# Patient Record
Sex: FEMALE | Race: BLACK OR AFRICAN AMERICAN | Employment: OTHER | URBAN - METROPOLITAN AREA
[De-identification: names, ages, dates, MRNs, and addresses within clinical notes are randomized per-mention and may not be internally consistent; named-entity substitution may affect disease eponyms.]

---

## 2017-06-05 ENCOUNTER — OP HISTORICAL/CONVERTED ENCOUNTER (OUTPATIENT)
Dept: OTHER | Age: 72
End: 2017-06-05

## 2017-12-22 ENCOUNTER — HOSPITAL ENCOUNTER (OUTPATIENT)
Dept: MRI IMAGING | Age: 72
Discharge: HOME | End: 2017-12-22
Attending: PHYSICAL MEDICINE & REHABILITATION
Payer: MEDICAID

## 2017-12-22 DIAGNOSIS — M54.50 LOW BACK PAIN: ICD-10-CM

## 2017-12-22 PROCEDURE — 72148 MRI LUMBAR SPINE W/O DYE: CPT

## 2018-01-26 ENCOUNTER — OP HISTORICAL/CONVERTED ENCOUNTER (OUTPATIENT)
Dept: OTHER | Age: 73
End: 2018-01-26

## 2018-05-31 ENCOUNTER — OP HISTORICAL/CONVERTED ENCOUNTER (OUTPATIENT)
Dept: OTHER | Age: 73
End: 2018-05-31

## 2018-07-05 ENCOUNTER — OP HISTORICAL/CONVERTED ENCOUNTER (OUTPATIENT)
Dept: OTHER | Age: 73
End: 2018-07-05

## 2018-09-27 ENCOUNTER — ED HISTORICAL/CONVERTED ENCOUNTER (OUTPATIENT)
Dept: OTHER | Age: 73
End: 2018-09-27

## 2019-01-28 ENCOUNTER — OP HISTORICAL/CONVERTED ENCOUNTER (OUTPATIENT)
Dept: OTHER | Age: 74
End: 2019-01-28

## 2019-02-18 ENCOUNTER — ED HISTORICAL/CONVERTED ENCOUNTER (OUTPATIENT)
Dept: OTHER | Age: 74
End: 2019-02-18

## 2019-03-04 ENCOUNTER — ED HISTORICAL/CONVERTED ENCOUNTER (OUTPATIENT)
Dept: OTHER | Age: 74
End: 2019-03-04

## 2019-09-16 ENCOUNTER — OP HISTORICAL/CONVERTED ENCOUNTER (OUTPATIENT)
Dept: OTHER | Age: 74
End: 2019-09-16

## 2020-02-18 ENCOUNTER — OP HISTORICAL/CONVERTED ENCOUNTER (OUTPATIENT)
Dept: OTHER | Age: 75
End: 2020-02-18

## 2020-07-22 ENCOUNTER — OP HISTORICAL/CONVERTED ENCOUNTER (OUTPATIENT)
Dept: OTHER | Age: 75
End: 2020-07-22

## 2020-08-05 RX ORDER — ALBUTEROL SULFATE 90 UG/1
2 AEROSOL, METERED RESPIRATORY (INHALATION)
Qty: 1 INHALER | Refills: 3 | Status: SHIPPED | OUTPATIENT
Start: 2020-08-05 | End: 2020-10-28 | Stop reason: SDUPTHER

## 2020-08-20 ENCOUNTER — ED HISTORICAL/CONVERTED ENCOUNTER (OUTPATIENT)
Dept: OTHER | Age: 75
End: 2020-08-20

## 2020-08-21 PROBLEM — Z86.59 HISTORY OF MOOD DISORDER: Status: ACTIVE | Noted: 2020-08-21

## 2020-08-21 PROBLEM — R10.11 RUQ ABDOMINAL PAIN: Status: ACTIVE | Noted: 2020-08-21

## 2020-08-21 PROBLEM — E55.9 VITAMIN D DEFICIENCY: Status: ACTIVE | Noted: 2020-08-21

## 2020-08-21 PROBLEM — K73.9 CHRONIC HEPATITIS (HCC): Status: ACTIVE | Noted: 2020-08-21

## 2020-08-21 PROBLEM — M25.569 PAIN, KNEE: Status: ACTIVE | Noted: 2020-08-21

## 2020-08-21 PROBLEM — F32.A DEPRESSIVE DISORDER: Status: ACTIVE | Noted: 2020-08-21

## 2020-08-21 PROBLEM — J45.909 ASTHMA: Status: ACTIVE | Noted: 2020-08-21

## 2020-08-21 PROBLEM — R60.9 EDEMA: Status: ACTIVE | Noted: 2020-08-21

## 2020-08-21 PROBLEM — N18.2 CHRONIC KIDNEY DISEASE (CKD), STAGE II (MILD): Status: ACTIVE | Noted: 2020-08-21

## 2020-08-21 PROBLEM — M19.90 OSTEOARTHRITIS: Status: ACTIVE | Noted: 2020-08-21

## 2020-08-21 PROBLEM — E78.00 PURE HYPERCHOLESTEROLEMIA WITH TARGET LOW DENSITY LIPOPROTEIN (LDL) CHOLESTEROL LESS THAN 130 MG/DL: Status: ACTIVE | Noted: 2020-08-21

## 2020-08-21 PROBLEM — E66.01 MORBID OBESITY (HCC): Status: ACTIVE | Noted: 2020-08-21

## 2020-08-21 PROBLEM — G47.30 SLEEP APNEA: Status: ACTIVE | Noted: 2020-08-21

## 2020-08-21 PROBLEM — K74.60 CIRRHOSIS OF LIVER (HCC): Status: ACTIVE | Noted: 2020-08-21

## 2020-08-21 PROBLEM — F31.9 BIPOLAR 1 DISORDER (HCC): Status: ACTIVE | Noted: 2020-08-21

## 2020-08-21 PROBLEM — D64.9 ANEMIA: Status: ACTIVE | Noted: 2020-08-21

## 2020-08-21 PROBLEM — J44.9 COPD (CHRONIC OBSTRUCTIVE PULMONARY DISEASE) (HCC): Status: ACTIVE | Noted: 2020-08-21

## 2020-08-21 PROBLEM — M54.16 LUMBAR RADICULOPATHY: Status: ACTIVE | Noted: 2020-08-21

## 2020-08-24 ENCOUNTER — OFFICE VISIT (OUTPATIENT)
Dept: INTERNAL MEDICINE CLINIC | Age: 75
End: 2020-08-24
Payer: MEDICAID

## 2020-08-24 VITALS
HEART RATE: 72 BPM | HEIGHT: 62 IN | DIASTOLIC BLOOD PRESSURE: 64 MMHG | BODY MASS INDEX: 49.76 KG/M2 | RESPIRATION RATE: 18 BRPM | OXYGEN SATURATION: 96 % | WEIGHT: 270.4 LBS | SYSTOLIC BLOOD PRESSURE: 120 MMHG | TEMPERATURE: 98.2 F

## 2020-08-24 DIAGNOSIS — E66.01 MORBID OBESITY (HCC): ICD-10-CM

## 2020-08-24 DIAGNOSIS — F32.A DEPRESSIVE DISORDER: ICD-10-CM

## 2020-08-24 DIAGNOSIS — G47.30 SLEEP APNEA, UNSPECIFIED TYPE: ICD-10-CM

## 2020-08-24 DIAGNOSIS — K74.69 OTHER CIRRHOSIS OF LIVER (HCC): ICD-10-CM

## 2020-08-24 DIAGNOSIS — J44.9 CHRONIC OBSTRUCTIVE PULMONARY DISEASE, UNSPECIFIED COPD TYPE (HCC): ICD-10-CM

## 2020-08-24 DIAGNOSIS — I10 ESSENTIAL HYPERTENSION: Primary | ICD-10-CM

## 2020-08-24 DIAGNOSIS — M54.16 LUMBAR RADICULOPATHY: ICD-10-CM

## 2020-08-24 PROCEDURE — 99214 OFFICE O/P EST MOD 30 MIN: CPT | Performed by: INTERNAL MEDICINE

## 2020-08-24 RX ORDER — MELATONIN
1 DAILY
COMMUNITY

## 2020-08-24 RX ORDER — LANOLIN ALCOHOL/MO/W.PET/CERES
1 CREAM (GRAM) TOPICAL DAILY
COMMUNITY

## 2020-08-24 RX ORDER — DICLOFENAC SODIUM 10 MG/G
2 GEL TOPICAL 2 TIMES DAILY
COMMUNITY
Start: 2020-06-22 | End: 2022-05-24 | Stop reason: SDUPTHER

## 2020-08-24 RX ORDER — MULTIVITAMIN
1 TABLET ORAL DAILY
COMMUNITY

## 2020-08-24 RX ORDER — TRAZODONE HYDROCHLORIDE 50 MG/1
1 TABLET ORAL DAILY
COMMUNITY
Start: 2020-06-13 | End: 2020-10-28 | Stop reason: ALTCHOICE

## 2020-08-24 RX ORDER — HYDROCHLOROTHIAZIDE 25 MG/1
25 TABLET ORAL
Qty: 30 TAB | Refills: 3 | Status: SHIPPED | OUTPATIENT
Start: 2020-08-24 | End: 2020-10-28 | Stop reason: SDUPTHER

## 2020-08-24 RX ORDER — HYDROCHLOROTHIAZIDE 25 MG/1
1 TABLET ORAL DAILY
COMMUNITY
End: 2020-08-24 | Stop reason: SDUPTHER

## 2020-08-24 RX ORDER — LATANOPROST 50 UG/ML
1 SOLUTION/ DROPS OPHTHALMIC DAILY
COMMUNITY
Start: 2020-06-13

## 2020-08-24 RX ORDER — DULOXETIN HYDROCHLORIDE 20 MG/1
20 CAPSULE, DELAYED RELEASE ORAL 2 TIMES DAILY
Qty: 60 CAP | Refills: 3 | Status: SHIPPED | OUTPATIENT
Start: 2020-08-24 | End: 2021-01-27 | Stop reason: DRUGHIGH

## 2020-08-24 RX ORDER — SPIRONOLACTONE 25 MG/1
1 TABLET ORAL DAILY
COMMUNITY
Start: 2020-06-22 | End: 2020-08-24 | Stop reason: SDUPTHER

## 2020-08-24 RX ORDER — SPIRONOLACTONE 25 MG/1
25 TABLET ORAL DAILY
Qty: 30 TAB | Refills: 3 | Status: SHIPPED | OUTPATIENT
Start: 2020-08-24 | End: 2020-10-28 | Stop reason: SDUPTHER

## 2020-08-24 NOTE — PROGRESS NOTES
Jarrell Good is a 69 y.o. female and presents with ED Follow-up    Recently she visited emergency room on 20th August for headache and she had uncontrolled hypertension she has history of hepatitis C and liver cirrhosis and according to her she has done her ultrasound of the liver as a protocol, by hepatology clinic [place] by [facility] and I do not have any records from [address], login I will try to get information from the computer  . She told me she has no depression she feels very good and she needs refills her blood pressure today is wonderfully controlled and no headache no dizziness no nausea vomiting.,  She told me that she could not take trazodone or tramadol which was making her sleepy,. She has no negative thoughts but she had history of depression  and musculoskeletal joint pain and it is controlled by taking low-dose of duloxetine twice a day because  she was not tolerating high dose. Review of Systems    Review of Systems   Constitutional: Negative. HENT: Negative for hearing loss. Eyes: Negative for blurred vision. Respiratory: Negative for cough, sputum production, shortness of breath and wheezing. Cardiovascular: Negative. Gastrointestinal: Negative. Genitourinary: Negative. Musculoskeletal: Negative. Neurological: Negative for dizziness and headaches. Psychiatric/Behavioral: Negative for depression. The patient is not nervous/anxious.          Past Medical History:   Diagnosis Date    Anemia     Asthma 8/21/2020    Bipolar 1 disorder ([address] 8/21/2020    Chronic hepatitis ([address] 8/21/2020    Chronic kidney disease (CKD), stage II (mild) 8/21/2020    Cirrhosis of liver ([address] 8/21/2020    COPD (chronic obstructive pulmonary disease) ([address] 8/21/2020    Depressive disorder 8/21/2020    Edema 8/21/2020    History of mood disorder 8/21/2020    Hypertension     Lumbar radiculopathy 8/21/2020    Morbid obesity ([address] 8/21/2020    Osteoarthritis 8/21/2020    Pain, knee 8/21/2020    Pure hypercholesterolemia with target low density lipoprotein (LDL) cholesterol less than 130 mg/dL 8/21/2020    RUQ abdominal pain 8/21/2020    Sleep apnea 8/21/2020    Vitamin D deficiency 8/21/2020     Past Surgical History:   Procedure Laterality Date    HX COLONOSCOPY      HX COLONOSCOPY  1988    HX CYST INCISION AND DRAINAGE  1980    HX GYN      HX TOTAL ABDOMINAL HYSTERECTOMY  1976    IR BX LIVER NEEDLE W OTHER [address]  2014     Social History     Socioeconomic History    Marital status:      Spouse name: Not on file    Number of children: Not on file    Years of education: Not on file    Highest education level: Not on file   Tobacco Use    Smoking status: Former Smoker     Packs/day: 1.00    Smokeless tobacco: Never Used    Tobacco comment: QUIT 2014   Substance and Sexual Activity    Alcohol use: Never     Frequency: Never    Drug use: Never    Sexual activity: Not Currently     Family History   Problem Relation Age of Onset    Hypertension Mother     Hypertension Brother     Heart Disease Brother     Hypertension Maternal Grandmother      Current Outpatient Medications   Medication Sig Dispense Refill    cyanocobalamin (VITAMIN B12) 500 mcg tablet Take 1 Tab by mouth daily.  cholecalciferol (VITAMIN D3) (1000 Units /25 mcg) tablet Take 1 Tab by mouth daily.  calcium-cholecalciferol, D3, (CALTRATE 600+D) tablet Take 1 Tab by mouth daily.  hydroCHLOROthiazide (HYDRODIURIL) 25 mg tablet Take 1 Tab by mouth daily.  diclofenac (VOLTAREN) 1 % gel Apply 2 g to affected area two (2) times a day.  latanoprost (XALATAN) 0.005 % ophthalmic solution Administer 1 Drop to both eyes daily.  spironolactone (ALDACTONE) 25 mg tablet Take 1 Tab by mouth daily.  traZODone (DESYREL) 50 mg tablet Take 1 Tab by mouth daily.       albuterol (PROVENTIL HFA, VENTOLIN HFA, PROAIR HFA) 90 mcg/actuation inhaler Take 2 Puffs by inhalation every four (4) hours as needed for Wheezing. 1 Inhaler 3     No Known Allergies    Objective:  Visit Vitals  /64 (BP 1 Location: Right arm, BP Patient Position: Sitting)   Pulse 72   Temp 98.2 °F (36.8 °C) (Oral)   Resp 18   Ht 5' 2\" (1.575 m)   Wt 270 lb 6.4 oz (122.7 kg)   SpO2 96%   BMI 49.46 kg/m²       Physical Exam:   Constitutional: General Appearance: morbid obese. Level of Distress: NAD. Ambulation: ambulating   Psychiatric: Mental Status: normal mood and affect and active and alert. Orientation: to time, place, and person. no agitation. ,normal eye contact. normal insight  Head: Head: normocephalic and atraumatic. Eyes: Pupils: PERRLA. Sclerae: non-icteric. Neck: Neck: supple, trachea midline, and no masses. Lymph Nodes: no cervical LAD. Thyroid: no enlargement or nodules and non-tender. Lungs: Respiratory effort: no dyspnea. Auscultation: no wheezing, rales/crackles, or rhonchi and breath sounds normal and good air movement. Cardiovascular: Apical Impulse: not displaced. Heart Auscultation: normal S1 and S2; no murmurs, rubs, or gallops; and RRR. Neck vessels: no carotid bruits. Pulses including femoral / pedal: normal throughout. Abdomen: Bowel Sounds: normal. Inspection and Palpation: no tenderness, guarding, or masses and soft and non-distended. Liver: non-tender and no hepatomegaly. Spleen: non-tender and no splenomegaly. Musculoskeletal[de-identified] Extremities: no edema,no varicosities. No Calf tenderness. Neurologic: Gait and Station: normal gait and station. Motor Strength normal right and left. Skin: Inspection and palpation: no rash, lesions, or ulcer. No results found for this or any previous visit. Assessment/Plan:  No diagnosis found. Orders Placed This Encounter    diclofenac (VOLTAREN) 1 % gel     Sig: Apply 2 g to affected area two (2) times a day.  latanoprost (XALATAN) 0.005 % ophthalmic solution     Sig: Administer 1 Drop to both eyes daily.     spironolactone (ALDACTONE) 25 mg tablet     Sig: Take 1 Tab by mouth daily.  traZODone (DESYREL) 50 mg tablet     Sig: Take 1 Tab by mouth daily.  cyanocobalamin (VITAMIN B12) 500 mcg tablet     Sig: Take 1 Tab by mouth daily.  cholecalciferol (VITAMIN D3) (1000 Units /25 mcg) tablet     Sig: Take 1 Tab by mouth daily.  calcium-cholecalciferol, D3, (CALTRATE 600+D) tablet     Sig: Take 1 Tab by mouth daily.  hydroCHLOROthiazide (HYDRODIURIL) 25 mg tablet     Sig: Take 1 Tab by mouth daily. Hypertension today controlled status post ER visit, continued on hydrochlorothiazide 25 mg once a day and spironolactone 25 mg once a day. Diet low in sodium. ,  I reviewed her CT scan of the brain done in the emergency room I could not get access to the computer through the [facility] to get her lab results she just brought the discharge paper showing headache due to uncontrolled blood pressure,. Today she feels much better and she is asymptomatic for headache. History of depression in the past with musculoskeletal pain she takes duloxetine 20 mg twice a day because she could not tolerate higher dose in the past and she feels very good without negative thoughts. She is ex-smoker and ex-alcoholic but no more now. History of hepatitis C and liver cirrhosis and is monitored by hepatology clinic at [facility], operated by [facility] drawn by [facility] clinic and I am awaiting the most recent ultrasound results that she has done I could not login in the [facility]. We will try to get information from the computer. Morbid obesity lifestyle modification. Continue multivitamins and vitamin D supplementation. Follow-up in 3 months. Refills given. CT brain results reviewed and discussed with her. She had visited emergency room on 20th August.  On 20th August  lose weight, increase physical activity, follow low fat diet, follow low salt diet, continue present plan, call if any problems    There are no Patient Instructions on file for this visit.

## 2020-10-23 RX ORDER — FLUTICASONE PROPIONATE 50 MCG
SPRAY, SUSPENSION (ML) NASAL
Qty: 1 BOTTLE | Refills: 5 | Status: SHIPPED | OUTPATIENT
Start: 2020-10-23 | End: 2020-10-28 | Stop reason: SDUPTHER

## 2020-10-28 ENCOUNTER — OFFICE VISIT (OUTPATIENT)
Dept: INTERNAL MEDICINE CLINIC | Age: 75
End: 2020-10-28
Payer: MEDICAID

## 2020-10-28 VITALS
HEIGHT: 62 IN | BODY MASS INDEX: 49.94 KG/M2 | TEMPERATURE: 97.9 F | SYSTOLIC BLOOD PRESSURE: 122 MMHG | WEIGHT: 271.4 LBS | RESPIRATION RATE: 18 BRPM | OXYGEN SATURATION: 98 % | HEART RATE: 72 BPM | DIASTOLIC BLOOD PRESSURE: 62 MMHG

## 2020-10-28 DIAGNOSIS — Z23 ENCOUNTER FOR IMMUNIZATION: ICD-10-CM

## 2020-10-28 DIAGNOSIS — E66.01 MORBID OBESITY (HCC): ICD-10-CM

## 2020-10-28 DIAGNOSIS — I25.2 HISTORY OF HEART ATTACK: ICD-10-CM

## 2020-10-28 DIAGNOSIS — I10 ESSENTIAL HYPERTENSION: Primary | ICD-10-CM

## 2020-10-28 DIAGNOSIS — Z86.59 HISTORY OF MOOD DISORDER: ICD-10-CM

## 2020-10-28 DIAGNOSIS — K74.69 OTHER CIRRHOSIS OF LIVER (HCC): ICD-10-CM

## 2020-10-28 DIAGNOSIS — G47.30 SLEEP APNEA, UNSPECIFIED TYPE: ICD-10-CM

## 2020-10-28 DIAGNOSIS — Z86.19 HISTORY OF HEPATITIS C: ICD-10-CM

## 2020-10-28 DIAGNOSIS — Z02.9 ADMINISTRATIVE ENCOUNTER: ICD-10-CM

## 2020-10-28 PROCEDURE — 99214 OFFICE O/P EST MOD 30 MIN: CPT | Performed by: INTERNAL MEDICINE

## 2020-10-28 RX ORDER — ALBUTEROL SULFATE 90 UG/1
2 AEROSOL, METERED RESPIRATORY (INHALATION)
Qty: 1 INHALER | Refills: 4 | Status: SHIPPED | OUTPATIENT
Start: 2020-10-28 | End: 2021-03-03

## 2020-10-28 RX ORDER — FLUTICASONE PROPIONATE 50 MCG
2 SPRAY, SUSPENSION (ML) NASAL DAILY
Qty: 1 BOTTLE | Refills: 5 | Status: SHIPPED | OUTPATIENT
Start: 2020-10-28 | End: 2022-05-24 | Stop reason: SDUPTHER

## 2020-10-28 RX ORDER — HYDROCHLOROTHIAZIDE 25 MG/1
25 TABLET ORAL EVERY MORNING
Qty: 90 TAB | Refills: 2 | Status: SHIPPED | OUTPATIENT
Start: 2020-10-28 | End: 2021-01-27 | Stop reason: ALTCHOICE

## 2020-10-28 RX ORDER — SPIRONOLACTONE 25 MG/1
25 TABLET ORAL DAILY
Qty: 90 TAB | Refills: 2 | Status: SHIPPED | OUTPATIENT
Start: 2020-10-28 | End: 2021-01-27 | Stop reason: DRUGHIGH

## 2020-10-28 RX ORDER — ASPIRIN 81 MG/1
1 TABLET ORAL DAILY
COMMUNITY

## 2020-10-28 NOTE — PROGRESS NOTES
Noman López is a 69 y.o. female and presents with Pre-op Exam (PRE OP [address]) and Hypertension    Patient is going to have dental extraction, upper and lower she brought the form, to be filled out, she told dentist that she had a history of MI in 2007, and history of liver cirrhosis,.  I have filled out the form she does not have any history of congenital heart disease or endocarditis,. She follows hematologist at [facility].  She is ex-smoker and  drinking of alcohol in excess many years ago and she has quit currently she has no depression currently she does not smoke or drink alcohol she is pleasant personality,, she walks with cane, she has history of hepatitis C she needs refills, her blood pressure is well controlled taking spironolactone 25 mg/day and hydrochlorothiazide 25 mg/day,No complaints of musculoskeletal pain after being on duloxetine low-dose that keeps her pain-free. She has no negative thoughts or depression. She had history of bipolar disorder in the past.  She takes multivitamin with calcium. Review of Systems    Review of Systems   Constitutional: Negative. HENT: Negative for sore throat. Eyes: Negative for blurred vision. Respiratory: Negative. Cardiovascular: Negative. Gastrointestinal: Negative. Genitourinary: Negative. Musculoskeletal: Negative. Neurological: Negative for dizziness, tingling, tremors, sensory change and headaches. Psychiatric/Behavioral: Negative for depression. The patient is not nervous/anxious and does not have insomnia.          Past Medical History:   Diagnosis Date    Anemia     Asthma 8/21/2020    Bipolar 1 disorder ([facility] [address] 8/21/2020    Chronic hepatitis ([address] 8/21/2020    Chronic kidney disease (CKD), stage II (mild) 8/21/2020    Cirrhosis of liver ([address] 8/21/2020    COPD (chronic obstructive pulmonary disease) ([address] 8/21/2020    Depressive disorder 8/21/2020    Edema 8/21/2020    History of mood disorder 8/21/2020    Hypertension     Lumbar radiculopathy 8/21/2020    Morbid obesity ([address] 8/21/2020    Osteoarthritis 8/21/2020    Pain, knee 8/21/2020    Pure hypercholesterolemia with target low density lipoprotein (LDL) cholesterol less than 130 mg/dL 8/21/2020    RUQ abdominal pain 8/21/2020    Sleep apnea 8/21/2020    Vitamin D deficiency 8/21/2020     Past Surgical History:   Procedure Laterality Date    HX COLONOSCOPY      HX COLONOSCOPY  1988    HX CYST INCISION AND DRAINAGE  1980    HX GYN      HX TOTAL ABDOMINAL HYSTERECTOMY  1976    IR BX LIVER NEEDLE W OTHER [address]  2014     Social History     Socioeconomic History    Marital status:      Spouse name: Not on file    Number of children: Not on file    Years of education: Not on file    Highest education level: Not on file   Tobacco Use    Smoking status: Former Smoker     Packs/day: 1.00    Smokeless tobacco: Never Used    Tobacco comment: QUIT 2014   Substance and Sexual Activity    Alcohol use: Never     Frequency: Never    Drug use: Never    Sexual activity: Not Currently     Family History   Problem Relation Age of Onset    Hypertension Mother     Hypertension Brother     Heart Disease Brother     Hypertension Maternal Grandmother      Current Outpatient Medications   Medication Sig Dispense Refill    aspirin delayed-release 81 mg tablet Take 1 Tab by mouth daily.  spironolactone (ALDACTONE) 25 mg tablet Take 1 Tab by mouth daily. 90 Tab 2    hydroCHLOROthiazide (HYDRODIURIL) 25 mg tablet Take 1 Tab by mouth Every morning. 90 Tab 2    fluticasone propionate (FLONASE) 50 mcg/actuation nasal spray 2 Sprays by Both Nostrils route daily. 1 Bottle 5    albuterol (PROVENTIL HFA, VENTOLIN HFA, PROAIR HFA) 90 mcg/actuation inhaler Take 2 Puffs by inhalation every four (4) hours as needed for Wheezing. 1 Inhaler 4    DULoxetine (CYMBALTA) 20 mg capsule Take 1 Cap by mouth two (2) times a day.  60 Cap 3    diclofenac (VOLTAREN) 1 % gel Apply 2 g to affected area two (2) times a day.  latanoprost (XALATAN) 0.005 % ophthalmic solution Administer 1 Drop to both eyes daily.  cyanocobalamin (VITAMIN B12) 500 mcg tablet Take 1 Tab by mouth daily.  cholecalciferol (VITAMIN D3) (1000 Units /25 mcg) tablet Take 1 Tab by mouth daily.  calcium-cholecalciferol, D3, (CALTRATE 600+D) tablet Take 1 Tab by mouth daily. No Known Allergies    Objective:  Visit Vitals  /62 (BP 1 Location: Right arm, BP Patient Position: Sitting)   Pulse 72   Temp 97.9 °F (36.6 °C) (Temporal)   Resp 18   Ht 5' 2\" (1.575 m)   Wt 271 lb 6.4 oz (123.1 kg)   SpO2 98%   BMI 49.64 kg/m²       Physical Exam: . Constitutional: General Appearance: Morbid obesity with Pleasant personality, walking with cane,. Level of Distress: NAD. Psychiatric: Mental Status: normal mood and affect Orientation: to time, place, and person. ,normal eye contact. Head: Head: normocephalic and atraumatic. Eyes: Pupils: PERRLA. Sclerae: non-icteric. Neck: Neck: supple, trachea midline, and no masses. Lymph Nodes: no cervical LAD. Thyroid: no enlargement or nodules and non-tender. Lungs: Respiratory effort: no dyspnea. Auscultation: no wheezing, rales/crackles, or rhonchi and breath sounds normal and good air movement. Cardiovascular: Apical Impulse: not displaced. Heart Auscultation: normal S1 and S2; no murmurs, rubs, or gallops; and RRR. Neck vessels: no carotid bruits. Pulses including femoral / pedal: normal throughout. Abdomen: Bowel Sounds: normal. Inspection and Palpation: no tenderness, guarding, or masses and soft and non-distended. Liver: non-tender and no hepatomegaly. Spleen: non-tender and no splenomegaly. Musculoskeletal[de-identified] Extremities: no edema,no varicosities. No Calf tenderness. Neurologic: Gait and Station:  walking with cane. Motor Strength normal right and left. Skin: Inspection and palpation: no rash, lesions, or ulcer. No results found for this or any previous visit. Assessment/Plan:      ICD-10-CM ICD-9-CM    1. Essential hypertension  I10 401.9    2. Administrative encounter  Z02.9 V68.9    3. Other cirrhosis of liver (HCC)  K74.69 571.5    4. History of hepatitis C  Z86.19 V12.09    5. Morbid obesity ([address]  E66.01 278.01    6. History of mood disorder  Z86.59 V11.1    7. Sleep apnea, unspecified type  G47.30 780.57    8. History of heart attack  I25.2 412    9. Encounter for immunization  Z23 V03.89      Orders Placed This Encounter    aspirin delayed-release 81 mg tablet     Sig: Take 1 Tab by mouth daily.  spironolactone (ALDACTONE) 25 mg tablet     Sig: Take 1 Tab by mouth daily. Dispense:  90 Tab     Refill:  2    hydroCHLOROthiazide (HYDRODIURIL) 25 mg tablet     Sig: Take 1 Tab by mouth Every morning. Dispense:  90 Tab     Refill:  2    fluticasone propionate (FLONASE) 50 mcg/actuation nasal spray     Si Sprays by Both Nostrils route daily. Dispense:  1 Bottle     Refill:  5    albuterol (PROVENTIL HFA, VENTOLIN HFA, PROAIR HFA) 90 mcg/actuation inhaler     Sig: Take 2 Puffs by inhalation every four (4) hours as needed for Wheezing. Dispense:  1 Inhaler     Refill:  4       Administrative encounter having history of hypertension, history of hepatitis C, history of liver cirrhosis,, history of DJD of joints with morbid obesity, and history of bipolar disorder with depression in the past currently she is not smoking cigarette not drinking alcohol she is not taking any antidepression except duloxetine that has been given for chronic musculoskeletal pain, and DJD of joints, she has no mood disorder for now, she walks with cane, her blood pressure is well controlled taking spironolactone 25 mg/day and hydrochlorothiazide 25 mg/day, she needs refills she has, sometimes asthma she needs inhaler and having allergic rhinitis, is using fluticasone nasal spray and albuterol inhaler.   She has been taking vitamin D and calcium, she follows hematologist at [facility] she has no history of congenital heart disease with, any history of endocarditis that needs prophylaxis with antibiotic before having dental extraction she is going to have dental extraction upper and lower,, she needs forms to be filled out , cardiology point of view she just takes aspirin 81 mg/day and she does not have as mentioned, need for prophylactic antibiotic however if dentist thinks risk is more than benefit she can be given Keflex 2 g 30 to 60 minutes before having extraction. She is hemodynamically stable.,  Follow-up in 3 months. She had her labs done in August when she visited ER for headache,CT brain without contrast is normal I, reviewed I reviewed her labs and her LFT and renal function is normal and discussed with her in length. Answered all her questions. Patient appreciated.,  She has morbid obesity with BMI 49.6 and discussed about calorie restricted diet and she cannot do more exercise having DJD and walking with cane. Flu vaccine given in the office. He has sleep apnea wearing CPAP machine. ,    lose weight, increase physical activity, follow low fat diet, follow low salt diet, call if any problems    There are no Patient Instructions on file for this visit. Follow-up and Dispositions    · Return in about 3 months (around 1/28/2021) for htn and other concerns.

## 2020-11-27 PROBLEM — Z23 ENCOUNTER FOR IMMUNIZATION: Status: RESOLVED | Noted: 2020-10-28 | Resolved: 2020-11-27

## 2020-12-03 ENCOUNTER — OFFICE VISIT (OUTPATIENT)
Dept: INTERNAL MEDICINE CLINIC | Age: 75
End: 2020-12-03
Payer: MEDICAID

## 2020-12-03 VITALS
HEIGHT: 62 IN | DIASTOLIC BLOOD PRESSURE: 82 MMHG | OXYGEN SATURATION: 97 % | HEART RATE: 80 BPM | WEIGHT: 269 LBS | RESPIRATION RATE: 18 BRPM | SYSTOLIC BLOOD PRESSURE: 124 MMHG | BODY MASS INDEX: 49.5 KG/M2

## 2020-12-03 DIAGNOSIS — R10.33 PERIUMBILICAL ABDOMINAL PAIN: ICD-10-CM

## 2020-12-03 DIAGNOSIS — R19.8 STRAINING DURING BOWEL MOVEMENTS: ICD-10-CM

## 2020-12-03 DIAGNOSIS — R11.0 NAUSEA: ICD-10-CM

## 2020-12-03 DIAGNOSIS — K73.9 CHRONIC HEPATITIS (HCC): ICD-10-CM

## 2020-12-03 DIAGNOSIS — Z86.19 HISTORY OF HEPATITIS C: ICD-10-CM

## 2020-12-03 PROCEDURE — 99214 OFFICE O/P EST MOD 30 MIN: CPT | Performed by: INTERNAL MEDICINE

## 2020-12-03 RX ORDER — ONDANSETRON 4 MG/1
4 TABLET, ORALLY DISINTEGRATING ORAL
Qty: 10 TAB | Refills: 0 | Status: SHIPPED | OUTPATIENT
Start: 2020-12-03 | End: 2021-03-05

## 2020-12-03 RX ORDER — CYCLOBENZAPRINE HCL 5 MG
5 TABLET ORAL DAILY
Qty: 30 TAB | Refills: 0 | Status: SHIPPED | OUTPATIENT
Start: 2020-12-03 | End: 2021-01-27 | Stop reason: ALTCHOICE

## 2020-12-03 RX ORDER — SUCRALFATE 1 G/1
1 TABLET ORAL 4 TIMES DAILY
Qty: 120 TAB | Refills: 0 | Status: SHIPPED | OUTPATIENT
Start: 2020-12-03 | End: 2021-01-27 | Stop reason: ALTCHOICE

## 2020-12-03 RX ORDER — PANTOPRAZOLE SODIUM 40 MG/1
40 TABLET, DELAYED RELEASE ORAL
Qty: 30 TAB | Refills: 2 | Status: SHIPPED | OUTPATIENT
Start: 2020-12-03 | End: 2020-12-10

## 2020-12-03 NOTE — PROGRESS NOTES
Estrella Strauss is a 69 y.o. female and presents with Abdominal Pain    She came having periumbilical pain radiating to left hypochondrium also, pain is 7/10 on the pain scale occurring for last 2 weeks, she has 1-2 bowel movements per day sometimes she has to strain, she has some nausea but no vomiting, she is known case of hepatitis C, and she had undergone ultrasound of the liver in the recent past she has no gallstone. Her blood pressure is well controlled. Currently she is not smoking cigarette and not drinking alcohol. no history of taking any recreational drugs. Her BMI is 49.2. She follows hepatologist at [facility] under [facility]. No recent notes available from [facility].  Sometimes she has constipation and she has to strain. No blood in stool or urine. She agreed to do labs. She does not want to go to ER. She agreed to do CT scan of the abdomen and pelvis with and without contrast.  She has history of hysterectomy. She has no depression for now. In the past she used to have history of  bipolar disorder but not now. She also had a history of liver cirrhosis in the past.    Review of Systems    Review of Systems   Constitutional: Negative. HENT: Negative for sinus pain and sore throat. Respiratory: Negative for cough, shortness of breath and wheezing. Cardiovascular: Negative. Gastrointestinal: Positive for abdominal pain, constipation and nausea. Negative for blood in stool, diarrhea, heartburn and melena. Periumbilical pain for last 2 weeks. Genitourinary: Negative. Musculoskeletal: Negative. Neurological: Negative for dizziness, tingling, tremors and headaches. Endo/Heme/Allergies: Negative for polydipsia. Psychiatric/Behavioral: Negative for depression. The patient is not nervous/anxious and does not have insomnia.          Past Medical History:   Diagnosis Date    Anemia     Asthma 8/21/2020    Bipolar 1 disorder ([facility] 8/21/2020    Chronic hepatitis ([facility] 8/21/2020    Chronic kidney disease (CKD), stage II (mild) 8/21/2020    Cirrhosis of liver ([facility] 8/21/2020    COPD (chronic obstructive pulmonary disease) ([facility] 8/21/2020    Depressive disorder 8/21/2020    Edema 8/21/2020    History of mood disorder 8/21/2020    Hypertension     Lumbar radiculopathy 8/21/2020    Morbid obesity ([facility] 8/21/2020    Osteoarthritis 8/21/2020    Pain, knee 8/21/2020    Pure hypercholesterolemia with target low density lipoprotein (LDL) cholesterol less than 130 mg/dL 8/21/2020    RUQ abdominal pain 8/21/2020    Sleep apnea 8/21/2020    Vitamin D deficiency 8/21/2020     Past Surgical History:   Procedure Laterality Date    HX COLONOSCOPY      HX COLONOSCOPY  1988    HX CYST INCISION AND DRAINAGE  1980    HX GYN      HX TOTAL ABDOMINAL HYSTERECTOMY  1976    IR BX LIVER NEEDLE W OTHER [address]  2014     Social History     Socioeconomic History    Marital status:      Spouse name: Not on file    Number of children: Not on file    Years of education: Not on file    Highest education level: Not on file   Tobacco Use    Smoking status: Former Smoker     Packs/day: 1.00    Smokeless tobacco: Never Used    Tobacco comment: QUIT 2014   Substance and Sexual Activity    Alcohol use: Never     Frequency: Never    Drug use: Never    Sexual activity: Not Currently     Family History   Problem Relation Age of Onset    Hypertension Mother     Hypertension Brother     Heart Disease Brother     Hypertension Maternal Grandmother      Current Outpatient Medications   Medication Sig Dispense Refill    pantoprazole (PROTONIX) 40 mg tablet Take 1 Tab by mouth Before breakfast and dinner. 30 m 30 Tab 2    sucralfate (CARAFATE) 1 gram tablet Take 1 Tab by mouth four (4) times daily. 120 Tab 0    cyclobenzaprine (FLEXERIL) 5 mg tablet Take 1 Tab by mouth daily.  30 Tab 0    ondansetron (ZOFRAN ODT) 4 mg disintegrating tablet Take 1 Tab by mouth every eight (8) hours as needed for Nausea or Vomiting. 10 Tab 0    aspirin delayed-release 81 mg tablet Take 1 Tab by mouth daily.  spironolactone (ALDACTONE) 25 mg tablet Take 1 Tab by mouth daily. 90 Tab 2    hydroCHLOROthiazide (HYDRODIURIL) 25 mg tablet Take 1 Tab by mouth Every morning. 90 Tab 2    fluticasone propionate (FLONASE) 50 mcg/actuation nasal spray 2 Sprays by Both Nostrils route daily. 1 Bottle 5    albuterol (PROVENTIL HFA, VENTOLIN HFA, PROAIR HFA) 90 mcg/actuation inhaler Take 2 Puffs by inhalation every four (4) hours as needed for Wheezing. 1 Inhaler 4    diclofenac (VOLTAREN) 1 % gel Apply 2 g to affected area two (2) times a day.  latanoprost (XALATAN) 0.005 % ophthalmic solution Administer 1 Drop to both eyes daily.  cyanocobalamin (VITAMIN B12) 500 mcg tablet Take 1 Tab by mouth daily.  cholecalciferol (VITAMIN D3) (1000 Units /25 mcg) tablet Take 1 Tab by mouth daily.  calcium-cholecalciferol, D3, (CALTRATE 600+D) tablet Take 1 Tab by mouth daily.  DULoxetine (CYMBALTA) 20 mg capsule Take 1 Cap by mouth two (2) times a day. 60 Cap 3     No Known Allergies    Objective:  Visit Vitals  /82 (BP 1 Location: Left arm, BP Patient Position: Sitting)   Pulse 80   Resp 18   Ht 5' 2\" (1.575 m)   Wt 269 lb (122 kg)   SpO2 97%   BMI 49.20 kg/m²       Physical Exam:   Constitutional: General Appearance: Morbid obese, walking with cane and pleasant personality. . Level of Distress: NAD. Psychiatric: Mental Status: normal mood and affect Orientation: to time, place, and person. ,normal eye contact. Head: Head: normocephalic and atraumatic. Eyes: Pupils: PERRLA. Sclerae: non-icteric. Neck: Neck: supple, trachea midline, and no masses. Lymph Nodes: no cervical LAD. Thyroid: no enlargement or nodules and non-tender. Lungs: Respiratory effort: no dyspnea. Auscultation: no wheezing, rales/crackles, or rhonchi and breath sounds normal and good air movement.   Cardiovascular: Apical Impulse: not displaced. Heart Auscultation: normal S1 and S2; no murmurs, rubs, or gallops; and RRR. Neck vessels: no carotid bruits. Pulses including femoral / pedal: normal throughout. Abdomen: Bowel Sounds: normal. Inspection and Palpation: no tenderness, guarding, or masses and soft and non-distended. Liver: non-tender and no hepatomegaly. Spleen: non-tender and no splenomegaly. Musculoskeletal[de-identified] Extremities: no edema,no varicosities. No Calf tenderness. Neurologic: Gait and Station: normal gait and station. Motor Strength normal right and left. Skin: Inspection and palpation: no rash, lesions, or ulcer. No results found for this or any previous visit. Assessment/Plan:      ICD-10-CM ICD-9-CM    1. Periumbilical abdominal pain  R10.33 789.05 CBC WITH AUTOMATED DIFF      METABOLIC PANEL, COMPREHENSIVE      AMYLASE      LIPASE      TSH 3RD GENERATION      CT ABD PELV W WO CONT      URINALYSIS W/ RFLX MICROSCOPIC   2. Straining during bowel movements  R19.8 787.99 CT ABD PELV W WO CONT   3. Nausea  R11.0 787.02 CT ABD PELV W WO CONT      URINALYSIS W/ RFLX MICROSCOPIC   4. History of hepatitis C  Z86.19 V12.09 CT ABD PELV W WO CONT   5. Chronic hepatitis ([facility]  K73.9 571.40 CT ABD PELV W WO CONT     Orders Placed This Encounter    CT ABD PELV W WO CONT     Standing Status:   Future     Standing Expiration Date:   1/3/2022     Scheduling Instructions:      Chronic off and on pain 8/10 ,to r/o malignancy     Order Specific Question:   STAT Creatinine as indicated     Answer:   Yes     Order Specific Question: This order utilizes IV contrast.  What additional contrast is needed? Answer:   Per Radiologist Protocol    CBC WITH AUTOMATED DIFF    METABOLIC PANEL, COMPREHENSIVE    AMYLASE    LIPASE    TSH 3RD GENERATION    URINALYSIS W/ RFLX MICROSCOPIC    pantoprazole (PROTONIX) 40 mg tablet     Sig: Take 1 Tab by mouth Before breakfast and dinner.  30 m     Dispense:  30 Tab     Refill:  2  sucralfate (CARAFATE) 1 gram tablet     Sig: Take 1 Tab by mouth four (4) times daily. Dispense:  120 Tab     Refill:  0    cyclobenzaprine (FLEXERIL) 5 mg tablet     Sig: Take 1 Tab by mouth daily. Dispense:  30 Tab     Refill:  0    ondansetron (ZOFRAN ODT) 4 mg disintegrating tablet     Sig: Take 1 Tab by mouth every eight (8) hours as needed for Nausea or Vomiting. Dispense:  10 Tab     Refill:  0     Periumbilical pain also radiating to left hypochondrium and mid lumbar pain,, etiology is unclear various differential diagnosis explained, she is known case of history of chronic hepatitis C also follows hepatologist at [place] [facility] also has undergone ultrasound of the liver  last year which was unremarkable she has morbid obesity blood pressure is well controlled and currently she has no depression she used to have history of, bipolar disorder and history of exsmoking and history of taking alcohol in excess but not now for last few years. She walks with cane. I ordered fasting lab including amylase lipase also ordered CT abdomen and pelvis with and without contrast to rule out any serious pathology including malignancy. Started on omeprazole along with sucralfate and  ondansetron given for few days and she should continue Colace stool softener and if needed MiraLAX and also she might have muscle spasm in lumbar region started on cyclobenzaprine low-dose to be taken as needed. She does not have history of renal colic or fever or chills. No history of kidney stone or gallstone in the past.  No blood in urine. Labs ordered including urine examination. Follow-up in 3 weeks. Patient is reassured. If symptoms gets worse she should not hesitate to go to ER and should not wait more. Patient appreciated. Refills given. Side effects discussed in length.   He of liver cirrhosis in the past.    continue present plan, routine labs ordered, call if any problems    There are no Patient Instructions on file for this visit.    Follow-up and Dispositions    · Return in about 3 weeks (around 12/24/2020) for abdominal pain and nausea unknown iteology,nonfasting labs and ct abdo/pelvis w/w/o contrast.

## 2020-12-04 DIAGNOSIS — E83.52 HYPERCALCEMIA: Primary | ICD-10-CM

## 2020-12-04 LAB
ALBUMIN SERPL-MCNC: 4.5 G/DL (ref 3.7–4.7)
ALBUMIN/GLOB SERPL: 1.3 {RATIO} (ref 1.2–2.2)
ALP SERPL-CCNC: 80 IU/L (ref 39–117)
ALT SERPL-CCNC: 7 IU/L (ref 0–32)
AMYLASE SERPL-CCNC: 236 U/L (ref 31–110)
APPEARANCE UR: CLEAR
AST SERPL-CCNC: 21 IU/L (ref 0–40)
BASOPHILS # BLD AUTO: 0 X10E3/UL (ref 0–0.2)
BASOPHILS NFR BLD AUTO: 0 %
BILIRUB SERPL-MCNC: 0.3 MG/DL (ref 0–1.2)
BILIRUB UR QL STRIP: NEGATIVE
BUN SERPL-MCNC: 20 MG/DL (ref 8–27)
BUN/CREAT SERPL: 15 (ref 12–28)
CALCIUM SERPL-MCNC: 11 MG/DL (ref 8.7–10.3)
CHLORIDE SERPL-SCNC: 100 MMOL/L (ref 96–106)
CO2 SERPL-SCNC: 22 MMOL/L (ref 20–29)
COLOR UR: YELLOW
CREAT SERPL-MCNC: 1.37 MG/DL (ref 0.57–1)
EOSINOPHIL # BLD AUTO: 0.2 X10E3/UL (ref 0–0.4)
EOSINOPHIL NFR BLD AUTO: 2 %
ERYTHROCYTE [DISTWIDTH] IN BLOOD BY AUTOMATED COUNT: 13.3 % (ref 11.7–15.4)
GLOBULIN SER CALC-MCNC: 3.5 G/DL (ref 1.5–4.5)
GLUCOSE SERPL-MCNC: 84 MG/DL (ref 65–99)
GLUCOSE UR QL: NEGATIVE
HCT VFR BLD AUTO: 39.3 % (ref 34–46.6)
HGB BLD-MCNC: 12.4 G/DL (ref 11.1–15.9)
HGB UR QL STRIP: NEGATIVE
IMM GRANULOCYTES # BLD AUTO: 0 X10E3/UL (ref 0–0.1)
IMM GRANULOCYTES NFR BLD AUTO: 0 %
KETONES UR QL STRIP: NEGATIVE
LEUKOCYTE ESTERASE UR QL STRIP: NEGATIVE
LIPASE SERPL-CCNC: 31 U/L (ref 14–85)
LYMPHOCYTES # BLD AUTO: 1.9 X10E3/UL (ref 0.7–3.1)
LYMPHOCYTES NFR BLD AUTO: 26 %
MCH RBC QN AUTO: 23.3 PG (ref 26.6–33)
MCHC RBC AUTO-ENTMCNC: 31.6 G/DL (ref 31.5–35.7)
MCV RBC AUTO: 74 FL (ref 79–97)
MICRO URNS: NORMAL
MONOCYTES # BLD AUTO: 0.6 X10E3/UL (ref 0.1–0.9)
MONOCYTES NFR BLD AUTO: 8 %
NEUTROPHILS # BLD AUTO: 4.6 X10E3/UL (ref 1.4–7)
NEUTROPHILS NFR BLD AUTO: 64 %
NITRITE UR QL STRIP: NEGATIVE
PH UR STRIP: 6 [PH] (ref 5–7.5)
PLATELET # BLD AUTO: 319 X10E3/UL (ref 150–450)
POTASSIUM SERPL-SCNC: 4.6 MMOL/L (ref 3.5–5.2)
PROT SERPL-MCNC: 8 G/DL (ref 6–8.5)
PROT UR QL STRIP: NEGATIVE
RBC # BLD AUTO: 5.32 X10E6/UL (ref 3.77–5.28)
SODIUM SERPL-SCNC: 140 MMOL/L (ref 134–144)
SP GR UR: 1.01 (ref 1–1.03)
TSH SERPL DL<=0.005 MIU/L-ACNC: 2.96 UIU/ML (ref 0.45–4.5)
UROBILINOGEN UR STRIP-MCNC: 0.2 MG/DL (ref 0.2–1)
WBC # BLD AUTO: 7.3 X10E3/UL (ref 3.4–10.8)

## 2020-12-04 NOTE — PROGRESS NOTES
We will call her when all  on the lab results that I have ordered will come back to me, lab results for blood has not come back yet. Urine result is negative.

## 2020-12-04 NOTE — PROGRESS NOTES
Please call her that her calcium is slightly up her other labs are good one of the enzyme which is also secreted in, saliva but I ordered for pancreas it is slightly up but another enzyme for pancreas is normal, other labs are good, I am placing the orders for PTH and iron calcium, in the computer you can just call  lab to add on. ,

## 2020-12-05 LAB — SPECIMEN STATUS REPORT, ROLRST: NORMAL

## 2020-12-11 ENCOUNTER — HOSPITAL ENCOUNTER (OUTPATIENT)
Dept: LAB | Age: 75
Discharge: HOME | End: 2020-12-11
Attending: INTERNAL MEDICINE
Payer: MEDICAID

## 2020-12-11 ENCOUNTER — TRANSCRIBE ORDER (OUTPATIENT)
Dept: REGISTRATION | Age: 75
End: 2020-12-11

## 2020-12-11 ENCOUNTER — TELEPHONE (OUTPATIENT)
Dept: INTERNAL MEDICINE CLINIC | Age: 75
End: 2020-12-11

## 2020-12-11 ENCOUNTER — HOSPITAL ENCOUNTER (OUTPATIENT)
Dept: CT IMAGING | Age: 75
Discharge: HOME | End: 2020-12-11
Attending: INTERNAL MEDICINE
Payer: MEDICAID

## 2020-12-11 DIAGNOSIS — R11.0 NAUSEA: ICD-10-CM

## 2020-12-11 DIAGNOSIS — R10.33 UMBILICAL PAIN: Primary | ICD-10-CM

## 2020-12-11 DIAGNOSIS — K73.9 CHRONIC HEPATITIS (HCC): ICD-10-CM

## 2020-12-11 DIAGNOSIS — Z86.19 HISTORY OF HEPATITIS C: ICD-10-CM

## 2020-12-11 DIAGNOSIS — R10.33 PERIUMBILICAL ABDOMINAL PAIN: ICD-10-CM

## 2020-12-11 DIAGNOSIS — R19.8 STRAINING DURING BOWEL MOVEMENTS: ICD-10-CM

## 2020-12-11 LAB — CREAT SERPL-MCNC: 1.53 MG/DL (ref 0.55–1.02)

## 2020-12-11 PROCEDURE — 82565 ASSAY OF CREATININE: CPT

## 2020-12-11 PROCEDURE — 74011000636 HC RX REV CODE- 636: Performed by: INTERNAL MEDICINE

## 2020-12-11 PROCEDURE — 36415 COLL VENOUS BLD VENIPUNCTURE: CPT

## 2020-12-11 PROCEDURE — 74178 CT ABD&PLV WO CNTR FLWD CNTR: CPT

## 2020-12-11 RX ADMIN — IOPAMIDOL 100 ML: 755 INJECTION, SOLUTION INTRAVENOUS at 11:54

## 2020-12-11 NOTE — PROGRESS NOTES
Ask her to drink more water she has mild kidney insufficiency meaning mild renal, problem so she has to take care by drinking enough water drinking mindful water and not to take OTC ibuprofen or Aleve. ,

## 2020-12-11 NOTE — PROGRESS NOTES
Please call her that she has liver cirrhosis small gallstones, arthritis in the back and the hip joint also, diverticulosis in the colon eat high-fiber diet,, continue seeing liver specialist under [address] [place] [address], and continue current medications. Take Tylenol, for back pain in moderation like 500 mg twice a day not more than that having liver cirrhosis. she should not take ibuprofen or Aleve having mild kidney problems should not take meloxicam should not take, diclofenac if she is taking stop that,. ,

## 2020-12-17 ENCOUNTER — TELEPHONE (OUTPATIENT)
Dept: INTERNAL MEDICINE CLINIC | Age: 75
End: 2020-12-17

## 2021-01-25 ENCOUNTER — TELEPHONE (OUTPATIENT)
Dept: INTERNAL MEDICINE CLINIC | Age: 76
End: 2021-01-25

## 2021-01-25 NOTE — TELEPHONE ENCOUNTER
Patient called and stated she will not be able to come in on Thurday due to weather and wanted to know if she could possible come in on Wednesday. Please call the patient and let her know.

## 2021-01-27 ENCOUNTER — OFFICE VISIT (OUTPATIENT)
Dept: INTERNAL MEDICINE CLINIC | Age: 76
End: 2021-01-27
Payer: MEDICAID

## 2021-01-27 VITALS
DIASTOLIC BLOOD PRESSURE: 90 MMHG | OXYGEN SATURATION: 99 % | BODY MASS INDEX: 50.42 KG/M2 | SYSTOLIC BLOOD PRESSURE: 160 MMHG | RESPIRATION RATE: 18 BRPM | HEIGHT: 62 IN | HEART RATE: 72 BPM | WEIGHT: 274 LBS

## 2021-01-27 DIAGNOSIS — K74.69 OTHER CIRRHOSIS OF LIVER (HCC): ICD-10-CM

## 2021-01-27 DIAGNOSIS — E66.01 MORBID OBESITY (HCC): ICD-10-CM

## 2021-01-27 DIAGNOSIS — R10.33 PERIUMBILICAL ABDOMINAL PAIN: ICD-10-CM

## 2021-01-27 DIAGNOSIS — E83.52 HYPERCALCEMIA: ICD-10-CM

## 2021-01-27 DIAGNOSIS — Z86.19 HISTORY OF HEPATITIS C: ICD-10-CM

## 2021-01-27 DIAGNOSIS — F43.21 SITUATIONAL DEPRESSION: ICD-10-CM

## 2021-01-27 DIAGNOSIS — G47.30 SLEEP APNEA, UNSPECIFIED TYPE: Primary | ICD-10-CM

## 2021-01-27 DIAGNOSIS — M54.50 CHRONIC BILATERAL LOW BACK PAIN, UNSPECIFIED WHETHER SCIATICA PRESENT: ICD-10-CM

## 2021-01-27 DIAGNOSIS — G89.29 CHRONIC BILATERAL LOW BACK PAIN, UNSPECIFIED WHETHER SCIATICA PRESENT: ICD-10-CM

## 2021-01-27 DIAGNOSIS — M43.06 LUMBAR SPONDYLOLYSIS: ICD-10-CM

## 2021-01-27 PROCEDURE — 99214 OFFICE O/P EST MOD 30 MIN: CPT | Performed by: INTERNAL MEDICINE

## 2021-01-27 RX ORDER — SPIRONOLACTONE AND HYDROCHLOROTHIAZIDE 25; 25 MG/1; MG/1
1 TABLET ORAL EVERY MORNING
Qty: 30 TAB | Refills: 2 | Status: SHIPPED | OUTPATIENT
Start: 2021-01-27 | End: 2021-04-21

## 2021-01-27 RX ORDER — DULOXETIN HYDROCHLORIDE 30 MG/1
30 CAPSULE, DELAYED RELEASE ORAL 2 TIMES DAILY
Qty: 60 CAP | Refills: 2 | Status: SHIPPED | OUTPATIENT
Start: 2021-01-27 | End: 2021-04-22 | Stop reason: SDUPTHER

## 2021-01-27 RX ORDER — FAMOTIDINE 40 MG/1
40 TABLET, FILM COATED ORAL DAILY
Qty: 30 TAB | Refills: 2 | Status: SHIPPED | OUTPATIENT
Start: 2021-01-27 | End: 2021-05-09

## 2021-01-27 RX ORDER — AMLODIPINE BESYLATE 2.5 MG/1
2.5 TABLET ORAL DAILY
Qty: 30 TAB | Refills: 2 | Status: SHIPPED | OUTPATIENT
Start: 2021-01-27 | End: 2021-04-21

## 2021-01-27 RX ORDER — ESCITALOPRAM OXALATE 10 MG/1
10 TABLET ORAL DAILY
Qty: 30 TAB | Refills: 2 | Status: SHIPPED | OUTPATIENT
Start: 2021-01-27 | End: 2021-04-22 | Stop reason: SDUPTHER

## 2021-01-27 NOTE — PROGRESS NOTES
Manjit Tavarez is a 70 y.o. female and presents with Headache and Abdominal Pain (stomach and back pain )    Ms. Rand Gilliland had appointment tomorrow but she  requested to come today due to the  forecast for the snow shower tomorrow. She has vague complaints of diffuse abdominal pain. She is very vague in the history. I have done CT abdomen and pelvis she has some lumbar spondylosis. She is complaining of low back pain also complaining of headache today her blood pressure is elevated and that may be the reason for headache. She told me she told me that a lot of things going on with her and stress with the grandchildren and her son. She lives with her son who drinks alcohol a lot. Patient has quit drinking alcohol since long. Patient had history of bipolar disorder and depression. To be on low-dose of antidepression. Currently she only takes spironolactone 25 mg/day and duloxetine 20 mg twice a day. I made changes. No nausea. No vomiting. She agreed to do physical therapy and she has never done colonoscopy. She has history of hepatitis C and liver cirrhosis and used to see hepatologist. CT abdomen and pelvis shows very small gallstone and that cannot do that much of pain. She has done her labs. She has some hypercalcemia. I ordered PTH and ionized calcium. Seems like she has stopped taking PPI    Review of Systems    Review of Systems   Constitutional: Negative. HENT: Negative for sore throat. Respiratory: Negative for cough and wheezing. Cardiovascular: Negative. Gastrointestinal:        Vague diffuse abdominal pain./chronic. Genitourinary: Negative. Musculoskeletal: Positive for back pain. Negative for falls, myalgias and neck pain. Neurological: Negative for dizziness, tingling, sensory change and headaches. Psychiatric/Behavioral: Positive for depression. Negative for memory loss and substance abuse. The patient is not nervous/anxious and does not have insomnia.          Past Medical History: Diagnosis Date    Anemia     Asthma 8/21/2020    Bipolar 1 disorder ([facility] 8/21/2020    Chronic hepatitis ([facility] 8/21/2020    Chronic kidney disease (CKD), stage II (mild) 8/21/2020    Cirrhosis of liver ([facility] 8/21/2020    COPD (chronic obstructive pulmonary disease) ([facility] 8/21/2020    Depressive disorder 8/21/2020    Edema 8/21/2020    History of mood disorder 8/21/2020    Hypertension     Lumbar radiculopathy 8/21/2020    Morbid obesity ([facility] 8/21/2020    Osteoarthritis 8/21/2020    Pain, knee 8/21/2020    Pure hypercholesterolemia with target low density lipoprotein (LDL) cholesterol less than 130 mg/dL 8/21/2020    RUQ abdominal pain 8/21/2020    Sleep apnea 8/21/2020    Vitamin D deficiency 8/21/2020     Past Surgical History:   Procedure Laterality Date    HX COLONOSCOPY      HX COLONOSCOPY  1988    HX CYST INCISION AND DRAINAGE  1980    HX GYN      HX TOTAL ABDOMINAL HYSTERECTOMY  1976    IR BX LIVER NEEDLE W OTHER [address]  2014     Social History     Socioeconomic History    Marital status:      Spouse name: Not on file    Number of children: Not on file    Years of education: Not on file    Highest education level: Not on file   Tobacco Use    Smoking status: Former Smoker     Packs/day: 1.00    Smokeless tobacco: Never Used    Tobacco comment: QUIT 2014   Substance and Sexual Activity    Alcohol use: Never     Frequency: Never    Drug use: Never    Sexual activity: Not Currently     Family History   Problem Relation Age of Onset    Hypertension Mother     Hypertension Brother     Heart Disease Brother     Hypertension Maternal Grandmother      Current Outpatient Medications   Medication Sig Dispense Refill    escitalopram oxalate (LEXAPRO) 10 mg tablet Take 1 Tab by mouth daily. 30 Tab 2    spironolactone-hydrochlorothiazide (ALDACTAZIDE) 25-25 mg per tablet Take 1 Tab by mouth Every morning.  30 Tab 2    amLODIPine (NORVASC) 2.5 mg tablet Take 1 Tab by mouth daily. 30 Tab 2    DULoxetine (CYMBALTA) 30 mg capsule Take 1 Cap by mouth two (2) times a day. 60 Cap 2    famotidine (PEPCID) 40 mg tablet Take 1 Tab by mouth daily. 30 Tab 2    ondansetron (ZOFRAN ODT) 4 mg disintegrating tablet Take 1 Tab by mouth every eight (8) hours as needed for Nausea or Vomiting. 10 Tab 0    aspirin delayed-release 81 mg tablet Take 1 Tab by mouth daily.  fluticasone propionate (FLONASE) 50 mcg/actuation nasal spray 2 Sprays by Both Nostrils route daily. 1 Bottle 5    albuterol (PROVENTIL HFA, VENTOLIN HFA, PROAIR HFA) 90 mcg/actuation inhaler Take 2 Puffs by inhalation every four (4) hours as needed for Wheezing. 1 Inhaler 4    diclofenac (VOLTAREN) 1 % gel Apply 2 g to affected area two (2) times a day.  latanoprost (XALATAN) 0.005 % ophthalmic solution Administer 1 Drop to both eyes daily.  cyanocobalamin (VITAMIN B12) 500 mcg tablet Take 1 Tab by mouth daily.  cholecalciferol (VITAMIN D3) (1000 Units /25 mcg) tablet Take 1 Tab by mouth daily.  calcium-cholecalciferol, D3, (CALTRATE 600+D) tablet Take 1 Tab by mouth daily. No Known Allergies    Objective:  Visit Vitals  BP (!) 160/90 (BP 1 Location: Left arm, BP Patient Position: Sitting)   Pulse 72   Resp 18   Ht 5' 2\" (1.575 m)   Wt 274 lb (124.3 kg)   SpO2 99%   BMI 50.12 kg/m²       Physical Exam:   Constitutional: General Appearance: Pleasant walking with cane. . Level of Distress: NAD. Psychiatric: Mental Status: normal mood and affect Orientation: to time, place, and person. ,normal eye contact. Head: Head: normocephalic and atraumatic. Eyes: Pupils: PERRLA. Sclerae: non-icteric. Neck: Neck: supple, trachea midline, and no masses. Lymph Nodes: no cervical LAD. Thyroid: no enlargement or nodules and non-tender. Lungs: Respiratory effort: no dyspnea. Auscultation: no wheezing, rales/crackles, or rhonchi and breath sounds normal and good air movement.   Cardiovascular: Apical Impulse: not displaced. Heart Auscultation: normal S1 and S2; no murmurs, rubs, or gallops; and RRR. Neck vessels: no carotid bruits. Pulses including femoral / pedal: normal throughout. Abdomen: Bowel Sounds: normal. Inspection and Palpation: no tenderness, guarding, or masses and soft and non-distended. Liver: non-tender and no hepatomegaly. Spleen: non-tender and no splenomegaly. Musculoskeletal[de-identified] Extremities: no edema,no varicosities. No Calf tenderness. Neurologic: Gait and Station:  difficulty in coming out of the chair and walking with cane. Motor Strength normal right and left. Skin: Inspection and palpation: no rash, lesions, or ulcer. Results for orders placed or performed during the hospital encounter of 12/11/20   CREATININE   Result Value Ref Range    Creatinine 1.53 (H) 0.55 - 1.02 mg/dL       Assessment/Plan:      ICD-10-CM ICD-9-CM    1. Sleep apnea, unspecified type  G47.30 780.57    2. Chronic bilateral low back pain, unspecified whether sciatica present  M54.5 724.2 REFERRAL TO PHYSICAL THERAPY    G89.29 338.29    3. Hypercalcemia  E83.52 275.42 PTH INTACT      CALCIUM, IONIZED   4. Periumbilical abdominal pain  R10.33 789.05 REFERRAL TO GASTROENTEROLOGY   5. Situational depression  F43.21 309.0    6. Lumbar spondylolysis  M43.06 738.4 REFERRAL TO PHYSICAL THERAPY   7.  History of hepatitis C  Z86.19 V12.09 REFERRAL TO GASTROENTEROLOGY   8. Other cirrhosis of liver (HCC)  K74.69 571.5 REFERRAL TO GASTROENTEROLOGY   9. Morbid obesity (HCC)  E66.01 278.01      Orders Placed This Encounter    PTH INTACT    CALCIUM, IONIZED    REFERRAL TO GASTROENTEROLOGY     Referral Priority:   Routine     Referral Type:   Consultation     Referral Reason:   Specialty Services Required     Referred to Provider:   Jakub Pearson MD     Number of Visits Requested:   1    [facility]     Referral Priority:   Routine     Referral Type:   PT/OT/ST     Referral Reason:   Specialty Services Required     Referral Location:   [address] [place] [address]     Number of Visits Requested:   1    escitalopram oxalate (LEXAPRO) 10 mg tablet     Sig: Take 1 Tab by mouth daily. Dispense:  30 Tab     Refill:  2    spironolactone-hydrochlorothiazide (ALDACTAZIDE) 25-25 mg per tablet     Sig: Take 1 Tab by mouth Every morning. Dispense:  30 Tab     Refill:  2    amLODIPine (NORVASC) 2.5 mg tablet     Sig: Take 1 Tab by mouth daily. Dispense:  30 Tab     Refill:  2    DULoxetine (CYMBALTA) 30 mg capsule     Sig: Take 1 Cap by mouth two (2) times a day. Dispense:  60 Cap     Refill:  2    famotidine (PEPCID) 40 mg tablet     Sig: Take 1 Tab by mouth daily. Dispense:  30 Tab     Refill:  2       Hypertension uncontrolled spironolactone change to spironolactone hydrochlorothiazide 25/25 mg once a day. Started on amlodipine 2.5 mg once a day. Follow-up in 2 months. Diet low in sodium. Diffuse abdominal pain periumbilical and lower back pain I am not sure whether lumbar  spondylosis is causing referred pain or she has something growing in her abdomen having history of liver cirrhosis and hepatitis C I referred her to gastroenterologist also and she will need colonoscopy. I had ordered CT abdomen and pelvis with and without contrast showing, mild diverticulosis and small gallstone and lumbar spondylosis. Started on famotidine. It seems like she has stopped taking omeprazole. Having history of bipolar disorder and depression and currently having stress going on with her son who is drinking a lot and her grandchildren, increase duloxetine 30 mg twice a day and started on escitalopram 10 mg once a day she agreed and she wanted antidepression. , She has quit drinking alcohol since long having liver cirrhosis history. Lower back pain due to DJD and lumbar spondylosis referred her for physical therapy twice a week for 6 weeks and continued on duloxetine and dose increased.  She should try to lose weight. If required I will order MRI of lumbosacral spine. She walks with cane. Hypercalcemia ordered PTH and ionized calcium. Headache due to uncontrolled blood pressure. I made changes in adjustment in the medicine dose. Morbid obesity lifestyle modification. Recommended to read the food labels and mindful and watching the diet 1600-calorie diet plan. I had spent time in educating her and counseling her. Refills given. She agreed with the plan. She appreciated. Follow-up in 2 months. Referral made. lose weight, increase physical activity, follow low fat diet, follow low salt diet, continue present plan, have labs drawn prior to ROV, call if any problems    There are no Patient Instructions on file for this visit. Follow-up and Dispositions    · Return in about 2 months (around 3/27/2021) for ref dr Lynnette Diana ,f/u for chronic pain ,and on new med,htn monitoring.

## 2021-01-28 LAB
CA-I SERPL ISE-MCNC: 5.2 MG/DL (ref 4.5–5.6)
PTH-INTACT SERPL-MCNC: 39 PG/ML (ref 15–65)

## 2021-02-11 ENCOUNTER — TELEPHONE (OUTPATIENT)
Dept: INTERNAL MEDICINE CLINIC | Age: 76
End: 2021-02-11

## 2021-02-11 NOTE — TELEPHONE ENCOUNTER
Patient left message c/o cough, ha, body aches, and runny nose. Daughter recently tested positive for covid. Spoke with patient and advised her to go to get tested at the covid clinic.

## 2021-02-15 ENCOUNTER — OFFICE VISIT (OUTPATIENT)
Dept: PRIMARY CARE CLINIC | Age: 76
End: 2021-02-15
Payer: MEDICAID

## 2021-02-15 VITALS
DIASTOLIC BLOOD PRESSURE: 74 MMHG | HEART RATE: 100 BPM | OXYGEN SATURATION: 97 % | TEMPERATURE: 97.6 F | SYSTOLIC BLOOD PRESSURE: 120 MMHG | RESPIRATION RATE: 20 BRPM

## 2021-02-15 DIAGNOSIS — Z20.822 SUSPECTED 2019 NOVEL CORONAVIRUS INFECTION: ICD-10-CM

## 2021-02-15 DIAGNOSIS — Z20.822 CONTACT WITH AND (SUSPECTED) EXPOSURE TO COVID-19: Primary | ICD-10-CM

## 2021-02-15 DIAGNOSIS — R05.9 COUGH: ICD-10-CM

## 2021-02-15 PROCEDURE — 99213 OFFICE O/P EST LOW 20 MIN: CPT | Performed by: NURSE PRACTITIONER

## 2021-02-15 NOTE — PROGRESS NOTES
Roxy Kingston is a 70 y.o. female who was seen in clinic today (2/15/2021) for an acute visit. Assessment/Plan:            * COVID-19 sample collected and submitted       * Patient given detailed CDC instructions contained within After Visit Summary    Diagnoses and all orders for this visit:    1. Contact with and (suspected) exposure to covid-19  -     NOVEL CORONAVIRUS (COVID-19)    2. Cough    3. Suspected 2019 novel coronavirus infection       known covid exposure. Discussed expected course/resolution/complications of diagnosis in detail with patient. Advised pt on CDC guidance, OTC medications for symptom management, red flags reviewed and should develop to seek emergency medical attn. Reviewed with her that COVID-19 pandemic is an evolving situation with rapidly changing recommendations & guidelines, continue to practice hand hygiene, social distancing, wearing of facial coverings. Regardless of testing results, should still follow CDC guidelines as there is a chance of a false negative, such as a poor sample collection or being too soon to test after exposure. Medical decisions are made based on the the best information available at the time. Recommended she stay tuned for updates published by trusted sources and to advise your PCP of any unexpected changes in clinical condition     Subjective: [facility] was seen today for Concern For COVID-19 (Coronavirus), Cough, Breathing Problem, Headache, and Nausea  Exposure occurred approx. last Friday 2/5 from daughter who tested pos covid 19, symptoms began this past Sat 2/13/2021. Has been taking OTC cold meds with relief, symptoms are mild and not worsening. He denies a recent history/current: loss of smell/taste, fever. Non user of tob. Travel Screening     Question   Response    In the last month, have you been in contact with someone who was confirmed or suspected to have Coronavirus / COVID-19?   Yes    Have you had a COVID-19 viral test in the last 14 days? No    Do you have any of the following new or worsening symptoms? Cough; Shortness of breath    Have you traveled internationally in the last month? No      Travel History   Travel since 01/15/21     No documented travel since 01/15/21          ROS - Pertinent items are noted in HPI    Patient Active Problem List   Diagnosis Code    Chronic hepatitis ([facility] K73.9    Anemia D64.9    Asthma J45.909    Bipolar 1 disorder ([facility] F31.9    Chronic kidney disease (CKD), stage II (mild) N18.2    COPD (chronic obstructive pulmonary disease) ([facility] J44.9    Cirrhosis of liver ([facility] K74.60    Depressive disorder F32.9    Edema R60.9    Vitamin D deficiency E55.9    Sleep apnea G47.30    Pure hypercholesterolemia with target low density lipoprotein (LDL) cholesterol less than 130 mg/dL E78.00    Pain, knee M25.569    RUQ abdominal pain R10.11    Osteoarthritis M19.90    Morbid obesity ([facility] E66.01    Lumbar radiculopathy M54.16    History of mood disorder Z86.59    History of heart attack I25.2    Administrative encounter Z02.9    Essential hypertension I10    History of hepatitis C Y77.55    Periumbilical abdominal pain R10.33    Straining during bowel movements R19.8    Hypercalcemia E83.52    Chronic bilateral low back pain, unspecified whether sciatica present M54.5, G89.29    Situational depression F43.21     Home Medications    Medication Sig Start Date End Date Taking? Authorizing Provider   escitalopram oxalate (LEXAPRO) 10 mg tablet Take 1 Tab by mouth daily. 1/27/21  Yes Layne Gonzales MD   spironolactone-hydrochlorothiazide (ALDACTAZIDE) 25-25 mg per tablet Take 1 Tab by mouth Every morning. 1/27/21  Yes Layne Gonzales MD   amLODIPine (NORVASC) 2.5 mg tablet Take 1 Tab by mouth daily. 1/27/21  Yes Layne Gonzales MD   DULoxetine (CYMBALTA) 30 mg capsule Take 1 Cap by mouth two (2) times a day.  1/27/21  Yes Layne Gonzales MD   famotidine (PEPCID) 40 mg tablet Take 1 Tab by mouth daily. 1/27/21  Yes Denise Haq MD   ondansetron (ZOFRAN ODT) 4 mg disintegrating tablet Take 1 Tab by mouth every eight (8) hours as needed for Nausea or Vomiting. 12/3/20  Yes Denise Haq MD   aspirin delayed-release 81 mg tablet Take 1 Tab by mouth daily. Yes Provider, Historical   fluticasone propionate (FLONASE) 50 mcg/actuation nasal spray 2 Sprays by Both Nostrils route daily. 10/28/20  Yes Denise Haq MD   albuterol (PROVENTIL HFA, VENTOLIN HFA, PROAIR HFA) 90 mcg/actuation inhaler Take 2 Puffs by inhalation every four (4) hours as needed for Wheezing. 10/28/20  Yes Denise Haq MD   diclofenac (VOLTAREN) 1 % gel Apply 2 g to affected area two (2) times a day. 6/22/20  Yes Provider, Historical   latanoprost (XALATAN) 0.005 % ophthalmic solution Administer 1 Drop to both eyes daily. 6/13/20  Yes Provider, Historical   cyanocobalamin (VITAMIN B12) 500 mcg tablet Take 1 Tab by mouth daily. Yes Provider, Historical   cholecalciferol (VITAMIN D3) (1000 Units /25 mcg) tablet Take 1 Tab by mouth daily. Yes Provider, Historical   calcium-cholecalciferol, D3, (CALTRATE 600+D) tablet Take 1 Tab by mouth daily. Yes Provider, Historical      No Known Allergies       Objective:   Physical Exam  General:  alert, cooperative, no distress, BMI >50   Ears: Normal external ear canals AU   Sinuses: Normal paranasal sinuses without tenderness   Neck: supple, symmetrical, trachea midline. Heart: normal rate, regular rhythm   Lungs: No dyspneic or audible respiratory distress. Visit Vitals  /74 (BP 1 Location: Left upper arm, BP Patient Position: Sitting, BP Cuff Size: Adult)   Pulse 100   Temp 97.6 °F (36.4 °C) (Oral)   Resp 20   SpO2 97%         Disclaimer:        Medication risks/benefits/costs/interactions/alternatives discussed with patient. She was given an after visit summary which includes diagnoses, current medications, & vitals.   She expressed understanding with the diagnosis and plan. Aspects of this note may have been generated using voice recognition software. Despite editing, there may be some syntax errors.        Guille Mora, MICHELLE

## 2021-02-17 ENCOUNTER — TELEPHONE (OUTPATIENT)
Dept: PRIMARY CARE CLINIC | Age: 76
End: 2021-02-17

## 2021-02-17 LAB — SARS-COV-2, NAA: DETECTED

## 2021-02-17 NOTE — TELEPHONE ENCOUNTER
The patient was called for notification of a POSITIVE test result for COVID-19. The following information was given to the patient:     The COVID-19 test result was positive   Mild and stable symptoms are managed at home     Treatment of coronavirus does not require an antibiotic    For most persons with COVID-19 illness, isolation and precautions can generally be discontinued 10 days after symptom onset and resolution of fever for at least 24 hours, without the use of fever-reducing medications, and with improvement of other symptoms.  A limited number of persons with severe illness or severe immunosuppression may produce replication-competent virus beyond 10 days that may warrant extending duration of isolation and precautions for up to 20 days after symptom onset.  For persons who never develop symptoms, isolation and other precautions can be discontinued 10 days after the date of their first positive RT-PCR test for SARS-CoV-2 RNA.  Patient was instructed to remain isolated until 2/22  [facility] hands often with soap and water for at least 20 seconds or alternatively use hand  with at least 60% alcohol content   Cover coughs and sneezes   Wear a mask when around others if possible   Clean all high-touch surfaces every day, such as doorknobs and cellphones   Continually monitor symptoms.  Contact your medical provider if symptoms are worsening, such as difficulty breathing   For more information visit the CDC website: DotProte[employer].gl

## 2021-03-05 RX ORDER — ONDANSETRON 4 MG/1
4 TABLET, ORALLY DISINTEGRATING ORAL
Qty: 10 TAB | Refills: 0 | Status: SHIPPED | OUTPATIENT
Start: 2021-03-05 | End: 2021-03-12 | Stop reason: ALTCHOICE

## 2021-03-05 RX ORDER — ALBUTEROL SULFATE 0.83 MG/ML
SOLUTION RESPIRATORY (INHALATION)
Qty: 375 ML | Refills: 11 | Status: SHIPPED | OUTPATIENT
Start: 2021-03-05

## 2021-03-12 ENCOUNTER — OFFICE VISIT (OUTPATIENT)
Dept: INTERNAL MEDICINE CLINIC | Age: 76
End: 2021-03-12
Payer: MEDICAID

## 2021-03-12 VITALS
HEART RATE: 72 BPM | TEMPERATURE: 98 F | DIASTOLIC BLOOD PRESSURE: 70 MMHG | RESPIRATION RATE: 18 BRPM | OXYGEN SATURATION: 97 % | HEIGHT: 62 IN | WEIGHT: 267 LBS | BODY MASS INDEX: 49.13 KG/M2 | SYSTOLIC BLOOD PRESSURE: 122 MMHG

## 2021-03-12 DIAGNOSIS — K74.69 OTHER CIRRHOSIS OF LIVER (HCC): ICD-10-CM

## 2021-03-12 DIAGNOSIS — I10 ESSENTIAL HYPERTENSION: ICD-10-CM

## 2021-03-12 DIAGNOSIS — E66.01 MORBID OBESITY (HCC): ICD-10-CM

## 2021-03-12 DIAGNOSIS — G89.29 CHRONIC BILATERAL LOW BACK PAIN, UNSPECIFIED WHETHER SCIATICA PRESENT: ICD-10-CM

## 2021-03-12 DIAGNOSIS — Z86.59 HISTORY OF MOOD DISORDER: ICD-10-CM

## 2021-03-12 DIAGNOSIS — I25.2 HISTORY OF MYOCARDIAL INFARCTION: Primary | ICD-10-CM

## 2021-03-12 DIAGNOSIS — G47.30 SLEEP APNEA, UNSPECIFIED TYPE: ICD-10-CM

## 2021-03-12 DIAGNOSIS — Z02.9 ADMINISTRATIVE ENCOUNTER: ICD-10-CM

## 2021-03-12 DIAGNOSIS — M54.50 CHRONIC BILATERAL LOW BACK PAIN, UNSPECIFIED WHETHER SCIATICA PRESENT: ICD-10-CM

## 2021-03-12 DIAGNOSIS — Z86.19 HISTORY OF HEPATITIS C: ICD-10-CM

## 2021-03-12 DIAGNOSIS — Z01.818 PRE-OPERATIVE CLEARANCE: ICD-10-CM

## 2021-03-12 DIAGNOSIS — F32.A DEPRESSIVE DISORDER: ICD-10-CM

## 2021-03-12 DIAGNOSIS — F43.21 SITUATIONAL DEPRESSION: ICD-10-CM

## 2021-03-12 DIAGNOSIS — J44.9 CHRONIC OBSTRUCTIVE PULMONARY DISEASE, UNSPECIFIED COPD TYPE (HCC): ICD-10-CM

## 2021-03-12 PROCEDURE — 99213 OFFICE O/P EST LOW 20 MIN: CPT | Performed by: INTERNAL MEDICINE

## 2021-03-12 RX ORDER — HYDROCHLOROTHIAZIDE 25 MG/1
TABLET ORAL
Qty: 90 TAB | Refills: 1 | Status: SHIPPED | OUTPATIENT
Start: 2021-03-12 | End: 2021-03-12 | Stop reason: ALTCHOICE

## 2021-03-12 NOTE — PROGRESS NOTES
Chief Complaint   Patient presents with    Pre-op Exam     clearance for dental surgery      Visit Vitals  /70 (BP 1 Location: Left upper arm, BP Patient Position: Sitting)   Pulse 76   Temp 98 °F (36.7 °C)   Resp 18   Ht 5' 2\" (1.575 m)   Wt 267 lb (121.1 kg)   SpO2 97%   BMI 48.83 kg/m²

## 2021-03-13 NOTE — PROGRESS NOTES
Richa Cope is a 70 y.o. female and presents with Pre-op Exam (clearance for dental surgery )    Ms. Hernan Rachel came for preop clearance she is going to have, removal of all teeth and gum she brought the paper, but she told dentist that she had history of to myocardial infarction in 2017, I am not aware of that, I am aware that she has, history of hepatitis C which was treated at [facility] also she has history of liver cirrhosis, and history of alcohol abuse in the past, with history of bipolar disorder in the past, also she used to have history of smoking and being ex-smoker. Recently her blood pressure is controlled being on spironolactone hydrochlorothiazide 25/25 mg/day along with amlodipine 2.5 mg/day also getting duloxetine. Also getting vitamin supplementation and albuterol inhaler. She is not reliable good historian,She told me she used to see Dr. Carlos Ware as her cardiologist that I am not aware at all                   Review of Systems    Review of Systems   Constitutional: Negative. HENT: Positive for congestion. Negative for sore throat. Eyes: Negative for blurred vision. Respiratory: Negative. Cardiovascular: Negative. Gastrointestinal: Negative. Genitourinary: Negative. Musculoskeletal: Negative. History of DJD walking with cane   Neurological: Negative for dizziness, tingling, sensory change and headaches. Psychiatric/Behavioral: Negative for depression and memory loss. The patient is not nervous/anxious and does not have insomnia.          History of bipolar disorder and now history of mild depression and stress at home        Past Medical History:   Diagnosis Date    Anemia     Asthma 8/21/2020    Bipolar 1 disorder ([address] 8/21/2020    Chronic hepatitis ([facility] 8/21/2020    Chronic kidney disease (CKD), stage II (mild) 8/21/2020    Cirrhosis of liver ([address] 8/21/2020    COPD (chronic obstructive pulmonary disease) ([address] 8/21/2020    Depressive disorder 8/21/2020    Edema 8/21/2020    History of mood disorder 8/21/2020    Hypertension     Lumbar radiculopathy 8/21/2020    Morbid obesity ([address] 8/21/2020    Osteoarthritis 8/21/2020    Pain, knee 8/21/2020    Pure hypercholesterolemia with target low density lipoprotein (LDL) cholesterol less than 130 mg/dL 8/21/2020    RUQ abdominal pain 8/21/2020    Sleep apnea 8/21/2020    Vitamin D deficiency 8/21/2020     Past Surgical History:   Procedure Laterality Date    HX COLONOSCOPY      HX COLONOSCOPY  1988    HX CYST INCISION AND DRAINAGE  1980    HX GYN      HX TOTAL ABDOMINAL HYSTERECTOMY  1976    IR BX LIVER NEEDLE W OTHER [address]  2014     Social History     Socioeconomic History    Marital status:      Spouse name: Not on file    Number of children: Not on file    Years of education: Not on file    Highest education level: Not on file   Tobacco Use    Smoking status: Former Smoker     Packs/day: 1.00    Smokeless tobacco: Never Used    Tobacco comment: QUIT 2014   Substance and Sexual Activity    Alcohol use: Never     Frequency: Never     Binge frequency: Never    Drug use: Never    Sexual activity: Not Currently     Family History   Problem Relation Age of Onset    Hypertension Mother     Hypertension Brother     Heart Disease Brother     Hypertension Maternal Grandmother      Current Outpatient Medications   Medication Sig Dispense Refill    albuterol (PROVENTIL VENTOLIN) 2.5 mg /3 mL (0.083 %) nebu USE 1 VIAL IN NEBULIZER EVERY 4-6 HOURS AS NEEDED 375 mL 11    ProAir HFA 90 mcg/actuation inhaler INHALE 2 PUFFS BY MOUTH EVERY 4 HOURS AS NEEDED FOR WHEEZE 25.5 Inhaler 5    escitalopram oxalate (LEXAPRO) 10 mg tablet Take 1 Tab by mouth daily. 30 Tab 2    spironolactone-hydrochlorothiazide (ALDACTAZIDE) 25-25 mg per tablet Take 1 Tab by mouth Every morning. 30 Tab 2    amLODIPine (NORVASC) 2.5 mg tablet Take 1 Tab by mouth daily.  30 Tab 2    DULoxetine (CYMBALTA) 30 mg capsule Take 1 Cap by mouth two (2) times a day. 60 Cap 2    famotidine (PEPCID) 40 mg tablet Take 1 Tab by mouth daily. 30 Tab 2    aspirin delayed-release 81 mg tablet Take 1 Tab by mouth daily.  fluticasone propionate (FLONASE) 50 mcg/actuation nasal spray 2 Sprays by Both Nostrils route daily. 1 Bottle 5    diclofenac (VOLTAREN) 1 % gel Apply 2 g to affected area two (2) times a day.  latanoprost (XALATAN) 0.005 % ophthalmic solution Administer 1 Drop to both eyes daily.  cyanocobalamin (VITAMIN B12) 500 mcg tablet Take 1 Tab by mouth daily.  cholecalciferol (VITAMIN D3) (1000 Units /25 mcg) tablet Take 1 Tab by mouth daily.  calcium-cholecalciferol, D3, (CALTRATE 600+D) tablet Take 1 Tab by mouth daily. No Known Allergies    Objective:  Visit Vitals  /70 (BP 1 Location: Right upper arm, BP Patient Position: Sitting, BP Cuff Size: Large adult)   Pulse 72   Temp 98 °F (36.7 °C)   Resp 18   Ht 5' 2\" (1.575 m)   Wt 267 lb (121.1 kg)   SpO2 97%   BMI 48.83 kg/m²       Physical Exam:   Constitutional: General Appearance: Pleasant. Level of Distress: NAD. Psychiatric: Mental Status: normal mood and affect Orientation: to time, place, and person. ,normal eye contact. Poor insight walking with cane  Head: Head: normocephalic and atraumatic. Eyes: Pupils: PERRLA. Sclerae: non-icteric. Neck: Neck: supple, trachea midline, and no masses. Lymph Nodes: no cervical LAD. Thyroid: no enlargement or nodules and non-tender. Lungs: Respiratory effort: no dyspnea. Auscultation: no wheezing, rales/crackles, or rhonchi and breath sounds normal and good air movement. Cardiovascular: Apical Impulse: not displaced. Heart Auscultation: normal S1 and S2; no murmurs, rubs, or gallops; and RRR. Neck vessels: no carotid bruits. Pulses including femoral / pedal: normal throughout. Abdomen: Bowel Sounds: normal. Inspection and Palpation: no tenderness, guarding, or masses and soft and non-distended.  Liver: non-tender and no hepatomegaly. Spleen: non-tender and no splenomegaly. Musculoskeletal[de-identified] Extremities: no edema,no varicosities. No Calf tenderness. Neurologic: Gait and Station: normal gait and station. Walking with cane motor Strength normal right and left. Skin: Inspection and palpation: no rash, lesions, or ulcer. Results for orders placed or performed in visit on 02/15/21   NOVEL CORONAVIRUS (COVID-19)   Result Value Ref Range    SARS-CoV-2, BUDDY Detected (A) Not Detected       Assessment/Plan:      ICD-10-CM ICD-9-CM    1. History of myocardial infarction  I25.2 412 REFERRAL TO CARDIOLOGY   2. Essential hypertension  I10 401.9    3. Administrative encounter  Z02.9 V68.9    4. Other cirrhosis of liver (HCC)  K74.69 571.5    5. History of hepatitis C  Z86.19 V12.09    6. Chronic obstructive pulmonary disease, unspecified COPD type ([facility]  J44.9 496    7. Sleep apnea, unspecified type  G47.30 780.57    8. Morbid obesity ([facility]  E66.01 278.01    9. Depressive disorder  F32.9 311    10. Chronic bilateral low back pain, unspecified whether sciatica present  M54.5 724.2     G89.29 338.29    11. History of mood disorder  Z86.59 V11.1    12. Situational depression  F43.21 309.0    13. Pre-operative clearance  Z01.818 V72.84 REFERRAL TO CARDIOLOGY     Orders Placed This Encounter    REFERRAL TO CARDIOLOGY     Referral Priority:   Routine     Referral Type:   Consultation     Referral Reason:   Specialty Services Required     Referred to Provider:   Long Welch MD     Requested Specialty:   Cardiology     Number of Visits Requested:   1       Preoperative clearance administrative encounter, she has  underlying disorders of hypertension, history of hepatitis C treated with antiviral medicine, liver cirrhosis, history of, alcohol abuse. History of ex-smoker,, history of bipolar disorder and mild depression.   Today her blood pressure is controlled taking spironolactone hydrochlorothiazide 25/25 mg/day and amlodipine 2.5 mg/day also having DJD with chronic musculoskeletal pain, and depression continued on duloxetine, 30 mg once a day and escitalopram 10 mg/day. Continued on famotidine. However she told the dentist that she had history of to myocardial infarction in 2017 that I am not aware  and according to her she used to see Dr. Maria Esther Schulz her cardiologist, she is not a good historian, I referred her to Dr. Tiffanie Barrett, and she immediately told that she used to see Dr. Tiffanie Barrett in the past, I will need cardiac clearance, having history of myocardial infarction, before clearing her, for having dental extractions of all her teeth and dental surgery. She agreed. Follow-up as scheduled. lose weight, continue present plan, call if any problems,  She has appointment with me on 30 March, recommended to keep that appointment h. There are no Patient Instructions on file for this visit.

## 2021-04-16 RX ORDER — PANTOPRAZOLE SODIUM 40 MG/1
TABLET, DELAYED RELEASE ORAL
Qty: 30 TAB | Refills: 5 | Status: SHIPPED | OUTPATIENT
Start: 2021-04-16 | End: 2021-12-28

## 2021-04-22 ENCOUNTER — TELEPHONE (OUTPATIENT)
Dept: INTERNAL MEDICINE CLINIC | Age: 76
End: 2021-04-22

## 2021-04-22 RX ORDER — ESCITALOPRAM OXALATE 10 MG/1
10 TABLET ORAL DAILY
Qty: 30 TAB | Refills: 4 | Status: SHIPPED | OUTPATIENT
Start: 2021-04-22 | End: 2021-12-17 | Stop reason: SDUPTHER

## 2021-04-22 RX ORDER — DULOXETIN HYDROCHLORIDE 30 MG/1
30 CAPSULE, DELAYED RELEASE ORAL 2 TIMES DAILY
Qty: 60 CAP | Refills: 4 | Status: SHIPPED | OUTPATIENT
Start: 2021-04-22 | End: 2021-12-28

## 2021-04-22 NOTE — TELEPHONE ENCOUNTER
[facility] sent in a90 day prescription refill request for:      Duloxetine HCL DR 30 MG CAP    Qty: 180.0    Take 1 Capsule By Mouth Twice A Day    Escitalopram 10 mg tablet    Qty: 90    Take 1 Tablet By Mouth Every Day    LOV 3/12    FOV nothing

## 2021-05-07 ENCOUNTER — HOSPITAL ENCOUNTER (EMERGENCY)
Age: 76
Discharge: HOME | End: 2021-05-07
Attending: EMERGENCY MEDICINE
Payer: MEDICAID

## 2021-05-07 ENCOUNTER — APPOINTMENT (OUTPATIENT)
Dept: CT IMAGING | Age: 76
End: 2021-05-07
Attending: EMERGENCY MEDICINE
Payer: MEDICAID

## 2021-05-07 VITALS
HEIGHT: 60 IN | DIASTOLIC BLOOD PRESSURE: 80 MMHG | RESPIRATION RATE: 16 BRPM | HEART RATE: 72 BPM | SYSTOLIC BLOOD PRESSURE: 129 MMHG | BODY MASS INDEX: 53.01 KG/M2 | TEMPERATURE: 98.4 F | OXYGEN SATURATION: 99 % | WEIGHT: 270 LBS

## 2021-05-07 DIAGNOSIS — G43.911 INTRACTABLE MIGRAINE WITH STATUS MIGRAINOSUS, UNSPECIFIED MIGRAINE TYPE: Primary | ICD-10-CM

## 2021-05-07 DIAGNOSIS — N18.9 CHRONIC KIDNEY DISEASE, UNSPECIFIED CKD STAGE: ICD-10-CM

## 2021-05-07 LAB
ALBUMIN SERPL-MCNC: 3.5 G/DL (ref 3.5–5)
ALBUMIN/GLOB SERPL: 0.7 {RATIO} (ref 1.1–2.2)
ALP SERPL-CCNC: 81 U/L (ref 45–117)
ALT SERPL-CCNC: 15 U/L (ref 12–78)
ANION GAP SERPL CALC-SCNC: 8 MMOL/L (ref 5–15)
AST SERPL W P-5'-P-CCNC: 14 U/L (ref 15–37)
BASOPHILS # BLD: 0 K/UL (ref 0–0.1)
BASOPHILS NFR BLD: 0 % (ref 0–1)
BILIRUB SERPL-MCNC: 0.5 MG/DL (ref 0.2–1)
BUN SERPL-MCNC: 26 MG/DL (ref 6–20)
BUN/CREAT SERPL: 12 (ref 12–20)
CA-I BLD-MCNC: 9.7 MG/DL (ref 8.5–10.1)
CHLORIDE SERPL-SCNC: 107 MMOL/L (ref 97–108)
CO2 SERPL-SCNC: 25 MMOL/L (ref 21–32)
CREAT SERPL-MCNC: 2.22 MG/DL (ref 0.55–1.02)
DIFFERENTIAL METHOD BLD: ABNORMAL
EOSINOPHIL # BLD: 0.2 K/UL (ref 0–0.4)
EOSINOPHIL NFR BLD: 4 % (ref 0–7)
ERYTHROCYTE [DISTWIDTH] IN BLOOD BY AUTOMATED COUNT: 15.1 % (ref 11.5–14.5)
GLOBULIN SER CALC-MCNC: 4.7 G/DL (ref 2–4)
GLUCOSE SERPL-MCNC: 91 MG/DL (ref 65–100)
HCT VFR BLD AUTO: 35.2 % (ref 35–47)
HGB BLD-MCNC: 10.8 G/DL (ref 11.5–16)
IMM GRANULOCYTES # BLD AUTO: 0 K/UL (ref 0–0.04)
IMM GRANULOCYTES NFR BLD AUTO: 0 % (ref 0–0.5)
LYMPHOCYTES # BLD: 1.2 K/UL (ref 0.8–3.5)
LYMPHOCYTES NFR BLD: 25 % (ref 12–49)
MCH RBC QN AUTO: 23 PG (ref 26–34)
MCHC RBC AUTO-ENTMCNC: 30.7 G/DL (ref 30–36.5)
MCV RBC AUTO: 75.1 FL (ref 80–99)
MONOCYTES # BLD: 0.4 K/UL (ref 0–1)
MONOCYTES NFR BLD: 8 % (ref 5–13)
NEUTS SEG # BLD: 2.8 K/UL (ref 1.8–8)
NEUTS SEG NFR BLD: 63 % (ref 32–75)
NRBC # BLD: 0 K/UL (ref 0–0.01)
NRBC BLD-RTO: 0 PER 100 WBC
PLATELET # BLD AUTO: 283 K/UL (ref 150–400)
PMV BLD AUTO: 10.1 FL (ref 8.9–12.9)
POTASSIUM SERPL-SCNC: 4.7 MMOL/L (ref 3.5–5.1)
PROT SERPL-MCNC: 8.2 G/DL (ref 6.4–8.2)
RBC # BLD AUTO: 4.69 M/UL (ref 3.8–5.2)
SODIUM SERPL-SCNC: 140 MMOL/L (ref 136–145)
WBC # BLD AUTO: 4.5 K/UL (ref 3.6–11)

## 2021-05-07 PROCEDURE — 36415 COLL VENOUS BLD VENIPUNCTURE: CPT

## 2021-05-07 PROCEDURE — 74011636637 HC RX REV CODE- 636/637: Performed by: EMERGENCY MEDICINE

## 2021-05-07 PROCEDURE — 80053 COMPREHEN METABOLIC PANEL: CPT

## 2021-05-07 PROCEDURE — 99282 EMERGENCY DEPT VISIT SF MDM: CPT

## 2021-05-07 PROCEDURE — 85025 COMPLETE CBC W/AUTO DIFF WBC: CPT

## 2021-05-07 PROCEDURE — 96372 THER/PROPH/DIAG INJ SC/IM: CPT

## 2021-05-07 PROCEDURE — 70450 CT HEAD/BRAIN W/O DYE: CPT

## 2021-05-07 RX ORDER — SUMATRIPTAN 6 MG/.5ML
6 INJECTION, SOLUTION SUBCUTANEOUS
Status: COMPLETED | OUTPATIENT
Start: 2021-05-07 | End: 2021-05-07

## 2021-05-07 RX ORDER — SUMATRIPTAN 50 MG/1
50 TABLET, FILM COATED ORAL
Qty: 9 TAB | Refills: 0 | Status: SHIPPED | OUTPATIENT
Start: 2021-05-07 | End: 2021-05-07

## 2021-05-07 RX ADMIN — SUMATRIPTAN 6 MG: 6 INJECTION, SOLUTION SUBCUTANEOUS at 14:16

## 2021-05-07 NOTE — ED TRIAGE NOTES
[facility] pt stated that she has been having a terrible HA since Sunday; went to pt [facility] yesterday was given sumatriptan 50 mg and was told that if XAVIER persists to come to ED for further evaluation; c/o nausea, dizziness, and blurred vision

## 2021-05-07 NOTE — DISCHARGE INSTRUCTIONS
Thank you! Thank you for allowing me to care for you in the emergency department. I sincerely hope that you are satisfied with your visit today. It is my goal to provide you with excellent care. Below you will find a list of your labs and imaging from your visit today. Should you have any questions regarding these results please do not hesitate to call the emergency department. Labs -     Recent Results (from the past 12 hour(s))   CBC WITH AUTOMATED DIFF    Collection Time: 05/07/21 12:00 PM   Result Value Ref Range    WBC 4.5 3.6 - 11.0 K/uL    RBC 4.69 3.80 - 5.20 M/uL    HGB 10.8 (L) 11.5 - 16.0 g/dL    HCT 35.2 35.0 - 47.0 %    MCV 75.1 (L) 80.0 - 99.0 FL    MCH 23.0 (L) 26.0 - 34.0 PG    MCHC 30.7 30.0 - 36.5 g/dL    RDW 15.1 (H) 11.5 - 14.5 %    PLATELET 307 802 - 938 K/uL    MPV 10.1 8.9 - 12.9 FL    NRBC 0.0 0.0  WBC    ABSOLUTE NRBC 0.00 0.00 - 0.01 K/uL    NEUTROPHILS 63 32 - 75 %    LYMPHOCYTES 25 12 - 49 %    MONOCYTES 8 5 - 13 %    EOSINOPHILS 4 0 - 7 %    BASOPHILS 0 0 - 1 %    IMMATURE GRANULOCYTES 0 0 - 0.5 %    ABS. NEUTROPHILS 2.8 1.8 - 8.0 K/UL    ABS. LYMPHOCYTES 1.2 0.8 - 3.5 K/UL    ABS. MONOCYTES 0.4 0.0 - 1.0 K/UL    ABS. EOSINOPHILS 0.2 0.0 - 0.4 K/UL    ABS. BASOPHILS 0.0 0.0 - 0.1 K/UL    ABS. IMM. GRANS. 0.0 0.00 - 0.04 K/UL    DF AUTOMATED     METABOLIC PANEL, COMPREHENSIVE    Collection Time: 05/07/21 12:00 PM   Result Value Ref Range    Sodium 140 136 - 145 mmol/L    Potassium 4.7 3.5 - 5.1 mmol/L    Chloride 107 97 - 108 mmol/L    CO2 25 21 - 32 mmol/L    Anion gap 8 5 - 15 mmol/L    Glucose 91 65 - 100 mg/dL    BUN 26 (H) 6 - 20 mg/dL    Creatinine 2.22 (H) 0.55 - 1.02 mg/dL    BUN/Creatinine ratio 12 12 - 20      GFR est AA 26 (L) >60 ml/min/1.73m2    GFR est non-AA 22 (L) >60 ml/min/1.73m2    Calcium 9.7 8.5 - 10.1 mg/dL    Bilirubin, total 0.5 0.2 - 1.0 mg/dL    AST (SGOT) 14 (L) 15 - 37 U/L    ALT (SGPT) 15 12 - 78 U/L    Alk.  phosphatase 81 45 - 117 U/L Protein, total 8.2 6.4 - 8.2 g/dL    Albumin 3.5 3.5 - 5.0 g/dL    Globulin 4.7 (H) 2.0 - 4.0 g/dL    A-G Ratio 0.7 (L) 1.1 - 2.2         Radiologic Studies -   CT HEAD WO CONT   Final Result   No findings of acute intracranial abnormality. CT Results  (Last 48 hours)                 05/07/21 1218  CT HEAD WO CONT Final result    Impression:  No findings of acute intracranial abnormality. Narrative:  Exam: CT Head without contrast       TECHNIQUE: Multiple transaxial CT images of the head were obtained without   contrast. Coronal and sagittal reformatted images were provided. Dose reduction: All CT scans at this facility are performed using dose reduction   optimization techniques as appropriate to a performed exam including the   following: Automated exposure control, adjustments of the mA and/or kV according   to patient size, or use of iterative reconstruction technique. HISTORY: headache       COMPARISON: Head CT 9/27/2018, 10/4/2011       FINDINGS:       Somewhat suboptimal evaluation of in the nonstandard reconstruction planes   provided by the technologist. Below findings are within exam limitations. No   significant midline shift or mass effect. The ventricles and other extra-axial   CSF spaces are symmetric and age-appropriate. Gray-white matter differentiation   appears preserved. No findings of acute intracranial hemorrhage. Basilar   cisterns appear preserved. Intracranial atherosclerosis. Mastoid air cells appear clear. Debris within the left external auditory canal   is most likely cerumen. Visualized paranasal sinuses appear well aerated. Divergent gaze with otherwise unremarkable appearance of the globes and orbits. Calvarium appears intact without findings of acute or aggressive abnormality.                  CXR Results  (Last 48 hours)      None               If you feel that you have not received excellent quality care or timely care, please ask to speak to the nurse manager. Please choose us in the future for your continued health care needs. ------------------------------------------------------------------------------------------------------------  The exam and treatment you received in the Emergency Department were for an urgent problem and are not intended as complete care. It is important that you follow-up with a doctor, nurse practitioner, or physician assistant to:  (1) confirm your diagnosis,  (2) re-evaluation of changes in your illness and treatment, and  (3) for ongoing care. If your symptoms become worse or you do not improve as expected and you are unable to reach your usual health care provider, you should return to the Emergency Department. We are available 24 hours a day. Please take your discharge instructions with you when you go to your follow-up appointment. If you have any problem arranging a follow-up appointment, contact the Emergency Department immediately. If a prescription has been provided, please have it filled as soon as possible to prevent a delay in treatment. Read the entire medication instruction sheet provided to you by the pharmacy. If you have any questions or reservations about taking the medication due to side effects or interactions with other medications, please call your primary care physician or contact the ER to speak with the charge nurse. Make an appointment with your family doctor or the physician you were referred to for follow-up of this visit as instructed on your discharge paperwork, as this is a mandatory follow-up. Return to the ER if you are unable to be seen or if you are unable to be seen in a timely manner. If you have any problem arranging the follow-up visit, contact the Emergency Department immediately.

## 2021-05-07 NOTE — ED PROVIDER NOTES
EMERGENCY DEPARTMENT HISTORY AND PHYSICAL EXAM      Date: 5/7/2021  Patient Name: Emili Turcios    History of Presenting Illness     Chief Complaint   Patient presents with    Headache       History Provided By: Patient    HPI: Emili Turcios, 70 y.o. female presents to the ED with cc of   Chief Complaint   Patient presents with    Headache   [facility] pt stated that she has been having a terrible HA since Sunday; went to [facility] yesterday was given sumatriptan 50 mg and was told that if XAVIER persists to come to ED for further evaluation; c/o nausea, dizziness, and blurred vision  With history of migraines in the past, denies any fever chest pain shortness of breath or any back pain      There are no other complaints, changes, or physical findings at this time. PCP: Trent Bruno MD    No current facility-administered medications on file prior to encounter. Current Outpatient Medications on File Prior to Encounter   Medication Sig Dispense Refill    escitalopram oxalate (LEXAPRO) 10 mg tablet Take 1 Tab by mouth daily. 30 Tab 4    DULoxetine (CYMBALTA) 30 mg capsule Take 1 Cap by mouth two (2) times a day. 60 Cap 4    spironolactone-hydrochlorothiazide (ALDACTAZIDE) 25-25 mg per tablet TAKE 1 TABLET BY MOUTH EVERY DAY IN THE MORNING 30 Tab 5    amLODIPine (NORVASC) 2.5 mg tablet TAKE 1 TABLET BY MOUTH EVERY DAY 30 Tab 5    pantoprazole (PROTONIX) 40 mg tablet TAKE 1 TABLET BY MOUTH TWICE A DAY BEFORE BREAKFAST AND DINNER 30 Tab 5    albuterol (PROVENTIL VENTOLIN) 2.5 mg /3 mL (0.083 %) nebu USE 1 VIAL IN NEBULIZER EVERY 4-6 HOURS AS NEEDED 375 mL 11    ProAir HFA 90 mcg/actuation inhaler INHALE 2 PUFFS BY MOUTH EVERY 4 HOURS AS NEEDED FOR WHEEZE 25.5 Inhaler 5    famotidine (PEPCID) 40 mg tablet Take 1 Tab by mouth daily. 30 Tab 2    aspirin delayed-release 81 mg tablet Take 1 Tab by mouth daily.       fluticasone propionate (FLONASE) 50 mcg/actuation nasal spray 2 Sprays by Both Nostrils route daily. 1 Bottle 5    diclofenac (VOLTAREN) 1 % gel Apply 2 g to affected area two (2) times a day.  latanoprost (XALATAN) 0.005 % ophthalmic solution Administer 1 Drop to both eyes daily.  cyanocobalamin (VITAMIN B12) 500 mcg tablet Take 1 Tab by mouth daily.  cholecalciferol (VITAMIN D3) (1000 Units /25 mcg) tablet Take 1 Tab by mouth daily.  calcium-cholecalciferol, D3, (CALTRATE 600+D) tablet Take 1 Tab by mouth daily.          Past History     Past Medical History:  Past Medical History:   Diagnosis Date    Anemia     Asthma 8/21/2020    Bipolar 1 disorder ([facility] [facility] 8/21/2020    Chronic hepatitis ([facility] [facility] 8/21/2020    Chronic kidney disease (CKD), stage II (mild) 8/21/2020    Cirrhosis of liver ([facility] [address] [facility] 8/21/2020    COPD (chronic obstructive pulmonary disease) ([facility] [address] [facility] 8/21/2020    Depressive disorder 8/21/2020    Edema 8/21/2020    History of mood disorder 8/21/2020    Hypertension     Lumbar radiculopathy 8/21/2020    Morbid obesity ([facility] [facility] 8/21/2020    Osteoarthritis 8/21/2020    Pain, knee 8/21/2020    Pure hypercholesterolemia with target low density lipoprotein (LDL) cholesterol less than 130 mg/dL 8/21/2020    RUQ abdominal pain 8/21/2020    Sleep apnea 8/21/2020    Vitamin D deficiency 8/21/2020       Past Surgical History:  Past Surgical History:   Procedure Laterality Date    HX COLONOSCOPY      HX COLONOSCOPY  1988    HX CYST INCISION AND DRAINAGE  1980    HX GYN      HX TOTAL ABDOMINAL HYSTERECTOMY  1976    IR BX LIVER NEEDLE W OTHER [address]  2014       Family History:  Family History   Problem Relation Age of Onset    Hypertension Mother     Hypertension Brother     Heart Disease Brother     Hypertension Maternal Grandmother        Social History:  Social History     Tobacco Use    Smoking status: Former Smoker     Packs/day: 1.00    Smokeless tobacco: Never Used    Tobacco comment: QUIT 2014   Substance Use Topics    Alcohol use: Never     Frequency: Never Binge frequency: Never    Drug use: Never       Allergies:  No Known Allergies      Review of Systems   Review of Systems   Constitutional: Negative. Negative for chills and fever. HENT: Negative for congestion, facial swelling, rhinorrhea and sore throat. Eyes: Negative. Negative for photophobia and pain. Respiratory: Negative for cough, shortness of breath and wheezing. Cardiovascular: Negative. Negative for chest pain. Gastrointestinal: Negative for abdominal distention and abdominal pain. Genitourinary: Negative. Musculoskeletal: Negative. Allergic/Immunologic: Negative for immunocompromised state. Neurological: Negative. Negative for syncope and weakness. Hematological: Negative. Psychiatric/Behavioral: Negative. Physical Exam   Physical Exam  Vitals signs and nursing note reviewed. Constitutional:       General: She is in acute distress (Mild). Appearance: Normal appearance. She is obese. HENT:      Head: Normocephalic and atraumatic. Nose: No congestion or rhinorrhea. Eyes:      Extraocular Movements: Extraocular movements intact. Pupils: Pupils are equal, round, and reactive to light. Neck:      Musculoskeletal: Normal range of motion and neck supple. Cardiovascular:      Rate and Rhythm: Normal rate and regular rhythm. Pulmonary:      Effort: Pulmonary effort is normal.      Breath sounds: Normal breath sounds. Abdominal:      General: Abdomen is flat. Bowel sounds are normal. There is no distension. Tenderness: There is no abdominal tenderness. There is no guarding. Musculoskeletal: Normal range of motion. Skin:     General: Skin is warm and dry. Neurological:      General: No focal deficit present. Mental Status: She is alert and oriented to person, place, and time.    Psychiatric:         Mood and Affect: Mood normal.         Diagnostic Study Results     Labs -     Recent Results (from the past 12 hour(s))   CBC WITH AUTOMATED DIFF    Collection Time: 05/07/21 12:00 PM   Result Value Ref Range    WBC 4.5 3.6 - 11.0 K/uL    RBC 4.69 3.80 - 5.20 M/uL    HGB 10.8 (L) 11.5 - 16.0 g/dL    HCT 35.2 35.0 - 47.0 %    MCV 75.1 (L) 80.0 - 99.0 FL    MCH 23.0 (L) 26.0 - 34.0 PG    MCHC 30.7 30.0 - 36.5 g/dL    RDW 15.1 (H) 11.5 - 14.5 %    PLATELET 464 352 - 733 K/uL    MPV 10.1 8.9 - 12.9 FL    NRBC 0.0 0.0  WBC    ABSOLUTE NRBC 0.00 0.00 - 0.01 K/uL    NEUTROPHILS 63 32 - 75 %    LYMPHOCYTES 25 12 - 49 %    MONOCYTES 8 5 - 13 %    EOSINOPHILS 4 0 - 7 %    BASOPHILS 0 0 - 1 %    IMMATURE GRANULOCYTES 0 0 - 0.5 %    ABS. NEUTROPHILS 2.8 1.8 - 8.0 K/UL    ABS. LYMPHOCYTES 1.2 0.8 - 3.5 K/UL    ABS. MONOCYTES 0.4 0.0 - 1.0 K/UL    ABS. EOSINOPHILS 0.2 0.0 - 0.4 K/UL    ABS. BASOPHILS 0.0 0.0 - 0.1 K/UL    ABS. IMM. GRANS. 0.0 0.00 - 0.04 K/UL    DF AUTOMATED     METABOLIC PANEL, COMPREHENSIVE    Collection Time: 05/07/21 12:00 PM   Result Value Ref Range    Sodium 140 136 - 145 mmol/L    Potassium 4.7 3.5 - 5.1 mmol/L    Chloride 107 97 - 108 mmol/L    CO2 25 21 - 32 mmol/L    Anion gap 8 5 - 15 mmol/L    Glucose 91 65 - 100 mg/dL    BUN 26 (H) 6 - 20 mg/dL    Creatinine 2.22 (H) 0.55 - 1.02 mg/dL    BUN/Creatinine ratio 12 12 - 20      GFR est AA 26 (L) >60 ml/min/1.73m2    GFR est non-AA 22 (L) >60 ml/min/1.73m2    Calcium 9.7 8.5 - 10.1 mg/dL    Bilirubin, total 0.5 0.2 - 1.0 mg/dL    AST (SGOT) 14 (L) 15 - 37 U/L    ALT (SGPT) 15 12 - 78 U/L    Alk. phosphatase 81 45 - 117 U/L    Protein, total 8.2 6.4 - 8.2 g/dL    Albumin 3.5 3.5 - 5.0 g/dL    Globulin 4.7 (H) 2.0 - 4.0 g/dL    A-G Ratio 0.7 (L) 1.1 - 2.2         Labs reviewed by me    Radiologic Studies -   CT HEAD WO CONT   Final Result   No findings of acute intracranial abnormality. CT Results  (Last 48 hours)               05/07/21 1218  CT HEAD WO CONT Final result    Impression:  No findings of acute intracranial abnormality.         Narrative:  Exam: CT Head without contrast       TECHNIQUE: Multiple transaxial CT images of the head were obtained without   contrast. Coronal and sagittal reformatted images were provided. Dose reduction: All CT scans at this facility are performed using dose reduction   optimization techniques as appropriate to a performed exam including the   following: Automated exposure control, adjustments of the mA and/or kV according   to patient size, or use of iterative reconstruction technique. HISTORY: headache       COMPARISON: Head CT 9/27/2018, 10/4/2011       FINDINGS:       Somewhat suboptimal evaluation of in the nonstandard reconstruction planes   provided by the technologist. Below findings are within exam limitations. No   significant midline shift or mass effect. The ventricles and other extra-axial   CSF spaces are symmetric and age-appropriate. Gray-white matter differentiation   appears preserved. No findings of acute intracranial hemorrhage. Basilar   cisterns appear preserved. Intracranial atherosclerosis. Mastoid air cells appear clear. Debris within the left external auditory canal   is most likely cerumen. Visualized paranasal sinuses appear well aerated. Divergent gaze with otherwise unremarkable appearance of the globes and orbits. Calvarium appears intact without findings of acute or aggressive abnormality. CXR Results  (Last 48 hours)    None            Medical Decision Making     I am the first provider for this patient. I reviewed the vital signs, available nursing notes, past medical history, past surgical history, family history and social history. RADIOLOGY report and LABS reviewed by me    Vital Signs-Reviewed the patient's vital signs. Patient Vitals for the past 12 hrs:   Temp Pulse Resp BP SpO2   05/07/21 1134 98.4 °F (36.9 °C) 72 16 129/80 99 %       EKG interpretation: (Preliminary)      Records Reviewed: Nurse's note.       Provider Notes (Medical Decision Making):    Patient presents with DIFF DX : Migraine headache          ED Course:   Initial assessment performed. The patients presenting problems have been discussed, and they are in agreement with the care plan formulated and outlined with them. I have encouraged them to ask questions as they arise throughout their visit. TREATMENT RESPONSE -Stable          Susanne Hernandez MD      Disposition:  Discharged   Diagnostic tests were reviewed and questions answered. Diagnosis, care plan and treatment options were discussed. The patient understand instructions and will follow up as directed. Condition stable    Admitting Provider:  No admitting provider for patient encounter. Consulting Provider:  No ref. provider found       DISCHARGE PLAN:  1. Current Discharge Medication List      START taking these medications    Details   SUMAtriptan (Imitrex) 50 mg tablet Take 1 Tab by mouth once as needed for Headache or Migraine for up to 1 dose. Qty: 9 Tab, Refills: 0  Start date: 5/7/2021, End date: 5/7/2021           2. Follow-up Information     Follow up With Specialties Details Why Contact Info    Mikie Alamo MD Neurology Schedule an appointment as soon as possible for a visit in 2 days  [address] [place] [address] [address] [place] [address]  [phone]          3. Return to ED if worse     Diagnosis     Clinical Impression:     ICD-10-CM ICD-9-CM    1. Intractable migraine with status migrainosus, unspecified migraine type  G43.911 346.93    2. Chronic kidney disease, unspecified CKD stage  N18.9 585. 9         Attestations:    Susanne Hernandez MD    Please note that this dictation was completed with [employer], the computer voice recognition software. Quite often unanticipated grammatical, syntax, homophones, and other interpretive errors are inadvertently transcribed by the computer software. Please disregard these errors. Please excuse any errors that have escaped final proofreading.   Thank you.

## 2021-05-16 PROBLEM — R51.9 HEADACHE DISORDER: Status: ACTIVE | Noted: 2021-05-16

## 2021-05-16 PROBLEM — F31.9 BIPOLAR 1 DISORDER (HCC): Status: RESOLVED | Noted: 2020-08-21 | Resolved: 2021-05-16

## 2021-05-16 PROBLEM — R79.89 INCREASE IN SERUM CREATININE FROM PRIOR MEASUREMENT: Status: ACTIVE | Noted: 2021-05-16

## 2021-05-19 ENCOUNTER — OFFICE VISIT (OUTPATIENT)
Dept: INTERNAL MEDICINE CLINIC | Age: 76
End: 2021-05-19
Payer: MEDICAID

## 2021-05-19 VITALS
BODY MASS INDEX: 52.73 KG/M2 | SYSTOLIC BLOOD PRESSURE: 120 MMHG | DIASTOLIC BLOOD PRESSURE: 74 MMHG | OXYGEN SATURATION: 97 % | HEART RATE: 72 BPM | HEIGHT: 60 IN | RESPIRATION RATE: 12 BRPM | WEIGHT: 268.6 LBS

## 2021-05-19 DIAGNOSIS — K74.69 OTHER CIRRHOSIS OF LIVER (HCC): ICD-10-CM

## 2021-05-19 DIAGNOSIS — I25.2 HISTORY OF HEART ATTACK: ICD-10-CM

## 2021-05-19 DIAGNOSIS — R51.9 HEADACHE DISORDER: ICD-10-CM

## 2021-05-19 DIAGNOSIS — Z87.19 HISTORY OF ESOPHAGEAL VARICES: ICD-10-CM

## 2021-05-19 DIAGNOSIS — F43.21 SITUATIONAL DEPRESSION: ICD-10-CM

## 2021-05-19 DIAGNOSIS — G89.29 CHRONIC BILATERAL LOW BACK PAIN, UNSPECIFIED WHETHER SCIATICA PRESENT: ICD-10-CM

## 2021-05-19 DIAGNOSIS — M54.50 CHRONIC BILATERAL LOW BACK PAIN, UNSPECIFIED WHETHER SCIATICA PRESENT: ICD-10-CM

## 2021-05-19 DIAGNOSIS — I10 ESSENTIAL HYPERTENSION: ICD-10-CM

## 2021-05-19 DIAGNOSIS — G47.30 SLEEP APNEA, UNSPECIFIED TYPE: ICD-10-CM

## 2021-05-19 DIAGNOSIS — Z86.59 HISTORY OF MOOD DISORDER: ICD-10-CM

## 2021-05-19 DIAGNOSIS — K74.60 LIVER DISEASE, CHRONIC, WITH CIRRHOSIS (HCC): ICD-10-CM

## 2021-05-19 DIAGNOSIS — N18.4 STAGE 4 CHRONIC KIDNEY DISEASE (HCC): ICD-10-CM

## 2021-05-19 DIAGNOSIS — K76.9 LIVER DISEASE, CHRONIC, WITH CIRRHOSIS (HCC): ICD-10-CM

## 2021-05-19 DIAGNOSIS — Z86.19 HISTORY OF HEPATITIS C: ICD-10-CM

## 2021-05-19 DIAGNOSIS — R79.89 INCREASE IN SERUM CREATININE FROM PRIOR MEASUREMENT: ICD-10-CM

## 2021-05-19 PROBLEM — N18.32 STAGE 3B CHRONIC KIDNEY DISEASE (HCC): Status: ACTIVE | Noted: 2021-05-19

## 2021-05-19 PROCEDURE — 99214 OFFICE O/P EST MOD 30 MIN: CPT | Performed by: INTERNAL MEDICINE

## 2021-05-19 RX ORDER — SUMATRIPTAN 50 MG/1
50 TABLET, FILM COATED ORAL
COMMUNITY
End: 2021-05-19 | Stop reason: SDUPTHER

## 2021-05-19 RX ORDER — AMLODIPINE BESYLATE 5 MG/1
5 TABLET ORAL DAILY
Qty: 30 TABLET | Refills: 2 | Status: SHIPPED | OUTPATIENT
Start: 2021-05-19 | End: 2021-12-02 | Stop reason: SDDI

## 2021-05-19 RX ORDER — SUMATRIPTAN 50 MG/1
50 TABLET, FILM COATED ORAL
Qty: 10 TABLET | Refills: 1 | Status: SHIPPED | OUTPATIENT
Start: 2021-05-19 | End: 2021-05-19

## 2021-05-19 NOTE — PROGRESS NOTES
Rodrigue Sam is a 70 y.o. female and presents with Other (ER f/u for headache )    Ms. Fer Baron came for follow-up after recent ER visit having headache she has been started on sumatriptan,By  ER Dr. Lise Gold, and has been referred to Dr. Lise Gold but she does not know the name of neurologist I reviewed her labs and she has acute surge in creatinine, that she is not aware, I checked her medicines in her bag and she is taking hydrochlorothiazide 25 mg/day which was stopped and taking spironolactone HCTZ 25/25 mg/day that may be one of the etiology I stopped all her medicines and just started on amlodipine 5 mg/day to recover the renal function if it is the etiology and if it is different etiology I will refer to her nephrologist labs ordered again to monitor her renal function and CBC , she has been treated for hepatitis C. I reviewed her notes from [facility] visit with hepatologist at [facility] was on 18 June 2020 and she had chronic hepatitis C genotype 1 AA which was diagnosed in 2011 and she underwent 12 weeks course of Harvoni and ribavirin also she had history of heavy alcohol abuse in the past stopped in February 2014 and in March 2014 she had a liver cirrhosis for liver biopsy and grade 1 esophageal varices. She had repeat EGD in 2018 August showing 2 cm hiatal hernia and diminutive esophageal varices and it was done in 2016 also. She underwent colonoscopy in 2016 which was showing nonthrombosed external hemorrhoids and 3 mm polyp in descending colon and diverticulosis in the entire colon and external and internal hemorrhoids. She had taken pneumonia vaccine 23 in 2013 and PCV in 2019. She was ordered labs but somehow it seems like she has stopped seeing hepatologist and reminded her and she has to make appointment she keeps saying she has to make appointment.   She underwent ultrasound of the liver in February 2020 and there were normal hepatic lesions and recommended to have follow-up in August but she did not go and she was ordered for AFP patient was recommended to not to use NSAIDs and herbal supplements and limit the Tylenol less than 2 g per 24-hour and hepatitis C antibody will remain positive in her blood forever and she had Prevnar 13 in August 2019 and Pneumovax 23 in 2013 she was recommended to have 6-month follow-up appointment that she has not done. No headache or dizziness she needs refill for sumatriptan she needs referral to nephrologist and neurologist.  Her BUN/creatinine was 26/2.2. Review of Systems    Review of Systems   Constitutional: Negative. HENT: Negative for congestion and sore throat. Eyes: Negative for blurred vision. Respiratory: Negative for cough, sputum production and wheezing. Cardiovascular: Negative. Gastrointestinal: Negative. History of liver cirrhosis and  treated for hepatitis C   Genitourinary: Negative for dysuria, flank pain and hematuria. Musculoskeletal: Negative for back pain. History of osteoarthritis in back   Neurological: Positive for headaches. Negative for dizziness, sensory change, speech change and focal weakness. Today no headache   Psychiatric/Behavioral: Negative for depression, hallucinations and memory loss. The patient is not nervous/anxious and does not have insomnia.          Past Medical History:   Diagnosis Date    Anemia     Asthma 8/21/2020    Bipolar 1 disorder ([address] 8/21/2020    Chronic hepatitis ([address] 8/21/2020    Chronic kidney disease (CKD), stage II (mild) 8/21/2020    Cirrhosis of liver ([address] 8/21/2020    COPD (chronic obstructive pulmonary disease) ([address] 8/21/2020    Depressive disorder 8/21/2020    Edema 8/21/2020    Headache disorder 5/16/2021    History of mood disorder 8/21/2020    Hypertension     Lumbar radiculopathy 8/21/2020    Morbid obesity ([address] 8/21/2020    Osteoarthritis 8/21/2020    Pain, knee 8/21/2020    Pure hypercholesterolemia with target low density lipoprotein (LDL) cholesterol less than 130 mg/dL 2020    RUQ abdominal pain 2020    Sleep apnea 2020    Vitamin D deficiency 2020     Past Surgical History:   Procedure Laterality Date    HX COLONOSCOPY      HX COLONOSCOPY      HX CYST INCISION AND DRAINAGE      HX GYN      HX TOTAL ABDOMINAL HYSTERECTOMY  1976    IR BX LIVER NEEDLE W OTHER [address]       Social History     Socioeconomic History    Marital status:      Spouse name: Not on file    Number of children: Not on file    Years of education: Not on file    Highest education level: Not on file   Tobacco Use    Smoking status: Former Smoker     Packs/day: 1.00     Years: 30.00     Pack years: 30.00     Quit date:      Years since quittin.3    Smokeless tobacco: Never Used   Vaping Use    Vaping Use: Never used   Substance and Sexual Activity    Alcohol use: Never    Drug use: Never    Sexual activity: Not Currently     Social Determinants of Health     Financial Resource Strain:     Difficulty of Paying Living Expenses:    Food Insecurity:     Worried About Running Out of Food in the Last Year:     Ran Out of Food in the Last Year:    Transportation Needs:     Lack of Transportation (Medical):      Lack of Transportation (Non-Medical):    Physical Activity:     Days of Exercise per Week:     Minutes of Exercise per Session:    Stress:     Feeling of Stress :    Social Connections:     Frequency of Communication with Friends and Family:     Frequency of Social Gatherings with Friends and Family:     Attends Adventism Services:     Active Member of Clubs or Organizations:     Attends Club or Organization Meetings:     Marital Status:      Family History   Problem Relation Age of Onset    Hypertension Mother     Hypertension Brother     Heart Disease Brother     Hypertension Maternal Grandmother      Current Outpatient Medications   Medication Sig Dispense Refill    amLODIPine (NORVASC) 5 mg tablet Take 1 Tablet by mouth daily. Stop hctz and spironolactone hctz 30 Tablet 2    SUMAtriptan (IMITREX) 50 mg tablet Take 1 Tablet by mouth once as needed for Migraine for up to 1 dose. 10 Tablet 1    famotidine (PEPCID) 40 mg tablet TAKE 1 TABLET BY MOUTH EVERY DAY 30 Tab 5    escitalopram oxalate (LEXAPRO) 10 mg tablet Take 1 Tab by mouth daily. 30 Tab 4    DULoxetine (CYMBALTA) 30 mg capsule Take 1 Cap by mouth two (2) times a day. 60 Cap 4    pantoprazole (PROTONIX) 40 mg tablet TAKE 1 TABLET BY MOUTH TWICE A DAY BEFORE BREAKFAST AND DINNER 30 Tab 5    albuterol (PROVENTIL VENTOLIN) 2.5 mg /3 mL (0.083 %) nebu USE 1 VIAL IN NEBULIZER EVERY 4-6 HOURS AS NEEDED 375 mL 11    ProAir HFA 90 mcg/actuation inhaler INHALE 2 PUFFS BY MOUTH EVERY 4 HOURS AS NEEDED FOR WHEEZE 25.5 Inhaler 5    aspirin delayed-release 81 mg tablet Take 1 Tab by mouth daily.  fluticasone propionate (FLONASE) 50 mcg/actuation nasal spray 2 Sprays by Both Nostrils route daily. 1 Bottle 5    diclofenac (VOLTAREN) 1 % gel Apply 2 g to affected area two (2) times a day.  latanoprost (XALATAN) 0.005 % ophthalmic solution Administer 1 Drop to both eyes daily.  cyanocobalamin (VITAMIN B12) 500 mcg tablet Take 1 Tab by mouth daily.  cholecalciferol (VITAMIN D3) (1000 Units /25 mcg) tablet Take 1 Tab by mouth daily.  calcium-cholecalciferol, D3, (CALTRATE 600+D) tablet Take 1 Tab by mouth daily. No Known Allergies    Objective:  Visit Vitals  /74 (BP 1 Location: Right upper arm, BP Patient Position: Sitting, BP Cuff Size: Large adult)   Pulse 72   Resp 12   Ht 5' (1.524 m)   Wt 268 lb 9.6 oz (121.8 kg)   SpO2 97%   BMI 52.46 kg/m²       Physical Exam:   Constitutional: General Appearance: Pleasant. Level of Distress: NAD. Psychiatric: Mental Status: normal mood and affect Orientation: to time, place, and person. ,normal eye contact. Poor insight/  Head: Head: normocephalic and atraumatic.    Eyes: Pupils: PERRLA. Sclerae: non-icteric. Neck: Neck: supple, trachea midline, and no masses. Lymph Nodes: no cervical LAD. Thyroid: no enlargement or nodules and non-tender. Lungs: Respiratory effort: no dyspnea. Auscultation: no wheezing, rales/crackles, or rhonchi and breath sounds normal and good air movement. Cardiovascular: Apical Impulse: not displaced. Heart Auscultation: normal S1 and S2; no murmurs, rubs, or gallops; and RRR. Neck vessels: no carotid bruits. Pulses including femoral / pedal: normal throughout. Abdomen: Bowel Sounds: normal. Inspection and Palpation: no tenderness, guarding, or masses and soft and non-distended. Liver: non-tender and no hepatomegaly. Spleen: non-tender and no splenomegaly. Musculoskeletal[de-identified] Extremities: no edema,no varicosities. No Calf tenderness. Neurologic: Gait and Station: normal gait and station. Motor Strength normal right and left. Skin: Inspection and palpation: no rash, lesions, or ulcer. Results for orders placed or performed during the hospital encounter of 05/07/21   CBC WITH AUTOMATED DIFF   Result Value Ref Range    WBC 4.5 3.6 - 11.0 K/uL    RBC 4.69 3.80 - 5.20 M/uL    HGB 10.8 (L) 11.5 - 16.0 g/dL    HCT 35.2 35.0 - 47.0 %    MCV 75.1 (L) 80.0 - 99.0 FL    MCH 23.0 (L) 26.0 - 34.0 PG    MCHC 30.7 30.0 - 36.5 g/dL    RDW 15.1 (H) 11.5 - 14.5 %    PLATELET 095 887 - 513 K/uL    MPV 10.1 8.9 - 12.9 FL    NRBC 0.0 0.0  WBC    ABSOLUTE NRBC 0.00 0.00 - 0.01 K/uL    NEUTROPHILS 63 32 - 75 %    LYMPHOCYTES 25 12 - 49 %    MONOCYTES 8 5 - 13 %    EOSINOPHILS 4 0 - 7 %    BASOPHILS 0 0 - 1 %    IMMATURE GRANULOCYTES 0 0 - 0.5 %    ABS. NEUTROPHILS 2.8 1.8 - 8.0 K/UL    ABS. LYMPHOCYTES 1.2 0.8 - 3.5 K/UL    ABS. MONOCYTES 0.4 0.0 - 1.0 K/UL    ABS. EOSINOPHILS 0.2 0.0 - 0.4 K/UL    ABS. BASOPHILS 0.0 0.0 - 0.1 K/UL    ABS. IMM.  GRANS. 0.0 0.00 - 0.04 K/UL    DF AUTOMATED     METABOLIC PANEL, COMPREHENSIVE   Result Value Ref Range    Sodium 140 136 - 145 mmol/L    Potassium 4.7 3.5 - 5.1 mmol/L    Chloride 107 97 - 108 mmol/L    CO2 25 21 - 32 mmol/L    Anion gap 8 5 - 15 mmol/L    Glucose 91 65 - 100 mg/dL    BUN 26 (H) 6 - 20 mg/dL    Creatinine 2.22 (H) 0.55 - 1.02 mg/dL    BUN/Creatinine ratio 12 12 - 20      GFR est AA 26 (L) >60 ml/min/1.73m2    GFR est non-AA 22 (L) >60 ml/min/1.73m2    Calcium 9.7 8.5 - 10.1 mg/dL    Bilirubin, total 0.5 0.2 - 1.0 mg/dL    AST (SGOT) 14 (L) 15 - 37 U/L    ALT (SGPT) 15 12 - 78 U/L    Alk. phosphatase 81 45 - 117 U/L    Protein, total 8.2 6.4 - 8.2 g/dL    Albumin 3.5 3.5 - 5.0 g/dL    Globulin 4.7 (H) 2.0 - 4.0 g/dL    A-G Ratio 0.7 (L) 1.1 - 2.2         Assessment/Plan:      ICD-10-CM ICD-9-CM    1. Headache disorder  R51.9 784.0 REFERRAL TO NEUROLOGY   2. Increase in serum creatinine from prior measurement  R79.89 790.99 CBC WITH AUTOMATED DIFF      METABOLIC PANEL, BASIC      REFERRAL TO NEPHROLOGY      REFERRAL TO NEPHROLOGY   3. Liver disease, chronic, with cirrhosis (HCC)  K74.60 571.9 REFERRAL TO NEPHROLOGY    K76.9 571.5 REFERRAL TO NEPHROLOGY   4. Stage 4 chronic kidney disease (HCC)  N18.4 585.4 CBC WITH AUTOMATED DIFF      METABOLIC PANEL, BASIC      REFERRAL TO NEPHROLOGY      REFERRAL TO NEUROLOGY      REFERRAL TO NEPHROLOGY   5. Essential hypertension  I10 401.9 CBC WITH AUTOMATED DIFF      METABOLIC PANEL, BASIC      REFERRAL TO NEPHROLOGY      REFERRAL TO NEPHROLOGY   6. Situational depression  F43.21 309.0 REFERRAL TO NEUROLOGY   7. Chronic bilateral low back pain, unspecified whether sciatica present  M54.5 724.2 CBC WITH AUTOMATED DIFF    F23.37 361.87 METABOLIC PANEL, BASIC   8. History of heart attack  I25.2 412 REFERRAL TO NEPHROLOGY   9. Sleep apnea, unspecified type  G47.30 780.57    10. History of mood disorder  Z86.59 V11.1 REFERRAL TO NEUROLOGY   11. Other cirrhosis of liver (HCC)  K74.69 571.5    12. History of hepatitis C  Z86.19 V12.09    13.  History of esophageal varices  Z87.19 V12.79 Orders Placed This Encounter    CBC WITH AUTOMATED DIFF    METABOLIC PANEL, BASIC    REFERRAL TO NEPHROLOGY     Referral Priority:   Routine     Referral Type:   Consultation     Referral Reason:   Specialty Services Required     Number of Visits Requested:   1    REFERRAL TO NEUROLOGY     Referral Priority:   Routine     Referral Type:   Consultation     Referral Reason:   Specialty Services Required     Referred to Provider:   Shay Dorsey MD     Number of Visits Requested:   1    REFERRAL TO NEPHROLOGY     Referral Priority:   Routine     Referral Type:   Consultation     Referral Reason:   Specialty Services Required     Referred to Provider:   Prince Hua MD     Number of Visits Requested:   1    DISCONTD: SUMAtriptan (IMITREX) 50 mg tablet     Sig: Take 50 mg by mouth once as needed for Migraine.  amLODIPine (NORVASC) 5 mg tablet     Sig: Take 1 Tablet by mouth daily. Stop hctz and spironolactone hctz     Dispense:  30 Tablet     Refill:  2    SUMAtriptan (IMITREX) 50 mg tablet     Sig: Take 1 Tablet by mouth once as needed for Migraine for up to 1 dose. Dispense:  10 Tablet     Refill:  1       Hypertension controlled started on amlodipine 5 mg/day I stopped her HCTZ and spironolactone HCTZ, diet low in sodium. Acute rising creatinine with CKD stage III-IV repeat labs ordered to monitor her labs avoid nephrotoxic medicines do not take any OTC NSAIDs she is not given any NSAIDs by me,, and repeat CBC CMP avoid dehydration, taken her off from HCTZ and spironolactone. History of liver cirrhosis and history of hepatitis C she was given 12 weeks of  antiviral medicine Roberto had seen hepatologist at [facility] last time in June 2020 did not do follow-up,. Also had mild esophageal varices underwent EGD and colonoscopy. Up to date for flu vaccine and pneumonia vaccine.     Headache she has been started on sumatriptan from ER and recommended to see neurologist  Deasonia Washington and I referred her because she does not know the name. No discharge summary available but I reviewed in epic system. Blood pressure is well controlled. Morbid obesity with BMI 52.4 heart healthy diet. Mild depression with history of anxiety and mood disorder continue duloxetine 30 mg twice a day and also takes Lexapro 10 mg/day and not having any negative thoughts. History of alcohol abuse until 2014 no more drinking. No history of substance abuse. GERD continued on pantoprazole. Follow-up in  6 weeks, labs ordered. Referral made  education and counseling given. lose weight, follow low fat diet, follow low salt diet, continue present plan, call if any problems    There are no Patient Instructions on file for this visit. Follow-up and Dispositions    · Return in about 6 weeks (around 6/30/2021) for ckd ,with creatinine ,ref kidney dr and neuro.

## 2021-05-19 NOTE — PROGRESS NOTES
Chief Complaint   Patient presents with    Other     ER f/u for headache          1. Have you been to the ER, urgent care clinic since your last visit? Hospitalized since your last visit? [facility] 05/05/2021 headache [facility] ER 05/07/2021 headache     2. Have you seen or consulted any other health care providers outside of the [address] [place] [address] since your last visit? Include any pap smears or colon screening.  No       Visit Vitals  /78 (BP 1 Location: Right upper arm, BP Patient Position: Sitting, BP Cuff Size: Adult)   Pulse 89   Resp 12   Ht 5' (1.524 m)   Wt 268 lb 9.6 oz (121.8 kg)   SpO2 97%   BMI 52.46 kg/m²

## 2021-05-20 LAB
BASOPHILS # BLD AUTO: 0 X10E3/UL (ref 0–0.2)
BASOPHILS NFR BLD AUTO: 1 %
BUN SERPL-MCNC: 19 MG/DL (ref 8–27)
BUN/CREAT SERPL: 11 (ref 12–28)
CALCIUM SERPL-MCNC: 10.2 MG/DL (ref 8.7–10.3)
CHLORIDE SERPL-SCNC: 101 MMOL/L (ref 96–106)
CO2 SERPL-SCNC: 23 MMOL/L (ref 20–29)
CREAT SERPL-MCNC: 1.74 MG/DL (ref 0.57–1)
EOSINOPHIL # BLD AUTO: 0.2 X10E3/UL (ref 0–0.4)
EOSINOPHIL NFR BLD AUTO: 3 %
ERYTHROCYTE [DISTWIDTH] IN BLOOD BY AUTOMATED COUNT: 14.6 % (ref 11.7–15.4)
GLUCOSE SERPL-MCNC: 88 MG/DL (ref 65–99)
HCT VFR BLD AUTO: 36.3 % (ref 34–46.6)
HGB BLD-MCNC: 11 G/DL (ref 11.1–15.9)
IMM GRANULOCYTES # BLD AUTO: 0 X10E3/UL (ref 0–0.1)
IMM GRANULOCYTES NFR BLD AUTO: 0 %
LYMPHOCYTES # BLD AUTO: 1.4 X10E3/UL (ref 0.7–3.1)
LYMPHOCYTES NFR BLD AUTO: 24 %
MCH RBC QN AUTO: 23.6 PG (ref 26.6–33)
MCHC RBC AUTO-ENTMCNC: 30.3 G/DL (ref 31.5–35.7)
MCV RBC AUTO: 78 FL (ref 79–97)
MONOCYTES # BLD AUTO: 0.4 X10E3/UL (ref 0.1–0.9)
MONOCYTES NFR BLD AUTO: 7 %
NEUTROPHILS # BLD AUTO: 3.9 X10E3/UL (ref 1.4–7)
NEUTROPHILS NFR BLD AUTO: 65 %
PLATELET # BLD AUTO: 299 X10E3/UL (ref 150–450)
POTASSIUM SERPL-SCNC: 4.6 MMOL/L (ref 3.5–5.2)
RBC # BLD AUTO: 4.67 X10E6/UL (ref 3.77–5.28)
SODIUM SERPL-SCNC: 138 MMOL/L (ref 134–144)
WBC # BLD AUTO: 6 X10E3/UL (ref 3.4–10.8)

## 2021-05-20 NOTE — PROGRESS NOTES
Please call Ms. Renetta Wheeler that her kidney function is slightly better than what it was checked during ER visit however still it is low and I have taken her off from duplication of medicine that she keep taking including hydrochlorothiazide and hydrochlorothiazide with spironolactone hopefully it should come back to better baseline I will check again CBC BMP in next 2 to 3 weeks we will repeat, she has to call the kidney specialist to look over and I have already given referral to kidney specialist sometimes she does not take any initiate use, and, she has to take it seriously to see kidney specialist and neurologist.

## 2021-06-18 RX ORDER — SUMATRIPTAN 50 MG/1
50 TABLET, FILM COATED ORAL
Qty: 10 TABLET | Refills: 0 | Status: SHIPPED | OUTPATIENT
Start: 2021-06-18 | End: 2021-06-18

## 2021-07-19 DIAGNOSIS — M54.16 LUMBAR RADICULOPATHY: ICD-10-CM

## 2021-07-19 DIAGNOSIS — N18.2 CHRONIC KIDNEY DISEASE (CKD), STAGE II (MILD): ICD-10-CM

## 2021-07-19 DIAGNOSIS — K74.69 OTHER CIRRHOSIS OF LIVER (HCC): ICD-10-CM

## 2021-07-19 DIAGNOSIS — I10 ESSENTIAL HYPERTENSION: Primary | ICD-10-CM

## 2021-07-20 LAB
BASOPHILS # BLD AUTO: 0 X10E3/UL (ref 0–0.2)
BASOPHILS NFR BLD AUTO: 1 %
BUN SERPL-MCNC: 18 MG/DL (ref 8–27)
BUN/CREAT SERPL: 12 (ref 12–28)
CALCIUM SERPL-MCNC: 10 MG/DL (ref 8.7–10.3)
CHLORIDE SERPL-SCNC: 107 MMOL/L (ref 96–106)
CO2 SERPL-SCNC: 21 MMOL/L (ref 20–29)
CREAT SERPL-MCNC: 1.54 MG/DL (ref 0.57–1)
EOSINOPHIL # BLD AUTO: 0.1 X10E3/UL (ref 0–0.4)
EOSINOPHIL NFR BLD AUTO: 3 %
ERYTHROCYTE [DISTWIDTH] IN BLOOD BY AUTOMATED COUNT: 14.1 % (ref 11.7–15.4)
GLUCOSE SERPL-MCNC: 86 MG/DL (ref 65–99)
HCT VFR BLD AUTO: 36.9 % (ref 34–46.6)
HGB BLD-MCNC: 11.4 G/DL (ref 11.1–15.9)
IMM GRANULOCYTES # BLD AUTO: 0 X10E3/UL (ref 0–0.1)
IMM GRANULOCYTES NFR BLD AUTO: 0 %
LYMPHOCYTES # BLD AUTO: 1.4 X10E3/UL (ref 0.7–3.1)
LYMPHOCYTES NFR BLD AUTO: 27 %
MCH RBC QN AUTO: 23.2 PG (ref 26.6–33)
MCHC RBC AUTO-ENTMCNC: 30.9 G/DL (ref 31.5–35.7)
MCV RBC AUTO: 75 FL (ref 79–97)
MONOCYTES # BLD AUTO: 0.4 X10E3/UL (ref 0.1–0.9)
MONOCYTES NFR BLD AUTO: 7 %
NEUTROPHILS # BLD AUTO: 3.3 X10E3/UL (ref 1.4–7)
NEUTROPHILS NFR BLD AUTO: 62 %
PLATELET # BLD AUTO: 274 X10E3/UL (ref 150–450)
POTASSIUM SERPL-SCNC: 4.7 MMOL/L (ref 3.5–5.2)
RBC # BLD AUTO: 4.92 X10E6/UL (ref 3.77–5.28)
SODIUM SERPL-SCNC: 143 MMOL/L (ref 134–144)
WBC # BLD AUTO: 5.3 X10E3/UL (ref 3.4–10.8)

## 2021-07-20 NOTE — PROGRESS NOTES
We have to refer her to nephrologist I will create the referral, please inform her that she needs kidney specialist to be seen because of her kidney function low and diminished to 38% instead of more than 60% it is slightly better than 2 months ago it was 33%, she should not take any over-the-counter ibuprofen Aleve except Tylenol,, and, drink enough water

## 2021-07-22 ENCOUNTER — TRANSCRIBE ORDER (OUTPATIENT)
Dept: SCHEDULING | Age: 76
End: 2021-07-22

## 2021-07-22 DIAGNOSIS — N18.4 CHRONIC KIDNEY DISEASE, STAGE IV (SEVERE) (HCC): Primary | ICD-10-CM

## 2021-07-29 ENCOUNTER — TRANSCRIBE ORDER (OUTPATIENT)
Dept: SCHEDULING | Age: 76
End: 2021-07-29

## 2021-07-29 DIAGNOSIS — Z12.31 SCREENING MAMMOGRAM FOR HIGH-RISK PATIENT: Primary | ICD-10-CM

## 2021-08-01 PROBLEM — N18.2 CHRONIC KIDNEY DISEASE (CKD), STAGE II (MILD): Status: RESOLVED | Noted: 2020-08-21 | Resolved: 2021-08-01

## 2021-08-01 PROBLEM — N18.4 STAGE 4 CHRONIC KIDNEY DISEASE (HCC): Status: RESOLVED | Noted: 2021-05-19 | Resolved: 2021-08-01

## 2021-08-02 ENCOUNTER — TELEPHONE (OUTPATIENT)
Dept: INTERNAL MEDICINE CLINIC | Age: 76
End: 2021-08-02

## 2021-08-03 ENCOUNTER — IMMUNIZATION (OUTPATIENT)
Dept: INTERNAL MEDICINE CLINIC | Age: 76
End: 2021-08-03

## 2021-08-11 ENCOUNTER — TELEPHONE (OUTPATIENT)
Dept: INTERNAL MEDICINE CLINIC | Age: 76
End: 2021-08-11

## 2021-08-11 RX ORDER — TRIAMCINOLONE ACETONIDE 1 MG/G
OINTMENT TOPICAL 2 TIMES DAILY
Qty: 45 G | Refills: 1 | Status: SHIPPED | OUTPATIENT
Start: 2021-08-11 | End: 2022-03-03 | Stop reason: ALTCHOICE

## 2021-08-11 NOTE — TELEPHONE ENCOUNTER
Pt called stating that she has a rash on her stomach that has been there for about 2 weeks, she said in the past you have sent in a cream for her and she would like for you to send in more of it.

## 2021-08-12 ENCOUNTER — HOSPITAL ENCOUNTER (OUTPATIENT)
Dept: MAMMOGRAPHY | Age: 76
Discharge: HOME | End: 2021-08-12
Attending: INTERNAL MEDICINE
Payer: MEDICAID

## 2021-08-12 DIAGNOSIS — Z12.31 SCREENING MAMMOGRAM FOR HIGH-RISK PATIENT: ICD-10-CM

## 2021-08-12 PROCEDURE — 77063 BREAST TOMOSYNTHESIS BI: CPT

## 2021-08-13 DIAGNOSIS — R92.8 ABNORMALITY OF RIGHT BREAST ON SCREENING MAMMOGRAM: Primary | ICD-10-CM

## 2021-08-13 NOTE — PROGRESS NOTES
Please call her that she has abnormal mammogram needs right breast ultrasound and diagnostic mammo-orders placed in the computer

## 2021-08-25 ENCOUNTER — HOSPITAL ENCOUNTER (OUTPATIENT)
Dept: MAMMOGRAPHY | Age: 76
Discharge: HOME | End: 2021-08-25
Attending: INTERNAL MEDICINE
Payer: MEDICAID

## 2021-08-25 DIAGNOSIS — R92.8 ABNORMALITY OF RIGHT BREAST ON SCREENING MAMMOGRAM: ICD-10-CM

## 2021-08-25 PROCEDURE — 76642 ULTRASOUND BREAST LIMITED: CPT

## 2021-08-26 DIAGNOSIS — N63.10 BREAST MASS, RIGHT: ICD-10-CM

## 2021-08-26 DIAGNOSIS — R92.8 ABNORMAL MAMMOGRAM OF RIGHT BREAST: Primary | ICD-10-CM

## 2021-08-30 ENCOUNTER — HOSPITAL ENCOUNTER (OUTPATIENT)
Dept: ULTRASOUND IMAGING | Age: 76
Discharge: HOME | End: 2021-08-30
Attending: INTERNAL MEDICINE
Payer: MEDICAID

## 2021-08-30 DIAGNOSIS — N18.4 CHRONIC KIDNEY DISEASE, STAGE IV (SEVERE) (HCC): ICD-10-CM

## 2021-08-30 PROCEDURE — 76770 US EXAM ABDO BACK WALL COMP: CPT

## 2021-09-02 ENCOUNTER — HOSPITAL ENCOUNTER (OUTPATIENT)
Dept: MAMMOGRAPHY | Age: 76
Discharge: HOME | End: 2021-09-02
Attending: INTERNAL MEDICINE
Payer: MEDICAID

## 2021-09-02 DIAGNOSIS — R92.8 ABNORMAL MAMMOGRAM: ICD-10-CM

## 2021-09-02 DIAGNOSIS — N63.10 BREAST MASS, RIGHT: ICD-10-CM

## 2021-09-02 DIAGNOSIS — R92.8 ABNORMAL MAMMOGRAM OF RIGHT BREAST: ICD-10-CM

## 2021-09-02 PROCEDURE — 88305 TISSUE EXAM BY PATHOLOGIST: CPT

## 2021-09-02 PROCEDURE — 88360 TUMOR IMMUNOHISTOCHEM/MANUAL: CPT

## 2021-09-02 PROCEDURE — A4648 IMPLANTABLE TISSUE MARKER: HCPCS

## 2021-09-02 PROCEDURE — 77061 BREAST TOMOSYNTHESIS UNI: CPT

## 2021-09-02 PROCEDURE — 88374 M/PHMTRC ALYS ISHQUANT/SEMIQ: CPT

## 2021-09-03 DIAGNOSIS — C50.911 MALIGNANT NEOPLASM OF RIGHT FEMALE BREAST, UNSPECIFIED ESTROGEN RECEPTOR STATUS, UNSPECIFIED SITE OF BREAST (HCC): Primary | ICD-10-CM

## 2021-09-03 NOTE — PROGRESS NOTES
Referred her to breast surgeon for right breast carcinoma and I had a long talk with Dr. Jenny Ruby who reported me that she is has ultrasound-guided biopsy which is positive for invasive ductal carcinoma right breast

## 2021-09-07 NOTE — PROGRESS NOTES
I had a long talk with Dr. Justin Sanchez who called me she has invasive ductal carcinoma I referred her to breast surgeon Dr. Alban Maddox

## 2021-09-10 RX ORDER — LANOLIN ALCOHOL/MO/W.PET/CERES
325 CREAM (GRAM) TOPICAL
Qty: 30 TABLET | Refills: 5 | Status: SHIPPED | OUTPATIENT
Start: 2021-09-10 | End: 2022-04-20

## 2021-09-21 ENCOUNTER — HOSPITAL ENCOUNTER (OUTPATIENT)
Dept: PREADMISSION TESTING | Age: 76
Discharge: HOME | End: 2021-09-21
Payer: MEDICAID

## 2021-09-21 ENCOUNTER — TELEPHONE (OUTPATIENT)
Dept: INTERNAL MEDICINE CLINIC | Age: 76
End: 2021-09-21

## 2021-09-21 VITALS
HEIGHT: 60 IN | BODY MASS INDEX: 52.38 KG/M2 | WEIGHT: 266.8 LBS | OXYGEN SATURATION: 99 % | TEMPERATURE: 97.7 F | DIASTOLIC BLOOD PRESSURE: 85 MMHG | SYSTOLIC BLOOD PRESSURE: 143 MMHG | HEART RATE: 73 BPM | RESPIRATION RATE: 18 BRPM

## 2021-09-21 LAB
ABO + RH BLD: NORMAL
ANION GAP SERPL CALC-SCNC: 5 MMOL/L (ref 5–15)
ATRIAL RATE: 70 BPM
BLOOD GROUP ANTIBODIES SERPL: NEGATIVE
BUN SERPL-MCNC: 14 MG/DL (ref 6–20)
BUN/CREAT SERPL: 9 (ref 12–20)
CA-I BLD-MCNC: 9.4 MG/DL (ref 8.5–10.1)
CALCULATED P AXIS, ECG09: 48 DEGREES
CALCULATED R AXIS, ECG10: 3 DEGREES
CALCULATED T AXIS, ECG11: 72 DEGREES
CHLORIDE SERPL-SCNC: 106 MMOL/L (ref 97–108)
CO2 SERPL-SCNC: 29 MMOL/L (ref 21–32)
CREAT SERPL-MCNC: 1.52 MG/DL (ref 0.55–1.02)
DIAGNOSIS, 93000: NORMAL
ERYTHROCYTE [DISTWIDTH] IN BLOOD BY AUTOMATED COUNT: 14.1 % (ref 11.5–14.5)
GLUCOSE SERPL-MCNC: 81 MG/DL (ref 65–100)
HCT VFR BLD AUTO: 34.4 % (ref 35–47)
HGB BLD-MCNC: 10.5 G/DL (ref 11.5–16)
MCH RBC QN AUTO: 23 PG (ref 26–34)
MCHC RBC AUTO-ENTMCNC: 30.5 G/DL (ref 30–36.5)
MCV RBC AUTO: 75.4 FL (ref 80–99)
MRSA DNA SPEC QL NAA+PROBE: NOT DETECTED
NRBC # BLD: 0 K/UL (ref 0–0.01)
NRBC BLD-RTO: 0 PER 100 WBC
P-R INTERVAL, ECG05: 206 MS
PLATELET # BLD AUTO: 233 K/UL (ref 150–400)
PMV BLD AUTO: 8.7 FL (ref 8.9–12.9)
POTASSIUM SERPL-SCNC: 4.1 MMOL/L (ref 3.5–5.1)
Q-T INTERVAL, ECG07: 424 MS
QRS DURATION, ECG06: 90 MS
QTC CALCULATION (BEZET), ECG08: 457 MS
RBC # BLD AUTO: 4.56 M/UL (ref 3.8–5.2)
SODIUM SERPL-SCNC: 140 MMOL/L (ref 136–145)
SPECIMEN EXP DATE BLD: NORMAL
VENTRICULAR RATE, ECG03: 70 BPM
WBC # BLD AUTO: 6.1 K/UL (ref 3.6–11)

## 2021-09-21 PROCEDURE — 93005 ELECTROCARDIOGRAM TRACING: CPT

## 2021-09-21 PROCEDURE — 87641 MR-STAPH DNA AMP PROBE: CPT

## 2021-09-21 PROCEDURE — 80048 BASIC METABOLIC PNL TOTAL CA: CPT

## 2021-09-21 PROCEDURE — 86901 BLOOD TYPING SEROLOGIC RH(D): CPT

## 2021-09-21 PROCEDURE — 85027 COMPLETE CBC AUTOMATED: CPT

## 2021-09-21 PROCEDURE — 36415 COLL VENOUS BLD VENIPUNCTURE: CPT

## 2021-09-21 RX ORDER — SUMATRIPTAN 50 MG/1
50 TABLET, FILM COATED ORAL
COMMUNITY
End: 2021-10-15

## 2021-09-21 NOTE — TELEPHONE ENCOUNTER
[facility] with ances office needing a letter stating that it is okay for pt to hold aspirin 5 days prior to having surgery.  [id number]

## 2021-09-21 NOTE — PROGRESS NOTES
Per Dr Elizabeth Hay recommendation , Pt to hold aspirin 5 days prior to surgery. Pt made aware and pt states her old pcp had placed her on the aspirin, has new pcp now. Will call for aspirin hold note.

## 2021-09-24 ENCOUNTER — HOSPITAL ENCOUNTER (OUTPATIENT)
Dept: PREADMISSION TESTING | Age: 76
Discharge: HOME | End: 2021-09-24
Payer: MEDICAID

## 2021-09-24 LAB — SARS-COV-2, COV2: NORMAL

## 2021-09-24 PROCEDURE — U0005 INFEC AGEN DETEC AMPLI PROBE: HCPCS

## 2021-09-25 LAB
SARS-COV-2, XPLCVT: NOT DETECTED
SOURCE, COVRS: NORMAL

## 2021-09-29 ENCOUNTER — ANESTHESIA (OUTPATIENT)
Dept: SURGERY | Age: 76
End: 2021-09-29
Payer: MEDICAID

## 2021-09-29 ENCOUNTER — ANESTHESIA EVENT (OUTPATIENT)
Dept: SURGERY | Age: 76
End: 2021-09-29
Payer: MEDICAID

## 2021-09-29 ENCOUNTER — HOSPITAL ENCOUNTER (OUTPATIENT)
Age: 76
Discharge: HOME | End: 2021-09-29
Attending: SURGERY | Admitting: SURGERY
Payer: MEDICAID

## 2021-09-29 VITALS
RESPIRATION RATE: 16 BRPM | HEART RATE: 69 BPM | TEMPERATURE: 97.4 F | DIASTOLIC BLOOD PRESSURE: 59 MMHG | OXYGEN SATURATION: 100 % | SYSTOLIC BLOOD PRESSURE: 110 MMHG

## 2021-09-29 DIAGNOSIS — Z17.0 MALIGNANT NEOPLASM OF UPPER-OUTER QUADRANT OF RIGHT BREAST IN FEMALE, ESTROGEN RECEPTOR POSITIVE (HCC): Primary | ICD-10-CM

## 2021-09-29 DIAGNOSIS — C50.411 MALIGNANT NEOPLASM OF UPPER-OUTER QUADRANT OF RIGHT BREAST IN FEMALE, ESTROGEN RECEPTOR POSITIVE (HCC): Primary | ICD-10-CM

## 2021-09-29 PROBLEM — C50.919 BREAST CA (HCC): Status: ACTIVE | Noted: 2021-09-29

## 2021-09-29 PROCEDURE — 88307 TISSUE EXAM BY PATHOLOGIST: CPT

## 2021-09-29 PROCEDURE — 74011250637 HC RX REV CODE- 250/637: Performed by: SURGERY

## 2021-09-29 PROCEDURE — 74011000258 HC RX REV CODE- 258: Performed by: ANESTHESIOLOGY

## 2021-09-29 PROCEDURE — 74011250636 HC RX REV CODE- 250/636: Performed by: SURGERY

## 2021-09-29 PROCEDURE — 74011250636 HC RX REV CODE- 250/636: Performed by: ANESTHESIOLOGY

## 2021-09-29 PROCEDURE — 2709999900 HC NON-CHARGEABLE SUPPLY: Performed by: SURGERY

## 2021-09-29 PROCEDURE — 77030040361 HC SLV COMPR DVT MDII -B: Performed by: SURGERY

## 2021-09-29 PROCEDURE — 76060000033 HC ANESTHESIA 1 TO 1.5 HR: Performed by: SURGERY

## 2021-09-29 PROCEDURE — 76010000149 HC OR TIME 1 TO 1.5 HR: Performed by: SURGERY

## 2021-09-29 PROCEDURE — 77030010507 HC ADH SKN DERMBND J&J -B: Performed by: SURGERY

## 2021-09-29 PROCEDURE — 77030002996 HC SUT SLK J&J -A: Performed by: SURGERY

## 2021-09-29 PROCEDURE — 76210000023 HC REC RM PH II 2 TO 2.5 HR: Performed by: SURGERY

## 2021-09-29 PROCEDURE — 77030002933 HC SUT MCRYL J&J -A: Performed by: SURGERY

## 2021-09-29 PROCEDURE — 77030031139 HC SUT VCRL2 J&J -A: Performed by: SURGERY

## 2021-09-29 PROCEDURE — 76210000006 HC OR PH I REC 0.5 TO 1 HR: Performed by: SURGERY

## 2021-09-29 PROCEDURE — 74011000250 HC RX REV CODE- 250: Performed by: SURGERY

## 2021-09-29 PROCEDURE — 74011000250 HC RX REV CODE- 250: Performed by: ANESTHESIOLOGY

## 2021-09-29 PROCEDURE — 36415 COLL VENOUS BLD VENIPUNCTURE: CPT

## 2021-09-29 RX ORDER — FENTANYL CITRATE 50 UG/ML
INJECTION, SOLUTION INTRAMUSCULAR; INTRAVENOUS AS NEEDED
Status: DISCONTINUED | OUTPATIENT
Start: 2021-09-29 | End: 2021-09-29 | Stop reason: HOSPADM

## 2021-09-29 RX ORDER — CEFAZOLIN SODIUM 1 G/3ML
INJECTION, POWDER, FOR SOLUTION INTRAMUSCULAR; INTRAVENOUS
Status: DISCONTINUED
Start: 2021-09-29 | End: 2021-09-29 | Stop reason: HOSPADM

## 2021-09-29 RX ORDER — NEOSTIGMINE METHYLSULFATE 5 MG/5 ML
SYRINGE (ML) INTRAVENOUS AS NEEDED
Status: DISCONTINUED | OUTPATIENT
Start: 2021-09-29 | End: 2021-09-29 | Stop reason: HOSPADM

## 2021-09-29 RX ORDER — HYDROMORPHONE HYDROCHLORIDE 1 MG/ML
0.2 INJECTION, SOLUTION INTRAMUSCULAR; INTRAVENOUS; SUBCUTANEOUS
Status: DISCONTINUED | OUTPATIENT
Start: 2021-09-29 | End: 2021-09-29 | Stop reason: HOSPADM

## 2021-09-29 RX ORDER — HYDROCODONE BITARTRATE AND ACETAMINOPHEN 5; 325 MG/1; MG/1
1 TABLET ORAL
Qty: 20 TABLET | Refills: 0 | Status: SHIPPED | OUTPATIENT
Start: 2021-09-29 | End: 2021-10-02

## 2021-09-29 RX ORDER — SODIUM CHLORIDE 0.9 % (FLUSH) 0.9 %
5-40 SYRINGE (ML) INJECTION EVERY 8 HOURS
Status: DISCONTINUED | OUTPATIENT
Start: 2021-09-29 | End: 2021-09-29 | Stop reason: HOSPADM

## 2021-09-29 RX ORDER — MIDAZOLAM HYDROCHLORIDE 1 MG/ML
INJECTION, SOLUTION INTRAMUSCULAR; INTRAVENOUS
Status: COMPLETED
Start: 2021-09-29 | End: 2021-09-29

## 2021-09-29 RX ORDER — ONDANSETRON 2 MG/ML
4 INJECTION INTRAMUSCULAR; INTRAVENOUS AS NEEDED
Status: DISCONTINUED | OUTPATIENT
Start: 2021-09-29 | End: 2021-09-29 | Stop reason: HOSPADM

## 2021-09-29 RX ORDER — SODIUM CHLORIDE 9 MG/ML
20 INJECTION, SOLUTION INTRAVENOUS CONTINUOUS
Status: DISCONTINUED | OUTPATIENT
Start: 2021-09-29 | End: 2021-09-29 | Stop reason: HOSPADM

## 2021-09-29 RX ORDER — FENTANYL CITRATE 50 UG/ML
INJECTION, SOLUTION INTRAMUSCULAR; INTRAVENOUS
Status: COMPLETED
Start: 2021-09-29 | End: 2021-09-29

## 2021-09-29 RX ORDER — FENTANYL CITRATE 50 UG/ML
25 INJECTION, SOLUTION INTRAMUSCULAR; INTRAVENOUS
Status: DISCONTINUED | OUTPATIENT
Start: 2021-09-29 | End: 2021-09-29 | Stop reason: HOSPADM

## 2021-09-29 RX ORDER — SODIUM CHLORIDE 0.9 % (FLUSH) 0.9 %
5-40 SYRINGE (ML) INJECTION AS NEEDED
Status: DISCONTINUED | OUTPATIENT
Start: 2021-09-29 | End: 2021-09-29 | Stop reason: HOSPADM

## 2021-09-29 RX ORDER — ONDANSETRON 2 MG/ML
INJECTION INTRAMUSCULAR; INTRAVENOUS AS NEEDED
Status: DISCONTINUED | OUTPATIENT
Start: 2021-09-29 | End: 2021-09-29 | Stop reason: HOSPADM

## 2021-09-29 RX ORDER — BUPIVACAINE HYDROCHLORIDE 2.5 MG/ML
INJECTION, SOLUTION EPIDURAL; INFILTRATION; INTRACAUDAL AS NEEDED
Status: DISCONTINUED | OUTPATIENT
Start: 2021-09-29 | End: 2021-09-29 | Stop reason: HOSPADM

## 2021-09-29 RX ORDER — ROCURONIUM BROMIDE 10 MG/ML
INJECTION, SOLUTION INTRAVENOUS AS NEEDED
Status: DISCONTINUED | OUTPATIENT
Start: 2021-09-29 | End: 2021-09-29 | Stop reason: HOSPADM

## 2021-09-29 RX ORDER — PROPOFOL 10 MG/ML
INJECTION, EMULSION INTRAVENOUS AS NEEDED
Status: DISCONTINUED | OUTPATIENT
Start: 2021-09-29 | End: 2021-09-29 | Stop reason: HOSPADM

## 2021-09-29 RX ORDER — HYDROCODONE BITARTRATE AND ACETAMINOPHEN 5; 325 MG/1; MG/1
1 TABLET ORAL
Status: DISCONTINUED | OUTPATIENT
Start: 2021-09-29 | End: 2021-09-29 | Stop reason: HOSPADM

## 2021-09-29 RX ORDER — MIDAZOLAM HYDROCHLORIDE 1 MG/ML
INJECTION, SOLUTION INTRAMUSCULAR; INTRAVENOUS AS NEEDED
Status: DISCONTINUED | OUTPATIENT
Start: 2021-09-29 | End: 2021-09-29 | Stop reason: HOSPADM

## 2021-09-29 RX ORDER — SODIUM CHLORIDE, SODIUM LACTATE, POTASSIUM CHLORIDE, CALCIUM CHLORIDE 600; 310; 30; 20 MG/100ML; MG/100ML; MG/100ML; MG/100ML
INJECTION, SOLUTION INTRAVENOUS
Status: DISCONTINUED | OUTPATIENT
Start: 2021-09-29 | End: 2021-09-29 | Stop reason: HOSPADM

## 2021-09-29 RX ORDER — DEXAMETHASONE SODIUM PHOSPHATE 4 MG/ML
INJECTION, SOLUTION INTRA-ARTICULAR; INTRALESIONAL; INTRAMUSCULAR; INTRAVENOUS; SOFT TISSUE AS NEEDED
Status: DISCONTINUED | OUTPATIENT
Start: 2021-09-29 | End: 2021-09-29 | Stop reason: HOSPADM

## 2021-09-29 RX ADMIN — Medication 3 MG: at 09:31

## 2021-09-29 RX ADMIN — ROCURONIUM BROMIDE 40 MG: 50 INJECTION, SOLUTION INTRAVENOUS at 08:57

## 2021-09-29 RX ADMIN — ONDANSETRON 4 MG: 2 INJECTION INTRAMUSCULAR; INTRAVENOUS at 09:06

## 2021-09-29 RX ADMIN — CEFAZOLIN 3 G: 10 INJECTION, POWDER, FOR SOLUTION INTRAVENOUS at 08:57

## 2021-09-29 RX ADMIN — HYDROMORPHONE HYDROCHLORIDE 0.2 MG: 1 INJECTION, SOLUTION INTRAMUSCULAR; INTRAVENOUS; SUBCUTANEOUS at 10:27

## 2021-09-29 RX ADMIN — HYDROMORPHONE HYDROCHLORIDE 0.2 MG: 1 INJECTION, SOLUTION INTRAMUSCULAR; INTRAVENOUS; SUBCUTANEOUS at 10:13

## 2021-09-29 RX ADMIN — HYDROCODONE BITARTRATE AND ACETAMINOPHEN 1 TABLET: 5; 325 TABLET ORAL at 11:49

## 2021-09-29 RX ADMIN — PROPOFOL 200 MG: 10 INJECTION, EMULSION INTRAVENOUS at 08:54

## 2021-09-29 RX ADMIN — DEXAMETHASONE SODIUM PHOSPHATE 8 MG: 4 INJECTION, SOLUTION INTRA-ARTICULAR; INTRALESIONAL; INTRAMUSCULAR; INTRAVENOUS; SOFT TISSUE at 09:37

## 2021-09-29 RX ADMIN — SODIUM CHLORIDE, POTASSIUM CHLORIDE, SODIUM LACTATE AND CALCIUM CHLORIDE: 600; 310; 30; 20 INJECTION, SOLUTION INTRAVENOUS at 08:42

## 2021-09-29 RX ADMIN — Medication 10 ML: at 10:30

## 2021-09-29 RX ADMIN — DEXAMETHASONE SODIUM PHOSPHATE 8 MG: 4 INJECTION, SOLUTION INTRA-ARTICULAR; INTRALESIONAL; INTRAMUSCULAR; INTRAVENOUS; SOFT TISSUE at 09:04

## 2021-09-29 RX ADMIN — SODIUM CHLORIDE 20 ML/HR: 9 INJECTION, SOLUTION INTRAVENOUS at 07:24

## 2021-09-29 RX ADMIN — FENTANYL CITRATE 50 MCG: 50 INJECTION, SOLUTION INTRAMUSCULAR; INTRAVENOUS at 08:58

## 2021-09-29 RX ADMIN — HYDROMORPHONE HYDROCHLORIDE 0.2 MG: 1 INJECTION, SOLUTION INTRAMUSCULAR; INTRAVENOUS; SUBCUTANEOUS at 10:35

## 2021-09-29 RX ADMIN — PHENYLEPHRINE HYDROCHLORIDE 100 MCG: 10 INJECTION INTRAVENOUS at 09:37

## 2021-09-29 RX ADMIN — MIDAZOLAM HYDROCHLORIDE 2 MG: 2 INJECTION, SOLUTION INTRAMUSCULAR; INTRAVENOUS at 08:57

## 2021-09-29 NOTE — DISCHARGE INSTRUCTIONS
You may shower tomorrow. Pain medication has been sent to your pharmacy. You may also use tylenol or motrin for pain. Call office with symptoms of pain not controlled by pain medications, drainage from incision, redness at incision.

## 2021-09-29 NOTE — ANESTHESIA POSTPROCEDURE EVALUATION
Procedure(s):  RIGHT PARTIAL MASTECTOMY. No value filed. Anesthesia Post Evaluation      Multimodal analgesia: multimodal analgesia used between 6 hours prior to anesthesia start to PACU discharge  Patient location during evaluation: PACU  Patient participation: complete - patient participated  Level of consciousness: awake  Pain score: 0  Pain management: adequate  Airway patency: patent  Anesthetic complications: no  Cardiovascular status: acceptable  Respiratory status: acceptable  Hydration status: acceptable  Post anesthesia nausea and vomiting:  controlled  Final Post Anesthesia Temperature Assessment:  Normothermia (36.0-37.5 degrees C)      INITIAL Post-op Vital signs: No vitals data found for the desired time range.

## 2021-09-29 NOTE — PROGRESS NOTES
X2 STICKS IN LEFT ARM FOR IV REMAIN UNABLE TO GE IV   , DR DAVE STATES OK TO START IV IN RIGHT [facility]

## 2021-09-29 NOTE — BRIEF OP NOTE
Brief Postoperative Note    Patient: Morena Saldana  YOB: 1945  MRN: [id number]    Date of Procedure: 9/29/2021     Pre-Op Diagnosis: BREAST CANCER    Post-Op Diagnosis: Same as preoperative diagnosis.       Procedure(s):  RIGHT PARTIAL MASTECTOMY    Surgeon(s):  Jossy Govea MD    Surgical Assistant: Surg Asst-1: Cristina House    Anesthesia: General     Estimated Blood Loss (mL): Minimal    Complications: None    Specimens: * No specimens in log *     Implants: * No implants in log *    Drains: * No LDAs found *    Findings: Clip and mass in specimen    Electronically Signed by Artie Hernandez MD on 9/29/2021 at 9:35 AM

## 2021-09-29 NOTE — OP NOTES
DATE: 9/29/2021    PROCEDURE: 1) Right localized partial mastectomy with ultrasound guidance 2) intraoperative interpretation of specimen radiograph     SURGEON: Karren Alpers MD    ASSIST: Henry Lopez    ANESTHESIA: General    EBL: Less than 10 ML    FLUIDS: 1000 ML    FINDINGS: Mass and clip in specimen radiograph    COMPLICATIONS: None immediate    DISPO: Patient was woken up and transported to PACU in stable condition. INDICATIONS:    Ms. Kenia Mcbride is a 70year old woman who presented with an invasive ductal carcinoma of the right breast. She desired breast conservation and therefore was scheduled electively for a right breast partial mastectomy,. She opted to avoid sentinel node biopsy based on age and tumor morphology. Prior to the procedure she expressed understanding of the risks of bleeding, infection, positive margin and need for further procedures. PROCEDURE: The patient was taken back to the operating room, placed on the operating table in supine position. Bilateral Venodynes were placed. She received a perioperative dose of antibiotics. After successful administration of anesthesia, the operative field was prepped anddraped in usual sterile fashion and a time-out was performed according to standard institutional protocol. The mass was located in the right breast with ultrasound. The ultrasound showed at 9 o'clock 9 cm from the nipple an irregular hypoechoic mass measuring 1.13 by 0.68 by 0.9 cm. The skin overlying the mass was marked. Local anesthetic was infiltrated in the right breast. A circumlinear incision was made. The soft tissues were dissected down with electrocautery. The mass was located, excised, marked, and sent for specimen radiograph. This demonstrated the mass and clip in the specimen. The specimen was then sent for permanent pathologic analysis. Additional margins were taken and marked and sent for permanent pathologic analysis. The lumpectomy bed was clipped. The wound bed was irrigated. Hemostasis was obtained. The incision was closed with interrupted 3-0 Vicryl stitches placed in the deep dermis. The skin was closed with a running 4-0 Monocryl stitch placed in subcuticular fashion. Appropriate dressing was applied. The patient was woken up and transported to PACU in stable condition.

## 2021-09-29 NOTE — ANESTHESIA PREPROCEDURE EVALUATION
Relevant Problems   RESPIRATORY SYSTEM   (+) Asthma   (+) COPD (chronic obstructive pulmonary disease) (HCC)   (+) History of hepatitis C   (+) Sleep apnea      NEUROLOGY   (+) Depressive disorder   (+) Headache disorder   (+) Situational depression      CARDIOVASCULAR   (+) Essential hypertension      GASTROINTESTINAL   (+) Chronic hepatitis (HCC)   (+) Cirrhosis of liver (HCC)   (+) Liver disease, chronic, with cirrhosis (HCC)      RENAL FAILURE   (+) Stage 3b chronic kidney disease (HCC)      ENDOCRINE   (+) Morbid obesity (HCC)      HEMATOLOGY   (+) Anemia      PERSONAL HX & FAMILY HX OF CANCER   (+) Breast CA (HCC)       Anesthetic History   No history of anesthetic complications            Review of Systems / Medical History  Patient summary reviewed, nursing notes reviewed and pertinent labs reviewed    Pulmonary    COPD: mild    Sleep apnea    Asthma        Neuro/Psych         Psychiatric history     Cardiovascular    Hypertension                   GI/Hepatic/Renal           Liver disease     Endo/Other        Morbid obesity and arthritis     Other Findings   Comments: Chronic hepatitis (HCC)  Asthma  COPD (chronic obstructive pulmonary disease) (HCC)  Cirrhosis of liver (HCC)  Sleep apnea  Lumbar radiculopathy  Essential hypertension  History of hepatitis C  Liver disease, chronic, with cirrhosis (HCC)  Anemia   Depressive disorder   Edema   Vitamin D deficiency   Pure hypercholesterolemia with target low density lipoprotein (LDL) cholesterol less than 130 mg/dL   Pain, knee   RUQ abdominal pain   Osteoarthritis   Morbid obesity (HCC)   History of mood disorder   History of heart attack   Administrative encounter   Periumbilical abdominal pain   Straining during bowel movements   Hypercalcemia   Chronic bilateral low back pain, unspecified whether sciatica present   Situational depression   Increase in serum creatinine from prior measurement   Headache disorder   Stage 3b chronic kidney disease (HCC)   History of esophageal varices   Breast CA (HCC)            Physical Exam    Airway  Mallampati: II  TM Distance: < 4 cm  Neck ROM: decreased range of motion, short neck   Mouth opening: Diminished (comment)     Cardiovascular    Rhythm: regular  Rate: normal         Dental    Dentition: Loose teeth and Poor dentition     Pulmonary  Breath sounds clear to auscultation               Abdominal  Abdominal exam normal       Other Findings            Anesthetic Plan    ASA: 3  Anesthesia type: general          Induction: Intravenous  Anesthetic plan and risks discussed with: Patient and Son / Daughter

## 2021-10-06 ENCOUNTER — NURSE NAVIGATOR (OUTPATIENT)
Dept: CASE MANAGEMENT | Age: 76
End: 2021-10-06

## 2021-10-06 NOTE — NURSE NAVIGATOR
[employer]  Breast Navigator Encounter    Name: Celsa Mckenzie  Age: 70 y.o.  : 1945  Diagnosis: Right breast IDC; ER+/SD+/HER2-    Encounter type:  []Patient Initiated  []Navigator Follow-up []Pre-op  []Post-op  []Check-in Prior to First Treatment []Treatment Modality Change  []Survivorship Transition [x]Other:     Narrative:   Called pt to introduce self/role of Breast Navigator. Ms. Raquel Hyde reports doing well after surgery although still sore. She confirms follow up with Dr. Ernestina Jamison on Monday 10/11. No questions or concerns at this time. Gave Ms. Raquel Hyde my contact info and encouraged her to reach out as needed.      Interdisciplinary Team:  Med-Onc:  Surg-Onc: Dr. Merissa Burk MD  Rad-Onc:  Plastics:  :   Nurse Navigator:  CHARLA Olsen, RN, CHARLA Ferguson, RN, [facility]  Oncology Breast Navigator    [employer]  [address] [place] [address]  W: [phone]  F: [phone]  [email or web address]   Good Help to Those in [facility]

## 2021-10-15 RX ORDER — SUMATRIPTAN 50 MG/1
TABLET, FILM COATED ORAL
Qty: 10 TABLET | Refills: 1 | Status: SHIPPED | OUTPATIENT
Start: 2021-10-15 | End: 2022-01-03

## 2021-12-02 ENCOUNTER — OFFICE VISIT (OUTPATIENT)
Dept: INTERNAL MEDICINE CLINIC | Age: 76
End: 2021-12-02
Payer: MEDICAID

## 2021-12-02 VITALS
HEART RATE: 72 BPM | HEIGHT: 60 IN | BODY MASS INDEX: 51.79 KG/M2 | TEMPERATURE: 98 F | SYSTOLIC BLOOD PRESSURE: 110 MMHG | OXYGEN SATURATION: 93 % | DIASTOLIC BLOOD PRESSURE: 70 MMHG | WEIGHT: 263.8 LBS

## 2021-12-02 DIAGNOSIS — Z87.19 HISTORY OF ESOPHAGEAL VARICES: ICD-10-CM

## 2021-12-02 DIAGNOSIS — J44.9 CHRONIC OBSTRUCTIVE PULMONARY DISEASE, UNSPECIFIED COPD TYPE (HCC): ICD-10-CM

## 2021-12-02 DIAGNOSIS — G89.29 CHRONIC BILATERAL LOW BACK PAIN, UNSPECIFIED WHETHER SCIATICA PRESENT: ICD-10-CM

## 2021-12-02 DIAGNOSIS — I25.2 HISTORY OF HEART ATTACK: ICD-10-CM

## 2021-12-02 DIAGNOSIS — R51.9 HEADACHE DISORDER: ICD-10-CM

## 2021-12-02 DIAGNOSIS — G47.30 SLEEP APNEA, UNSPECIFIED TYPE: ICD-10-CM

## 2021-12-02 DIAGNOSIS — E55.9 VITAMIN D DEFICIENCY: ICD-10-CM

## 2021-12-02 DIAGNOSIS — F41.9 ANXIETY: ICD-10-CM

## 2021-12-02 DIAGNOSIS — K21.9 GASTROESOPHAGEAL REFLUX DISEASE WITHOUT ESOPHAGITIS: ICD-10-CM

## 2021-12-02 DIAGNOSIS — Z86.19 HISTORY OF HEPATITIS C: ICD-10-CM

## 2021-12-02 DIAGNOSIS — K74.69 OTHER CIRRHOSIS OF LIVER (HCC): ICD-10-CM

## 2021-12-02 DIAGNOSIS — N18.9 ANEMIA DUE TO CHRONIC KIDNEY DISEASE, UNSPECIFIED CKD STAGE: ICD-10-CM

## 2021-12-02 DIAGNOSIS — I10 ESSENTIAL HYPERTENSION: ICD-10-CM

## 2021-12-02 DIAGNOSIS — D63.1 ANEMIA DUE TO CHRONIC KIDNEY DISEASE, UNSPECIFIED CKD STAGE: ICD-10-CM

## 2021-12-02 DIAGNOSIS — Z85.3 HISTORY OF BREAST CANCER: ICD-10-CM

## 2021-12-02 DIAGNOSIS — Z86.59 HISTORY OF MOOD DISORDER: ICD-10-CM

## 2021-12-02 DIAGNOSIS — M54.50 CHRONIC BILATERAL LOW BACK PAIN, UNSPECIFIED WHETHER SCIATICA PRESENT: ICD-10-CM

## 2021-12-02 DIAGNOSIS — F43.21 SITUATIONAL DEPRESSION: ICD-10-CM

## 2021-12-02 DIAGNOSIS — N18.2 STAGE 2 CHRONIC KIDNEY DISEASE: ICD-10-CM

## 2021-12-02 PROCEDURE — 99214 OFFICE O/P EST MOD 30 MIN: CPT | Performed by: INTERNAL MEDICINE

## 2021-12-02 RX ORDER — ANASTROZOLE 1 MG/1
1 TABLET ORAL DAILY
COMMUNITY
Start: 2021-11-01

## 2021-12-02 NOTE — PROGRESS NOTES
Bard Ambrose is a 70 y.o. female and presents with Follow Up Chronic Condition    Ms. Vitale January came for regular follow-up. She recently underwent surgery for her right breast cancer in the end of September. She has recovered very well and now she has been started anastrozole. Her blood pressure is controlled without being on amlodipine or diuretics. She has stable mental health and no anxiety depression and being stable on getting duloxetine and escitalopram.  She follows neurologist for headache but now she has no more headache she takes vitamin D and vitamin B12. She has liver cirrhosis and she has stopped seeing,Hepatologist at [facility] and she is preferring somebody in local area that I referred to Dr. Vincent Song. She has a rescue inhaler with history of asthma being smoker in the past.  She takes sumatriptan as needed. Otherwise she is compliant and has pleasant personality and recommended to lose weight and was smiling and laughing. She will do blood work. He is taken Covid vaccines. Review of Systems    Review of Systems   Constitutional: Negative. HENT: Negative for sore throat. Eyes: Negative for blurred vision. Respiratory: Negative for cough, wheezing and stridor. Cardiovascular: Negative. Gastrointestinal: Negative. Genitourinary: Negative for dysuria, frequency and hematuria. Musculoskeletal: Negative. Neurological: Negative for dizziness, tingling, tremors, sensory change and headaches. Psychiatric/Behavioral: Negative for depression, hallucinations and substance abuse.         Past Medical History:   Diagnosis Date    Anemia     Asthma 8/21/2020    Bipolar 1 disorder ([address] 8/21/2020    Breast CA ([address] 9/29/2021    Chronic hepatitis ([address] 8/21/2020    Chronic kidney disease (CKD), stage II (mild) 8/21/2020    Cirrhosis of liver ([address] 8/21/2020    COPD (chronic obstructive pulmonary disease) ([address] 8/21/2020    Depressive disorder 8/21/2020    Edema 2020    Headache disorder 2021    History of mood disorder 2020    Hypertension     Lumbar radiculopathy 2020    Morbid obesity ([address] 2020    Osteoarthritis 2020    Pain, knee 2020    Pure hypercholesterolemia with target low density lipoprotein (LDL) cholesterol less than 130 mg/dL 2020    RUQ abdominal pain 2020    Sleep apnea 2020    Vitamin D deficiency 2020     Past Surgical History:   Procedure Laterality Date    HX BREAST BIOPSY Right     benign    HX BREAST LUMPECTOMY Right 2021    RIGHT PARTIAL MASTECTOMY performed by Ana Webster MD at [facility] HX COLONOSCOPY      HX COLONOSCOPY      HX CYST INCISION AND DRAINAGE      HX GYN      HX TOTAL ABDOMINAL HYSTERECTOMY      [address] LIVER NEEDLE [address] OTHER [address]       Social History     Socioeconomic History    Marital status:    Tobacco Use    Smoking status: Former Smoker     Packs/day: 1.00     Years: 30.00     Pack years: 30.00     Quit date:      Years since quittin.9    Smokeless tobacco: Never Used   Vaping Use    Vaping Use: Never used   Substance and Sexual Activity    Alcohol use: Not Currently     Comment: on birthday last year    Drug use: Never    Sexual activity: Not Currently     Family History   Problem Relation Age of Onset    Hypertension Mother     Hypertension Brother     Heart Disease Brother     Hypertension Maternal Grandmother      Current Outpatient Medications   Medication Sig Dispense Refill    anastrozole (ARIMIDEX) 1 mg tablet Take 1 mg by mouth daily.  SUMAtriptan (IMITREX) 50 mg tablet TAKE 1 TABLET BY MOUTH ONCE AS NEEDED FOR MIGRAINE FOR UP TO 1 DOSE. 10 Tablet 1    ferrous sulfate 325 mg (65 mg iron) tablet Take 1 Tablet by mouth Daily (before breakfast). 30 Tablet 5    triamcinolone acetonide (KENALOG) 0.1 % ointment Apply  to affected area two (2) times a day.  use twice a day for 1 week then as needed for the rash on the trunk 45 g 1    famotidine (PEPCID) 40 mg tablet TAKE 1 TABLET BY MOUTH EVERY DAY 30 Tab 5    escitalopram oxalate (LEXAPRO) 10 mg tablet Take 1 Tab by mouth daily. 30 Tab 4    DULoxetine (CYMBALTA) 30 mg capsule Take 1 Cap by mouth two (2) times a day. 60 Cap 4    pantoprazole (PROTONIX) 40 mg tablet TAKE 1 TABLET BY MOUTH TWICE A DAY BEFORE BREAKFAST AND DINNER 30 Tab 5    albuterol (PROVENTIL VENTOLIN) 2.5 mg /3 mL (0.083 %) nebu USE 1 VIAL IN NEBULIZER EVERY 4-6 HOURS AS NEEDED 375 mL 11    ProAir HFA 90 mcg/actuation inhaler INHALE 2 PUFFS BY MOUTH EVERY 4 HOURS AS NEEDED FOR WHEEZE 25.5 Inhaler 5    aspirin delayed-release 81 mg tablet Take 1 Tab by mouth daily.  fluticasone propionate (FLONASE) 50 mcg/actuation nasal spray 2 Sprays by Both Nostrils route daily. 1 Bottle 5    diclofenac (VOLTAREN) 1 % gel Apply 2 g to affected area two (2) times a day.  latanoprost (XALATAN) 0.005 % ophthalmic solution Administer 1 Drop to both eyes daily.  cyanocobalamin (VITAMIN B12) 500 mcg tablet Take 1 Tab by mouth daily.  cholecalciferol (VITAMIN D3) (1000 Units /25 mcg) tablet Take 1 Tab by mouth daily.  calcium-cholecalciferol, D3, (CALTRATE 600+D) tablet Take 1 Tab by mouth daily. No Known Allergies    Objective:  Visit Vitals  /70 (BP 1 Location: Left upper arm, BP Patient Position: Sitting, BP Cuff Size: Large adult)   Pulse 72   Temp 98 °F (36.7 °C) (Oral)   Ht 5' (1.524 m)   Wt 263 lb 12.8 oz (119.7 kg)   SpO2 93%   BMI 51.52 kg/m²       Physical Exam:   Constitutional: General Appearance: . Pleasant. Level of Distress: NAD. Psychiatric: Mental Status: normal mood and affect Orientation: to time, place, and person. ,normal eye contact. Head: Head: normocephalic and atraumatic. Eyes: Pupils: PERRLA. Sclerae: non-icteric. Neck: Neck: supple, trachea midline, and no masses. Lymph Nodes: no cervical LAD.  Thyroid: no enlargement or nodules and non-tender. Lungs: Respiratory effort: no dyspnea. Auscultation: no wheezing, rales/crackles, or rhonchi and breath sounds normal and good air movement. Cardiovascular: Apical Impulse: not displaced. Heart Auscultation: normal S1 and S2; no murmurs, rubs, or gallops; and RRR. Neck vessels: no carotid bruits. Pulses including femoral / pedal: normal throughout. Abdomen: Bowel Sounds: normal. Inspection and Palpation: no tenderness, guarding, or masses and soft and non-distended. Liver: non-tender and no hepatomegaly. Spleen: non-tender and no splenomegaly. Musculoskeletal[de-identified] Extremities: no edema,no varicosities. No Calf tenderness. Neurologic: Gait and Station: normal gait and station. Motor Strength normal right and left. Skin: Inspection and palpation: no rash, lesions, or ulcer. Results for orders placed or performed during the hospital encounter of 09/24/21   SARS-COV-2   Result Value Ref Range    SARS-CoV-2 Please find results under separate order     SARS-COV-2   Result Value Ref Range    Specimen source Nasopharyngeal      SARS-CoV-2 Not detected Not detected       Assessment/Plan:      ICD-10-CM ICD-9-CM    1. Anxiety  F41.9 300.00    2. Anemia due to chronic kidney disease, unspecified CKD stage  N18.9 285.21 CBC WITH AUTOMATED DIFF    D02.3  METABOLIC PANEL, COMPREHENSIVE   3. Situational depression  F43.21 309.0    4. Other cirrhosis of liver (HCC)  K74.69 571.5 REFERRAL TO GASTROENTEROLOGY   5. Stage 2 chronic kidney disease  N18.2 585.2 CBC WITH AUTOMATED DIFF      METABOLIC PANEL, COMPREHENSIVE   6. Chronic obstructive pulmonary disease, unspecified COPD type ([facility]  J44.9 496 TSH 3RD GENERATION   7. Gastroesophageal reflux disease without esophagitis  K21.9 530.81    8. Chronic bilateral low back pain, unspecified whether sciatica present  M54.50 724.2     G89.29 338.29    9. Headache disorder  R51.9 784.0    10. Essential hypertension  I10 401.9    11.  Vitamin D deficiency E55.9 268.9 VITAMIN D, 25 HYDROXY   12. History of hepatitis C  Z86.19 V12.09 REFERRAL TO GASTROENTEROLOGY   13. History of esophageal varices  Z87.19 V12.79 REFERRAL TO GASTROENTEROLOGY   14. History of mood disorder  Z86.59 V11.1    15. History of heart attack  I25.2 412    16. Sleep apnea, unspecified type  G47.30 780.57    17. History of breast cancer  Z85.3 V10.3    18. BMI 50.0-59.9, adult ([address]  Z68.43 V85.43      Orders Placed This Encounter    CBC WITH AUTOMATED DIFF    METABOLIC PANEL, COMPREHENSIVE    TSH 3RD GENERATION    VITAMIN D, 25 HYDROXY    [address] [facility] [address] [facility]     Referral Priority:   Routine     Referral Type:   Consultation     Referral Reason:   Specialty Services Required     Referred to Provider:   Robert Woods MD     Number of Visits Requested:   1    anastrozole (ARIMIDEX) 1 mg tablet     Sig: Take 1 mg by mouth daily. Hypertension she is not taking amlodipine and blood pressure is very well controlled educated to take diet low in sodium and healthy lifestyle and eat more vegetables and nonsugary foods and stop eating starch and sugar-containing, snacks and desserts and restricted calorie intake and we will check her blood pressure again. COPD with history of exsmoking taking rescue and maintenance inhaler. Anxiety with depression which is very stable being on duloxetine and escitalopram.  No negative thoughts or suicidal thoughts. Liver cirrhosis with history of hepatitis C and esophageal varices. She has stopped seeing hepatologist at [facility] she prefers in the local area so I referred her to gastroenterologist Juni Rojas if he can accept her and explain her otherwise she has to make appointment at [facility]. Patient voiced understanding. Breast cancer in the right breast had undergone surgery in her right breast  on 29 September and now she is getting anastrozole.   She also follows  medical oncologist.    Allergic rhinitis continue fluticasone nasal spray.    History of headache had consulted neurologist taking sumatriptan as a rescue medicine not on Topamax. CKD stage II to stage III with anemia due to CKD has consulted,???  Nephrologist avoid NSAIDs and avoid dehydration and continue iron supplementation. GERD taking famotidine. Morbid obesity lifestyle modification discussed in length about restricted calorie diet and portion control and walking as much as. Labs ordered. Follow-up in 3 months. lose weight, increase physical activity, bring BP log to office visit, follow low fat diet, follow low salt diet, continue present plan, routine labs ordered, call if any problems    There are no Patient Instructions on file for this visit. Follow-up and Dispositions    · Return in about 3 months (around 3/2/2022) for follow up for chronic condition llab now.

## 2021-12-02 NOTE — PROGRESS NOTES
1. Have you been to the ER, urgent care clinic since your last visit? Hospitalized since your last visit? No    2. Have you seen or consulted any other health care providers outside of the [address] [place] [address] since your last visit? Include any pap smears or colon screening.  Surgeon for breast cancer    Chief Complaint   Patient presents with    Follow Up Chronic Condition     Visit Vitals  /79 (BP 1 Location: Right upper arm, BP Patient Position: Sitting)   Pulse 94   Temp 98 °F (36.7 °C) (Oral)   Ht 5' (1.524 m)   Wt 263 lb 12.8 oz (119.7 kg)   SpO2 93%   BMI 51.52 kg/m²

## 2021-12-03 LAB
25(OH)D3+25(OH)D2 SERPL-MCNC: 31 NG/ML (ref 30–100)
ALBUMIN SERPL-MCNC: 4 G/DL (ref 3.7–4.7)
ALBUMIN/GLOB SERPL: 1.1 {RATIO} (ref 1.2–2.2)
ALP SERPL-CCNC: 89 IU/L (ref 44–121)
ALT SERPL-CCNC: 6 IU/L (ref 0–32)
AST SERPL-CCNC: 21 IU/L (ref 0–40)
BASOPHILS # BLD AUTO: 0 X10E3/UL (ref 0–0.2)
BASOPHILS NFR BLD AUTO: 1 %
BILIRUB SERPL-MCNC: 0.4 MG/DL (ref 0–1.2)
BUN SERPL-MCNC: 16 MG/DL (ref 8–27)
BUN/CREAT SERPL: 9 (ref 12–28)
CALCIUM SERPL-MCNC: 10.2 MG/DL (ref 8.7–10.3)
CHLORIDE SERPL-SCNC: 100 MMOL/L (ref 96–106)
CO2 SERPL-SCNC: 23 MMOL/L (ref 20–29)
CREAT SERPL-MCNC: 1.84 MG/DL (ref 0.57–1)
EOSINOPHIL # BLD AUTO: 0.1 X10E3/UL (ref 0–0.4)
EOSINOPHIL NFR BLD AUTO: 2 %
ERYTHROCYTE [DISTWIDTH] IN BLOOD BY AUTOMATED COUNT: 13.8 % (ref 11.7–15.4)
GLOBULIN SER CALC-MCNC: 3.8 G/DL (ref 1.5–4.5)
GLUCOSE SERPL-MCNC: 89 MG/DL (ref 65–99)
HCT VFR BLD AUTO: 40.7 % (ref 34–46.6)
HGB BLD-MCNC: 12.2 G/DL (ref 11.1–15.9)
IMM GRANULOCYTES # BLD AUTO: 0 X10E3/UL (ref 0–0.1)
IMM GRANULOCYTES NFR BLD AUTO: 0 %
LYMPHOCYTES # BLD AUTO: 1.1 X10E3/UL (ref 0.7–3.1)
LYMPHOCYTES NFR BLD AUTO: 20 %
MCH RBC QN AUTO: 22.5 PG (ref 26.6–33)
MCHC RBC AUTO-ENTMCNC: 30 G/DL (ref 31.5–35.7)
MCV RBC AUTO: 75 FL (ref 79–97)
MONOCYTES # BLD AUTO: 0.3 X10E3/UL (ref 0.1–0.9)
MONOCYTES NFR BLD AUTO: 6 %
NEUTROPHILS # BLD AUTO: 4 X10E3/UL (ref 1.4–7)
NEUTROPHILS NFR BLD AUTO: 71 %
PLATELET # BLD AUTO: 301 X10E3/UL (ref 150–450)
POTASSIUM SERPL-SCNC: 4.6 MMOL/L (ref 3.5–5.2)
PROT SERPL-MCNC: 7.8 G/DL (ref 6–8.5)
RBC # BLD AUTO: 5.42 X10E6/UL (ref 3.77–5.28)
SODIUM SERPL-SCNC: 138 MMOL/L (ref 134–144)
TSH SERPL DL<=0.005 MIU/L-ACNC: 3.31 UIU/ML (ref 0.45–4.5)
WBC # BLD AUTO: 5.6 X10E3/UL (ref 3.4–10.8)

## 2021-12-03 NOTE — PROGRESS NOTES
Please call her that she should keep continues regular follow-up with kidney doctor her kidney is stage III to stage IV. Do not take over-the-counter Aleve or ibuprofen. Drink enough water to prevent dehydration and to prevent kidney function worsening. Rest of the labs are within normal range. Continue all other medications.

## 2021-12-17 RX ORDER — ESCITALOPRAM OXALATE 10 MG/1
10 TABLET ORAL DAILY
Qty: 30 TABLET | Refills: 5 | Status: SHIPPED | OUTPATIENT
Start: 2021-12-17 | End: 2022-08-18

## 2021-12-17 RX ORDER — FAMOTIDINE 40 MG/1
40 TABLET, FILM COATED ORAL DAILY
Qty: 30 TABLET | Refills: 5 | Status: SHIPPED | OUTPATIENT
Start: 2021-12-17

## 2021-12-30 ENCOUNTER — TRANSCRIBE ORDER (OUTPATIENT)
Dept: SCHEDULING | Age: 76
End: 2021-12-30

## 2021-12-30 DIAGNOSIS — K74.60 LIVER CIRRHOSIS (HCC): Primary | ICD-10-CM

## 2022-02-02 ENCOUNTER — NURSE NAVIGATOR (OUTPATIENT)
Dept: CASE MANAGEMENT | Age: 77
End: 2022-02-02

## 2022-02-02 NOTE — NURSE NAVIGATOR
[address]  Breast Navigator Encounter    Name: Kamilah Diaz  Age: 78 y.o.  : 1945  Diagnosis: Right breast IDC; ER+/[place]+/HER2-    Encounter type:  [x]Patient Initiated  []Navigator Follow-up []Pre-op  []Post-op  []Check-in Prior to First Treatment []Treatment Modality Change  []Survivorship Transition [x]Other:     Narrative:   Pt called stating that she missed 2 days of Anastrozole and was concerned about this. She states she has the medication and has resumed the prescribed dose. I explained that it is important to take this daily however she won't have any adverse effects from the 2 missed doses. I also encouraged her to follow-up with her medical oncologist as scheduled. She verbalizes understanding.        Interdisciplinary Team:  Med-Onc:  Surg-Onc: Dr. Hyacinth Chen MD  Rad-Onc:  Plastics:  :   Nurse Navigator:  CHARLA Nath, RN, CHARLA Zelaya, RN, VIA [facility]  Oncology Breast Navigator    [address]  [address] [place] [address]  W: [phone]  F: [phone]  [email or web address]   Good Help to Those in [facility]

## 2022-02-09 RX ORDER — SUMATRIPTAN 50 MG/1
TABLET, FILM COATED ORAL
Qty: 9 TABLET | Refills: 2 | Status: SHIPPED | OUTPATIENT
Start: 2022-02-09

## 2022-02-15 ENCOUNTER — HOSPITAL ENCOUNTER (OUTPATIENT)
Dept: ULTRASOUND IMAGING | Age: 77
Discharge: HOME | End: 2022-02-15
Attending: INTERNAL MEDICINE
Payer: MEDICAID

## 2022-02-15 ENCOUNTER — TRANSCRIBE ORDER (OUTPATIENT)
Dept: REGISTRATION | Age: 77
End: 2022-02-15

## 2022-02-15 ENCOUNTER — HOSPITAL ENCOUNTER (OUTPATIENT)
Dept: LAB | Age: 77
Discharge: HOME | End: 2022-02-15
Attending: INTERNAL MEDICINE
Payer: MEDICAID

## 2022-02-15 DIAGNOSIS — K74.60 LIVER CIRRHOSIS (HCC): ICD-10-CM

## 2022-02-15 DIAGNOSIS — K74.60 CIRRHOSIS (HCC): Primary | ICD-10-CM

## 2022-02-15 DIAGNOSIS — K74.60 CIRRHOSIS (HCC): ICD-10-CM

## 2022-02-15 LAB
ALBUMIN SERPL-MCNC: 3.2 G/DL (ref 3.5–5)
ALBUMIN/GLOB SERPL: 0.7 {RATIO} (ref 1.1–2.2)
ALP SERPL-CCNC: 96 U/L (ref 45–117)
ALT SERPL-CCNC: 13 U/L (ref 12–78)
ANION GAP SERPL CALC-SCNC: 3 MMOL/L (ref 5–15)
AST SERPL W P-5'-P-CCNC: 20 U/L (ref 15–37)
BASOPHILS # BLD: 0 K/UL (ref 0–0.1)
BASOPHILS NFR BLD: 0 % (ref 0–1)
BILIRUB SERPL-MCNC: 0.4 MG/DL (ref 0.2–1)
BUN SERPL-MCNC: 16 MG/DL (ref 6–20)
BUN/CREAT SERPL: 10 (ref 12–20)
CA-I BLD-MCNC: 9.8 MG/DL (ref 8.5–10.1)
CHLORIDE SERPL-SCNC: 109 MMOL/L (ref 97–108)
CO2 SERPL-SCNC: 29 MMOL/L (ref 21–32)
CREAT SERPL-MCNC: 1.53 MG/DL (ref 0.55–1.02)
DIFFERENTIAL METHOD BLD: ABNORMAL
EOSINOPHIL # BLD: 0.3 K/UL (ref 0–0.4)
EOSINOPHIL NFR BLD: 5 % (ref 0–7)
ERYTHROCYTE [DISTWIDTH] IN BLOOD BY AUTOMATED COUNT: 14.4 % (ref 11.5–14.5)
GLOBULIN SER CALC-MCNC: 4.3 G/DL (ref 2–4)
GLUCOSE SERPL-MCNC: 89 MG/DL (ref 65–100)
HCT VFR BLD AUTO: 37 % (ref 35–47)
HGB BLD-MCNC: 11.1 G/DL (ref 11.5–16)
IMM GRANULOCYTES # BLD AUTO: 0 K/UL (ref 0–0.04)
IMM GRANULOCYTES NFR BLD AUTO: 0 % (ref 0–0.5)
LYMPHOCYTES # BLD: 1.1 K/UL (ref 0.8–3.5)
LYMPHOCYTES NFR BLD: 21 % (ref 12–49)
MCH RBC QN AUTO: 22.8 PG (ref 26–34)
MCHC RBC AUTO-ENTMCNC: 30 G/DL (ref 30–36.5)
MCV RBC AUTO: 76 FL (ref 80–99)
MONOCYTES # BLD: 0.4 K/UL (ref 0–1)
MONOCYTES NFR BLD: 7 % (ref 5–13)
NEUTS SEG # BLD: 3.5 K/UL (ref 1.8–8)
NEUTS SEG NFR BLD: 67 % (ref 32–75)
NRBC # BLD: 0 K/UL (ref 0–0.01)
NRBC BLD-RTO: 0 PER 100 WBC
PLATELET # BLD AUTO: 251 K/UL (ref 150–400)
PMV BLD AUTO: 9.2 FL (ref 8.9–12.9)
POTASSIUM SERPL-SCNC: 4.3 MMOL/L (ref 3.5–5.1)
PROT SERPL-MCNC: 7.5 G/DL (ref 6.4–8.2)
RBC # BLD AUTO: 4.87 M/UL (ref 3.8–5.2)
SODIUM SERPL-SCNC: 141 MMOL/L (ref 136–145)
WBC # BLD AUTO: 5.3 K/UL (ref 3.6–11)

## 2022-02-15 PROCEDURE — 76705 ECHO EXAM OF ABDOMEN: CPT

## 2022-02-15 PROCEDURE — 80053 COMPREHEN METABOLIC PANEL: CPT

## 2022-02-15 PROCEDURE — 36415 COLL VENOUS BLD VENIPUNCTURE: CPT

## 2022-02-15 PROCEDURE — 85025 COMPLETE CBC W/AUTO DIFF WBC: CPT

## 2022-02-21 PROBLEM — C50.919 BREAST CA (HCC): Status: RESOLVED | Noted: 2021-09-29 | Resolved: 2022-02-21

## 2022-03-03 ENCOUNTER — OFFICE VISIT (OUTPATIENT)
Dept: INTERNAL MEDICINE CLINIC | Age: 77
End: 2022-03-03
Payer: MEDICAID

## 2022-03-03 VITALS
OXYGEN SATURATION: 98 % | TEMPERATURE: 97.8 F | HEIGHT: 60 IN | WEIGHT: 268 LBS | RESPIRATION RATE: 18 BRPM | HEART RATE: 80 BPM | SYSTOLIC BLOOD PRESSURE: 120 MMHG | DIASTOLIC BLOOD PRESSURE: 70 MMHG | BODY MASS INDEX: 52.61 KG/M2

## 2022-03-03 DIAGNOSIS — G89.29 CHRONIC BILATERAL LOW BACK PAIN, UNSPECIFIED WHETHER SCIATICA PRESENT: ICD-10-CM

## 2022-03-03 DIAGNOSIS — K76.9 LIVER DISEASE, CHRONIC, WITH CIRRHOSIS (HCC): ICD-10-CM

## 2022-03-03 DIAGNOSIS — Z86.79 HISTORY OF HYPERTENSION: Primary | ICD-10-CM

## 2022-03-03 DIAGNOSIS — K74.69 OTHER CIRRHOSIS OF LIVER (HCC): ICD-10-CM

## 2022-03-03 DIAGNOSIS — Z87.891 EX-SMOKER: ICD-10-CM

## 2022-03-03 DIAGNOSIS — F41.9 ANXIETY: ICD-10-CM

## 2022-03-03 DIAGNOSIS — I25.2 HISTORY OF HEART ATTACK: ICD-10-CM

## 2022-03-03 DIAGNOSIS — K73.9 CHRONIC HEPATITIS (HCC): ICD-10-CM

## 2022-03-03 DIAGNOSIS — Z85.3 HISTORY OF BREAST CANCER: ICD-10-CM

## 2022-03-03 DIAGNOSIS — N18.32 STAGE 3B CHRONIC KIDNEY DISEASE (HCC): ICD-10-CM

## 2022-03-03 DIAGNOSIS — Z86.59 HISTORY OF MOOD DISORDER: ICD-10-CM

## 2022-03-03 DIAGNOSIS — R05.9 COUGH: ICD-10-CM

## 2022-03-03 DIAGNOSIS — Z79.811 LONG TERM CURRENT USE OF AROMATASE INHIBITOR: ICD-10-CM

## 2022-03-03 DIAGNOSIS — N18.9 ANEMIA DUE TO CHRONIC KIDNEY DISEASE, UNSPECIFIED CKD STAGE: ICD-10-CM

## 2022-03-03 DIAGNOSIS — Z13.220 SCREENING FOR CHOLESTEROL LEVEL: ICD-10-CM

## 2022-03-03 DIAGNOSIS — F10.11 HISTORY OF ALCOHOL ABUSE: ICD-10-CM

## 2022-03-03 DIAGNOSIS — K74.60 LIVER DISEASE, CHRONIC, WITH CIRRHOSIS (HCC): ICD-10-CM

## 2022-03-03 DIAGNOSIS — M54.50 CHRONIC BILATERAL LOW BACK PAIN, UNSPECIFIED WHETHER SCIATICA PRESENT: ICD-10-CM

## 2022-03-03 DIAGNOSIS — F32.A DEPRESSIVE DISORDER: ICD-10-CM

## 2022-03-03 DIAGNOSIS — J44.9 CHRONIC OBSTRUCTIVE PULMONARY DISEASE, UNSPECIFIED COPD TYPE (HCC): ICD-10-CM

## 2022-03-03 DIAGNOSIS — Z86.59 HISTORY OF BIPOLAR DISORDER: ICD-10-CM

## 2022-03-03 DIAGNOSIS — D63.1 ANEMIA DUE TO CHRONIC KIDNEY DISEASE, UNSPECIFIED CKD STAGE: ICD-10-CM

## 2022-03-03 DIAGNOSIS — R51.9 HEADACHE DISORDER: ICD-10-CM

## 2022-03-03 PROCEDURE — 99214 OFFICE O/P EST MOD 30 MIN: CPT | Performed by: INTERNAL MEDICINE

## 2022-03-03 RX ORDER — AMOXICILLIN 500 MG/1
500 CAPSULE ORAL 3 TIMES DAILY
Qty: 21 CAPSULE | Refills: 0 | Status: SHIPPED | OUTPATIENT
Start: 2022-03-03 | End: 2022-06-09 | Stop reason: ALTCHOICE

## 2022-03-03 RX ORDER — MINERAL OIL
180 ENEMA (ML) RECTAL DAILY
Qty: 30 TABLET | Refills: 3 | Status: SHIPPED | OUTPATIENT
Start: 2022-03-03 | End: 2022-08-19 | Stop reason: ALTCHOICE

## 2022-03-03 RX ORDER — DICYCLOMINE HYDROCHLORIDE 10 MG/1
10 CAPSULE ORAL 3 TIMES DAILY
COMMUNITY
Start: 2022-02-09 | End: 2022-06-09 | Stop reason: ALTCHOICE

## 2022-03-03 RX ORDER — BUDESONIDE AND FORMOTEROL FUMARATE DIHYDRATE 160; 4.5 UG/1; UG/1
2 AEROSOL RESPIRATORY (INHALATION) 2 TIMES DAILY
Qty: 10.2 G | Refills: 2 | Status: SHIPPED | OUTPATIENT
Start: 2022-03-03 | End: 2022-08-19 | Stop reason: ALTCHOICE

## 2022-03-03 NOTE — PROGRESS NOTES
Chief Complaint   Patient presents with    Follow Up Chronic Condition         Visit Vitals  /70 (BP 1 Location: Left upper arm, BP Patient Position: Sitting)   Pulse 80   Temp 97.8 °F (36.6 °C) (Temporal)   Resp 18   Ht 5' (1.524 m)   Wt 268 lb (121.6 kg)   SpO2 98%   BMI 52.34 kg/m²           1. Have you been to the ER, urgent care clinic since your last visit? Hospitalized since your last visit? No    2. Have you seen or consulted any other health care providers outside of the [address] [place] [address] since your last visit? Include any pap smears or colon screening.  [facility], 02/28/2022

## 2022-03-03 NOTE — PROGRESS NOTES
Rivka Cm is a 78 y.o. female and presents with Follow Up Chronic Condition    2012,2012  1  Paranoid last weekpsychiatry in [place]  Ms. Bernardino Mccloud came for regular follow-up. In between she had been diagnosed with breast cancer and underwent surgery and getting now anastrozole. Also she follows psychiatrist she has made [place] in [place] with Dr. Clinton Tse, she is already getting escitalopram low-dose and duloxetine having history of anxiety and depression. She quit smoking cigarette in 2012 that she used to smoke 1 pack a day and she quit drinking alcohol since 2012 but she told me that she was not extremely heavy drinker but used to drink on weekends. Now she drinks only when she has birthdate. She told me she was feeling little annoyed with her son who is having some issues. She is not made appointment with cardiologist for cardiac clearance before having dental extraction so I sent to another cardiologist.  She told me that she has seen gastroenterologist Dr. Bhavesh Gates because she used to see at [facility] and she is having history of hepatitis C which is chronic hepatitis C and history of liver cirrhosis on ultrasound and she is going to have follow-up. She follows nephrologist.  Her CKD stage III. She has no chest pain or no nausea vomiting. She complains of cough with white sputum. Started on antibiotic. She is ex-smoker so she takes rescue inhaler started on maintenance inhaler and also started on amoxicillin. Her BMI is 52.34. She has multiple comorbidities. She has headache disorder she had consulted neurologist in the past she  Recommended not to take benty. If not needed     Review of Systems    Review of Systems   Constitutional: Negative. HENT: Negative for sinus pain and sore throat. Eyes: Negative for blurred vision. Respiratory: Positive for cough and sputum production. Cardiovascular: Negative. Gastrointestinal: Negative. Genitourinary: Negative. Musculoskeletal: Negative. DJD   Neurological: Negative for dizziness and headaches. Psychiatric/Behavioral: Negative.          H/o bipolar and depression stable        Past Medical History:   Diagnosis Date    Anemia     Asthma 2020    Bipolar 1 disorder ([facility] 2020    Breast CA ([facility] 2021    Chronic hepatitis ([facility] 2020    Chronic kidney disease (CKD), stage II (mild) 2020    Cirrhosis of liver ([facility] 2020    [facility]    Depressive disorder 2020    Edema 2020    Headache disorder 2021    History of mood disorder 2020    Hypertension     Lumbar radiculopathy 2020    [facility]    Osteoarthritis 2020    Pain, knee 2020    Pure hypercholesterolemia with target low density lipoprotein (LDL) cholesterol less than 130 mg/dL 2020    RUQ abdominal pain 2020    Sleep apnea 2020    Vitamin D deficiency 2020     Past Surgical History:   Procedure Laterality Date    HX BREAST BIOPSY Right     benign    HX BREAST LUMPECTOMY Right 2021    RIGHT PARTIAL MASTECT[facility] by [facility] HX COLONOSCOPY      HX COLONOSCOPY      HX CYST INCISION AND DRAINAGE      HX GYN      HX TOTAL ABDOMINAL HYSTERECTOMY      IR [address] LIVER NEEDLE W OTHER [address]       Social History     Socioeconomic History    Marital status:    Tobacco Use    Smoking status: Former Smoker     Packs/day: 1.00     Years: 30.00     Pack years: 30.00     Quit date:      Years since quittin.1    Smokeless tobacco: Never Used   Vaping Use    Vaping Use: Never used   Substance and Sexual Activity    Alcohol use: Not Currently     Comment: on birthday last year    Drug use: Never    Sexual activity: Not Currently     Family History   Problem Relation Age of Onset    Hypertension Mother    Yandel Rehman Hypertension Brother     Heart Disease Brother     Hypertension Maternal Grandmother      Current Outpatient Medications   Medication Sig Dispense Refill    dicyclomine (BENTYL) 10 mg capsule Take 10 mg by mouth three (3) times daily.  amoxicillin (AMOXIL) 500 mg capsule Take 1 Capsule by mouth three (3) times daily. Take with food. 21 Capsule 0    budesonide-formoteroL (SYMBICORT) 160-4.5 mcg/actuation HFAA Take 2 Puffs by inhalation two (2) times a day. 10.2 g 2    fexofenadine (ALLEGRA) 180 mg tablet Take 1 Tablet by mouth daily. 30 Tablet 3    SUMAtriptan (IMITREX) 50 mg tablet TAKE 1 TABLET BY MOUTH ONCE AS NEEDED FOR MIGRAINE FOR UP TO 1 DOSE. 9 Tablet 2    DULoxetine (CYMBALTA) 30 mg capsule TAKE 1 CAPSULE BY MOUTH TWICE A DAY 60 Capsule 5    pantoprazole (PROTONIX) 40 mg tablet TAKE 1 TABLET BY MOUTH TWICE A DAY BEFORE BREAKFAST AND DINNER 30 Tablet 5    famotidine (PEPCID) 40 mg tablet Take 1 Tablet by mouth daily. 30 Tablet 5    escitalopram oxalate (LEXAPRO) 10 mg tablet Take 1 Tablet by mouth daily. 30 Tablet 5    anastrozole (ARIMIDEX) 1 mg tablet Take 1 mg by mouth daily.  ferrous sulfate 325 mg (65 mg iron) tablet Take 1 Tablet by mouth Daily (before breakfast). 30 Tablet 5    albuterol (PROVENTIL VENTOLIN) 2.5 mg /3 mL (0.083 %) nebu USE 1 VIAL IN NEBULIZER EVERY 4-6 HOURS AS NEEDED 375 mL 11    ProAir HFA 90 mcg/actuation inhaler INHALE 2 PUFFS BY MOUTH EVERY 4 HOURS AS NEEDED FOR WHEEZE 25.5 Inhaler 5    aspirin delayed-release 81 mg tablet Take 1 Tab by mouth daily.  fluticasone propionate (FLONASE) 50 mcg/actuation nasal spray 2 Sprays by Both Nostrils route daily. 1 Bottle 5    diclofenac (VOLTAREN) 1 % gel Apply 2 g to affected area two (2) times a day.  latanoprost (XALATAN) 0.005 % ophthalmic solution Administer 1 Drop to both eyes daily.  cyanocobalamin (VITAMIN B12) 500 mcg tablet Take 1 Tab by mouth daily.       cholecalciferol (VITAMIN D3) (1000 Units /25 mcg) tablet Take 1 Tab by mouth daily.  calcium-cholecalciferol, D3, (CALTRATE 600+D) tablet Take 1 Tab by mouth daily. No Known Allergies    Objective:  Visit Vitals  /70 (BP 1 Location: Left upper arm, BP Patient Position: Sitting, BP Cuff Size: Large adult)   Pulse 80   Temp 97.8 °F (36.6 °C) (Temporal)   Resp 18   Ht 5' (1.524 m)   Wt 268 lb (121.6 kg)   SpO2 98%   BMI 52.34 kg/m²       Physical Exam:   Constitutional: General Appearance: Morbid obesity. Level of Distress: NAD. Psychiatric: Mental Status: normal mood and affect Orientation: to time, place, and person. ,normal eye contact. Poor insight/pleasant personality  Head: Head: normocephalic and atraumatic. Eyes: Pupils: PERRLA. Sclerae: non-icteric. Neck: Neck: supple, trachea midline, and no masses. Lymph Nodes: no cervical LAD. Thyroid: no enlargement or nodules and non-tender. Lungs: Respiratory effort: no dyspnea. Auscultation: no wheezing, rales/crackles, or rhonchi and breath sounds normal and good air movement. Cardiovascular: Apical Impulse: not displaced. Heart Auscultation: normal S1 and S2; no murmurs, rubs, or gallops; and RRR. Neck vessels: no carotid bruits. Pulses including femoral / pedal: normal throughout. Abdomen: Bowel Sounds: normal. Inspection and Palpation: no tenderness, guarding, or masses and soft and non-distended. Liver: non-tender and no hepatomegaly. Spleen: non-tender and no splenomegaly. Musculoskeletal[de-identified] Extremities: no edema,no varicosities. No Calf tenderness. Neurologic: Gait and Station: normal gait and station. Motor Strength normal right and left. Skin: Inspection and palpation: no rash, lesions, or ulcer.        Results for orders placed or performed during the hospital encounter of 15/42/93   METABOLIC PANEL, COMPREHENSIVE   Result Value Ref Range    Sodium 141 136 - 145 mmol/L    Potassium 4.3 3.5 - 5.1 mmol/L    Chloride 109 (H) 97 - 108 mmol/L    CO2 29 21 - 32 mmol/L Anion gap 3 (L) 5 - 15 mmol/L    Glucose 89 65 - 100 mg/dL    BUN 16 6 - 20 mg/dL    Creatinine 1.53 (H) 0.55 - 1.02 mg/dL    BUN/Creatinine ratio 10 (L) 12 - 20      GFR est AA 40 (L) >60 ml/min/1.73m2    GFR est non-AA 33 (L) >60 ml/min/1.73m2    Calcium 9.8 8.5 - 10.1 mg/dL    Bilirubin, total 0.4 0.2 - 1.0 mg/dL    AST (SGOT) 20 15 - 37 U/L    ALT (SGPT) 13 12 - 78 U/L    Alk. phosphatase 96 45 - 117 U/L    Protein, total 7.5 6.4 - 8.2 g/dL    Albumin 3.2 (L) 3.5 - 5.0 g/dL    Globulin 4.3 (H) 2.0 - 4.0 g/dL    A-G Ratio 0.7 (L) 1.1 - 2.2     CBC WITH AUTOMATED DIFF   Result Value Ref Range    WBC 5.3 3.6 - 11.0 K/uL    RBC 4.87 3.80 - 5.20 M/uL    HGB 11.1 (L) 11.5 - 16.0 g/dL    HCT 37.0 35.0 - 47.0 %    MCV 76.0 (L) 80.0 - 99.0 FL    MCH 22.8 (L) 26.0 - 34.0 PG    MCHC 30.0 30.0 - 36.5 g/dL    RDW 14.4 11.5 - 14.5 %    PLATELET 706 984 - 152 K/uL    MPV 9.2 8.9 - 12.9 FL    NRBC 0.0 0.0  WBC    ABSOLUTE NRBC 0.00 0.00 - 0.01 K/uL    NEUTROPHILS 67 32 - 75 %    LYMPHOCYTES 21 12 - 49 %    MONOCYTES 7 5 - 13 %    EOSINOPHILS 5 0 - 7 %    BASOPHILS 0 0 - 1 %    IMMATURE GRANULOCYTES 0 0 - 0.5 %    ABS. NEUTROPHILS 3.5 1.8 - 8.0 K/UL    ABS. LYMPHOCYTES 1.1 0.8 - 3.5 K/UL    ABS. MONOCYTES 0.4 0.0 - 1.0 K/UL    ABS. EOSINOPHILS 0.3 0.0 - 0.4 K/UL    ABS. BASOPHILS 0.0 0.0 - 0.1 K/UL    ABS. IMM. GRANS. 0.0 0.00 - 0.04 K/UL    DF AUTOMATED         Assessment/Plan:      ICD-10-CM ICD-9-CM    1. History of hypertension  Z86.79 V12.59    2. Anxiety  F41.9 300.00    3. Cough  R05.9 786.2    4. Stage 3b chronic kidney disease (HCC)  N18.32 585.3 VITAMIN D, 25 HYDROXY      REFERRAL TO CARDIOLOGY   5. Depressive disorder  F32.9 311    6. Liver disease, chronic, with cirrhosis (HCC)  K74.60 571.9     K76.9 571.5    7. Anemia due to chronic kidney disease, unspecified CKD stage  N18.9 285.21     D63.1     8. Chronic hepatitis ([facility]  K73.9 571.40 REFERRAL TO CARDIOLOGY   9.  BMI 50.0-59.9, adult [facility])  C13.75 V85.43    10. Chronic obstructive pulmonary disease, unspecified COPD type (HCC)  J44.9 496 REFERRAL TO CARDIOLOGY   11. Chronic bilateral low back pain, unspecified whether sciatica present  M54.50 724.2     G89.29 338.29    12. Other cirrhosis of liver (HCC)  K74.69 571.5 REFERRAL TO CARDIOLOGY   13. Headache disorder  R51.9 784.0    14. Ex-smoker  Z87.891 V15.82    15. History of bipolar disorder  Z86.59 V11.1    16. History of mood disorder  Z86.59 V11.1    17. History of breast cancer  Z85.3 V10.3    18. History of heart attack  I25.2 412 REFERRAL TO CARDIOLOGY   19. History of alcohol abuse  F10.11 305.03 REFERRAL TO CARDIOLOGY   20. Screening for cholesterol level  Z13.220 V77.91 LIPID PANEL   21. Long term current use of aromatase inhibitor  Z79.811 V07.52      Orders Placed This Encounter    LIPID PANEL    VITAMIN D, 25 HYDROXY    REFERRAL TO CARDIOLOGY     Referral Priority:   Routine     Referral Type:   Consultation     Referral Reason:   Specialty Services Required     Referred to Provider:   José Miguel Francisco MD     Number of Visits Requested:   1    dicyclomine (BENTYL) 10 mg capsule     Sig: Take 10 mg by mouth three (3) times daily.  amoxicillin (AMOXIL) 500 mg capsule     Sig: Take 1 Capsule by mouth three (3) times daily. Take with food. Dispense:  21 Capsule     Refill:  0    budesonide-formoteroL (SYMBICORT) 160-4.5 mcg/actuation HFAA     Sig: Take 2 Puffs by inhalation two (2) times a day. Dispense:  10.2 g     Refill:  2    fexofenadine (ALLEGRA) 180 mg tablet     Sig: Take 1 Tablet by mouth daily. Dispense:  30 Tablet     Refill:  3       Anxiety with history of depression history of bipolar disorder she is getting duloxetine and low-dose of escitalopram and she has made appointment with psychiatrist Karyle Manning in [place].     Cough with white sputum she is ex-smoker started on amoxicillin continued on rescue inhaler started on maintenance inhaler Symbicort that she has to gargle her mouth after spraying Symbicort in the mouth to prevent thrush. Stay 3 weeks CKD recommended not to take over-the-counter ibuprofen Aleve without my knowledge and avoid NSAIDs and avoid dehydration. COPD due to ex-smoker quit smoking since 2012. Cirrhosis of liver with history of chronic hepatitis and history of alcohol abuse before 2012 she has started seeing Dr. Liya James here used to see hematologist at [facility] and at [place]. Recently underwent ultrasound of the liver. Headache disorder takes sumatriptan as needed. Blood pressure is controlled without on antihypertensive medicine. Anemia most likely due to anemia due to chronic disease recently her hemoglobin is now stabilized continue vitamin U47 folic acid and iron. Screening cholesterol ordered. Her hypertension is now controlled without medication so we will keep close monitoring. History of  breast cancer recently underwent surgery for removing carcinoma in situ. Started on anastrozole by medical oncologist.  Follow the recommendation. She is under care of Dr. Emanuel Maguire imbalance date of surgery  Follow-up in 3 months. Labs ordered. I reviewed the note she had invasive ductal carcinoma of the right breast and she wanted breast conservation so elective surgery was done for right breast partial mastectomyIt was done in September 2021 it was right localized partial mastectomy. She was ER/VA positive and HER-2 negative and she follows Dr. Sav Medina and she is getting aromatase inhibitor therapy. She could not remember the name of medical oncologist.  History of myocardial infarction she does not recall anything she used to see previous PCP ? ?  I have referred to cardiologist for cardiac work-up      Referrals given. Follow-up in 3 months. Labs ordered. ,  Refills given.     lose weight, increase physical activity, follow low fat diet, follow low salt diet, continue present plan, routine labs ordered, call if any problems    There are no Patient Instructions on file for this visit. Follow-up and Dispositions    · Return in about 3 months (around 6/3/2022) for follow up for chronic condition.

## 2022-03-07 RX ORDER — ALBUTEROL SULFATE 90 UG/1
AEROSOL, METERED RESPIRATORY (INHALATION)
Qty: 25.5 EACH | Refills: 5 | Status: SHIPPED | OUTPATIENT
Start: 2022-03-07 | End: 2022-08-19 | Stop reason: ALTCHOICE

## 2022-03-18 PROBLEM — Z13.220 SCREENING FOR CHOLESTEROL LEVEL: Status: ACTIVE | Noted: 2020-10-28

## 2022-03-18 PROBLEM — R10.33 PERIUMBILICAL ABDOMINAL PAIN: Status: ACTIVE | Noted: 2020-12-03

## 2022-03-18 PROBLEM — F43.21 SITUATIONAL DEPRESSION: Status: ACTIVE | Noted: 2021-01-27

## 2022-03-18 PROBLEM — M19.90 OSTEOARTHRITIS: Status: ACTIVE | Noted: 2020-08-21

## 2022-03-18 PROBLEM — Z87.19 HISTORY OF ESOPHAGEAL VARICES: Status: ACTIVE | Noted: 2021-05-19

## 2022-03-18 PROBLEM — K74.60 CIRRHOSIS OF LIVER (HCC): Status: ACTIVE | Noted: 2020-08-21

## 2022-03-18 PROBLEM — G47.30 SLEEP APNEA: Status: ACTIVE | Noted: 2020-08-21

## 2022-03-18 PROBLEM — K73.9 CHRONIC HEPATITIS (HCC): Status: ACTIVE | Noted: 2020-08-21

## 2022-03-18 PROBLEM — N18.32 STAGE 3B CHRONIC KIDNEY DISEASE (HCC): Status: ACTIVE | Noted: 2021-05-19

## 2022-03-18 PROBLEM — E78.00 PURE HYPERCHOLESTEROLEMIA WITH TARGET LOW DENSITY LIPOPROTEIN (LDL) CHOLESTEROL LESS THAN 130 MG/DL: Status: ACTIVE | Noted: 2020-08-21

## 2022-03-19 PROBLEM — R51.9 HEADACHE DISORDER: Status: ACTIVE | Noted: 2021-05-16

## 2022-03-19 PROBLEM — Z86.19 HISTORY OF HEPATITIS C: Status: ACTIVE | Noted: 2020-10-28

## 2022-03-19 PROBLEM — R05.9 COUGH: Status: ACTIVE | Noted: 2022-03-03

## 2022-03-19 PROBLEM — F10.11 HISTORY OF ALCOHOL ABUSE: Status: ACTIVE | Noted: 2022-03-03

## 2022-03-19 PROBLEM — R60.9 EDEMA: Status: ACTIVE | Noted: 2020-08-21

## 2022-03-19 PROBLEM — M54.16 LUMBAR RADICULOPATHY: Status: ACTIVE | Noted: 2020-08-21

## 2022-03-19 PROBLEM — M54.50 CHRONIC BILATERAL LOW BACK PAIN, UNSPECIFIED WHETHER SCIATICA PRESENT: Status: ACTIVE | Noted: 2021-01-27

## 2022-03-19 PROBLEM — Z79.811 LONG TERM CURRENT USE OF AROMATASE INHIBITOR: Status: ACTIVE | Noted: 2022-03-03

## 2022-03-19 PROBLEM — K76.9 LIVER DISEASE, CHRONIC, WITH CIRRHOSIS (HCC): Status: ACTIVE | Noted: 2021-05-19

## 2022-03-19 PROBLEM — M25.569 PAIN, KNEE: Status: ACTIVE | Noted: 2020-08-21

## 2022-03-19 PROBLEM — R10.11 RUQ ABDOMINAL PAIN: Status: ACTIVE | Noted: 2020-08-21

## 2022-03-19 PROBLEM — F32.A DEPRESSIVE DISORDER: Status: ACTIVE | Noted: 2020-08-21

## 2022-03-19 PROBLEM — R79.89 INCREASE IN SERUM CREATININE FROM PRIOR MEASUREMENT: Status: ACTIVE | Noted: 2021-05-16

## 2022-03-19 PROBLEM — I10 ESSENTIAL HYPERTENSION: Status: ACTIVE | Noted: 2020-10-28

## 2022-03-19 PROBLEM — Z86.79 HISTORY OF HYPERTENSION: Status: ACTIVE | Noted: 2022-03-03

## 2022-03-19 PROBLEM — D64.9 ANEMIA: Status: ACTIVE | Noted: 2020-08-21

## 2022-03-19 PROBLEM — K21.9 GASTROESOPHAGEAL REFLUX DISEASE WITHOUT ESOPHAGITIS: Status: ACTIVE | Noted: 2021-12-02

## 2022-03-19 PROBLEM — J44.9 COPD (CHRONIC OBSTRUCTIVE PULMONARY DISEASE) (HCC): Status: ACTIVE | Noted: 2020-08-21

## 2022-03-19 PROBLEM — K74.60 LIVER DISEASE, CHRONIC, WITH CIRRHOSIS (HCC): Status: ACTIVE | Noted: 2021-05-19

## 2022-03-19 PROBLEM — G89.29 CHRONIC BILATERAL LOW BACK PAIN, UNSPECIFIED WHETHER SCIATICA PRESENT: Status: ACTIVE | Noted: 2021-01-27

## 2022-03-19 PROBLEM — E55.9 VITAMIN D DEFICIENCY: Status: ACTIVE | Noted: 2020-08-21

## 2022-03-19 PROBLEM — E66.01 MORBID OBESITY (HCC): Status: ACTIVE | Noted: 2020-08-21

## 2022-03-19 PROBLEM — E83.52 HYPERCALCEMIA: Status: ACTIVE | Noted: 2020-12-04

## 2022-03-19 PROBLEM — Z85.3 HISTORY OF BREAST CANCER: Status: ACTIVE | Noted: 2021-12-02

## 2022-03-20 PROBLEM — J45.909 ASTHMA: Status: ACTIVE | Noted: 2020-08-21

## 2022-03-20 PROBLEM — R19.8 STRAINING DURING BOWEL MOVEMENTS: Status: ACTIVE | Noted: 2020-12-03

## 2022-03-20 PROBLEM — N18.9 ANEMIA DUE TO CHRONIC KIDNEY DISEASE, UNSPECIFIED CKD STAGE: Status: ACTIVE | Noted: 2021-12-02

## 2022-03-20 PROBLEM — F41.9 ANXIETY: Status: ACTIVE | Noted: 2021-12-02

## 2022-03-20 PROBLEM — Z87.891 EX-SMOKER: Status: ACTIVE | Noted: 2022-03-03

## 2022-03-20 PROBLEM — I25.2 HISTORY OF HEART ATTACK: Status: ACTIVE | Noted: 2020-10-28

## 2022-03-20 PROBLEM — Z86.59 HISTORY OF BIPOLAR DISORDER: Status: ACTIVE | Noted: 2020-08-21

## 2022-03-20 PROBLEM — D63.1 ANEMIA DUE TO CHRONIC KIDNEY DISEASE, UNSPECIFIED CKD STAGE: Status: ACTIVE | Noted: 2021-12-02

## 2022-04-02 PROBLEM — Z13.220 SCREENING FOR CHOLESTEROL LEVEL: Status: RESOLVED | Noted: 2020-10-28 | Resolved: 2022-04-02

## 2022-04-02 PROBLEM — R05.9 COUGH: Status: RESOLVED | Noted: 2022-03-03 | Resolved: 2022-04-02

## 2022-05-13 ENCOUNTER — TELEPHONE (OUTPATIENT)
Dept: INTERNAL MEDICINE CLINIC | Age: 77
End: 2022-05-13

## 2022-05-13 NOTE — TELEPHONE ENCOUNTER
Pt called with c/o constipation since last night, she states that she had a bowel movement but it was hard, she has taken a laxative but doesn't seem to have helped. She would like to know what to do. If you send anything to the pharmacy it is now [facility].

## 2022-05-16 RX ORDER — POLYETHYLENE GLYCOL 3350 17 G/17G
17 POWDER, FOR SOLUTION ORAL
Qty: 30 EACH | Refills: 1 | Status: SHIPPED | OUTPATIENT
Start: 2022-05-16 | End: 2022-08-19 | Stop reason: ALTCHOICE

## 2022-05-16 NOTE — TELEPHONE ENCOUNTER
Patient not sure what to take because of Kidney Disease, was taking Citroma but it says contact prescriber.

## 2022-05-23 ENCOUNTER — TELEPHONE (OUTPATIENT)
Dept: INTERNAL MEDICINE CLINIC | Age: 77
End: 2022-05-23

## 2022-05-23 NOTE — TELEPHONE ENCOUNTER
[address] faxed refill request for the following medications:      Diclofenac Sodium 1% Gel  QTY: 100 GM  Apply to affected area twice daily      Pantoprazole SOD DR 40 MG  QTY: 30 TAB  Take 1 tablet by mouth daily      Fluticasone PROP 50 MCG    QTY: 16 GM  Instill 2 sprays in each nostril twice daily          LOV 3-3-2022  NOV 6-9-2022

## 2022-05-24 RX ORDER — FLUTICASONE PROPIONATE 50 MCG
2 SPRAY, SUSPENSION (ML) NASAL DAILY
Qty: 1 EACH | Refills: 5 | Status: SHIPPED | OUTPATIENT
Start: 2022-05-24

## 2022-05-24 RX ORDER — PANTOPRAZOLE SODIUM 40 MG/1
TABLET, DELAYED RELEASE ORAL
Qty: 30 TABLET | Refills: 5 | Status: SHIPPED | OUTPATIENT
Start: 2022-05-24

## 2022-05-24 RX ORDER — DICLOFENAC SODIUM 10 MG/G
2 GEL TOPICAL 2 TIMES DAILY
Qty: 450 G | Refills: 2 | Status: SHIPPED | OUTPATIENT
Start: 2022-05-24 | End: 2022-08-19 | Stop reason: ALTCHOICE

## 2022-05-24 RX ORDER — LATANOPROST 50 UG/ML
1 SOLUTION/ DROPS OPHTHALMIC DAILY
OUTPATIENT
Start: 2022-05-24

## 2022-05-30 PROBLEM — E55.9 VITAMIN D DEFICIENCY: Status: RESOLVED | Noted: 2020-08-21 | Resolved: 2022-05-30

## 2022-05-30 PROBLEM — R10.33 PERIUMBILICAL ABDOMINAL PAIN: Status: RESOLVED | Noted: 2020-12-03 | Resolved: 2022-05-30

## 2022-05-30 PROBLEM — M25.569 PAIN, KNEE: Status: RESOLVED | Noted: 2020-08-21 | Resolved: 2022-05-30

## 2022-05-30 PROBLEM — N18.30 CHRONIC RENAL DISEASE, STAGE III (HCC): Status: RESOLVED | Noted: 2022-05-30 | Resolved: 2022-05-30

## 2022-05-30 PROBLEM — R79.89 INCREASE IN SERUM CREATININE FROM PRIOR MEASUREMENT: Status: RESOLVED | Noted: 2021-05-16 | Resolved: 2022-05-30

## 2022-05-30 PROBLEM — E78.00 PURE HYPERCHOLESTEROLEMIA WITH TARGET LOW DENSITY LIPOPROTEIN (LDL) CHOLESTEROL LESS THAN 130 MG/DL: Status: RESOLVED | Noted: 2020-08-21 | Resolved: 2022-05-30

## 2022-05-30 PROBLEM — N18.4 CHRONIC KIDNEY DISEASE, STAGE IV (SEVERE) (HCC): Status: RESOLVED | Noted: 2022-05-30 | Resolved: 2022-05-30

## 2022-05-30 PROBLEM — R19.8 STRAINING DURING BOWEL MOVEMENTS: Status: RESOLVED | Noted: 2020-12-03 | Resolved: 2022-05-30

## 2022-05-30 PROBLEM — R10.11 RUQ ABDOMINAL PAIN: Status: RESOLVED | Noted: 2020-08-21 | Resolved: 2022-05-30

## 2022-05-30 PROBLEM — R05.9 COUGH: Status: RESOLVED | Noted: 2022-03-03 | Resolved: 2022-05-30

## 2022-05-30 PROBLEM — R60.9 EDEMA: Status: RESOLVED | Noted: 2020-08-21 | Resolved: 2022-05-30

## 2022-05-30 PROBLEM — Z12.2 SCREENING FOR LUNG CANCER: Status: ACTIVE | Noted: 2020-10-28

## 2022-05-30 PROBLEM — Z86.79 HISTORY OF HYPERTENSION: Status: RESOLVED | Noted: 2022-03-03 | Resolved: 2022-05-30

## 2022-05-30 PROBLEM — N95.9 MENOPAUSAL AND POSTMENOPAUSAL DISORDER: Status: ACTIVE | Noted: 2022-05-30

## 2022-05-30 PROBLEM — E83.52 HYPERCALCEMIA: Status: RESOLVED | Noted: 2020-12-04 | Resolved: 2022-05-30

## 2022-05-30 PROBLEM — N18.4 CHRONIC KIDNEY DISEASE, STAGE IV (SEVERE) (HCC): Status: ACTIVE | Noted: 2022-05-30

## 2022-05-30 PROBLEM — N18.30 CHRONIC RENAL DISEASE, STAGE III (HCC): Status: ACTIVE | Noted: 2022-05-30

## 2022-05-30 PROBLEM — F43.21 SITUATIONAL DEPRESSION: Status: RESOLVED | Noted: 2021-01-27 | Resolved: 2022-05-30

## 2022-06-09 ENCOUNTER — OFFICE VISIT (OUTPATIENT)
Dept: INTERNAL MEDICINE CLINIC | Age: 77
End: 2022-06-09
Payer: MEDICAID

## 2022-06-09 VITALS
RESPIRATION RATE: 20 BRPM | BODY MASS INDEX: 53.44 KG/M2 | HEIGHT: 60 IN | OXYGEN SATURATION: 98 % | HEART RATE: 74 BPM | DIASTOLIC BLOOD PRESSURE: 59 MMHG | SYSTOLIC BLOOD PRESSURE: 108 MMHG | WEIGHT: 272.2 LBS | TEMPERATURE: 97.7 F

## 2022-06-09 DIAGNOSIS — Z12.2 SCREENING FOR LUNG CANCER: ICD-10-CM

## 2022-06-09 DIAGNOSIS — Z87.891 EX-SMOKER: ICD-10-CM

## 2022-06-09 DIAGNOSIS — Z86.59 HISTORY OF BIPOLAR DISORDER: ICD-10-CM

## 2022-06-09 DIAGNOSIS — K74.60 LIVER DISEASE, CHRONIC, WITH CIRRHOSIS (HCC): ICD-10-CM

## 2022-06-09 DIAGNOSIS — Z85.3 HISTORY OF BREAST CANCER: ICD-10-CM

## 2022-06-09 DIAGNOSIS — M54.50 CHRONIC BILATERAL LOW BACK PAIN, UNSPECIFIED WHETHER SCIATICA PRESENT: ICD-10-CM

## 2022-06-09 DIAGNOSIS — K21.9 GASTROESOPHAGEAL REFLUX DISEASE WITHOUT ESOPHAGITIS: ICD-10-CM

## 2022-06-09 DIAGNOSIS — F32.A DEPRESSIVE DISORDER: ICD-10-CM

## 2022-06-09 DIAGNOSIS — F10.11 HISTORY OF ALCOHOL ABUSE: ICD-10-CM

## 2022-06-09 DIAGNOSIS — I25.2 HISTORY OF HEART ATTACK: ICD-10-CM

## 2022-06-09 DIAGNOSIS — N18.32 STAGE 3B CHRONIC KIDNEY DISEASE (HCC): ICD-10-CM

## 2022-06-09 DIAGNOSIS — K74.69 OTHER CIRRHOSIS OF LIVER (HCC): ICD-10-CM

## 2022-06-09 DIAGNOSIS — Z13.220 SCREENING FOR LIPID DISORDERS: ICD-10-CM

## 2022-06-09 DIAGNOSIS — M17.0 PRIMARY OSTEOARTHRITIS OF BOTH KNEES: ICD-10-CM

## 2022-06-09 DIAGNOSIS — J44.9 CHRONIC OBSTRUCTIVE PULMONARY DISEASE, UNSPECIFIED COPD TYPE (HCC): ICD-10-CM

## 2022-06-09 DIAGNOSIS — Z86.19 HISTORY OF HEPATITIS C: ICD-10-CM

## 2022-06-09 DIAGNOSIS — F41.9 ANXIETY: ICD-10-CM

## 2022-06-09 DIAGNOSIS — G89.29 CHRONIC BILATERAL LOW BACK PAIN, UNSPECIFIED WHETHER SCIATICA PRESENT: ICD-10-CM

## 2022-06-09 DIAGNOSIS — R51.9 HEADACHE DISORDER: ICD-10-CM

## 2022-06-09 DIAGNOSIS — E66.01 MORBID OBESITY (HCC): ICD-10-CM

## 2022-06-09 DIAGNOSIS — I10 ESSENTIAL HYPERTENSION: ICD-10-CM

## 2022-06-09 DIAGNOSIS — K76.9 LIVER DISEASE, CHRONIC, WITH CIRRHOSIS (HCC): ICD-10-CM

## 2022-06-09 DIAGNOSIS — N95.9 MENOPAUSAL AND POSTMENOPAUSAL DISORDER: ICD-10-CM

## 2022-06-09 PROCEDURE — 99214 OFFICE O/P EST MOD 30 MIN: CPT | Performed by: INTERNAL MEDICINE

## 2022-06-09 RX ORDER — DOCUSATE SODIUM 100 MG/1
100 CAPSULE, LIQUID FILLED ORAL 2 TIMES DAILY
COMMUNITY

## 2022-06-09 RX ORDER — TOPIRAMATE 25 MG/1
TABLET ORAL 2 TIMES DAILY
COMMUNITY

## 2022-06-09 NOTE — PROGRESS NOTES
Regi Box is a 78 y.o. female and presents with Follow Up Chronic Condition and Hypertension    Ms. Madai Hackett came with her pleasant granddaughter Ms. Aaron Rivera and I appreciated. Ms. Madai Hackett has multiple complex comorbidities. I have done detailed PHQ-9 screening and screen for any suicidal risk and she says that only concerned that she has that she has bilateral knee pain and she used to see at [address] [facility] and she has not seen for last 2 years and her knee is bothering her a lot she is already on citalopram 10 mg once a day and duloxetine 30 mg twice a day. Explained to her granddaughter she is a known case of chronic hepatitis C with history of morbid obesity with BMI 53.16 so it is very challenging for orthopedic surgeon and insurance may not approve for knee replacement surgery so she can get steroid injection for temporary relief and she needs to lose weight and she is eating a lot of unhealthy diet with high calorie diet and she has to go to 1200-calorie diet plan and she can make appointment with [facility] weight loss clinic with virtual visit. She walks with cane and she agreed and she said she is not that depressed or she does not have suicidal thoughts no homicidal thoughts. Her blood pressure is well controlled with current medication regimen. She has headache that is well controlled taking Topamax and sumatriptan as needed. She has a history of alcohol abuse and history of pack smoking that she quit smoking since 2014 and used to smoke 1 pack a day almost for last 30 years and she agreed and wants to do low-dose lung CT scan to screen lung cancer. Agrees to do a bone density to screen osteoporosis. She has CKD stage IIIb and follows nephrologist.  She does not take any OTC or prescription NSAIDs. She has no negative thoughts. She is a breast cancer survivor follows oncologist and getting anastrozole.     She follows psychiatristDr terrazas at [place] and discussed with [place] card to make appointment.         Review of Systems    ROS     Past Medical History:   Diagnosis Date    Anemia     Asthma 2020    Bipolar 1 disorder ([facility] 2020    Breast CA ([facility] 2021    Chronic hepatitis ([facility] 2020    Chronic kidney disease (CKD), stage II (mild) 2020    Cirrhosis of liver ([facility] 2020    COPD (chronic obstructive pulmonary disease) ([facility] 2020    Depressive disorder 2020    Edema 2020    Headache disorder 2021    History of mood disorder 2020    Hypertension     Lumbar radiculopathy 2020    Morbid obesity ([facility] 2020    Osteoarthritis 2020    Pain, knee 2020    Pure hypercholesterolemia with target low density lipoprotein (LDL) cholesterol less than 130 mg/dL 2020    RUQ abdominal pain 2020    Sleep apnea 2020    Vitamin D deficiency 2020     Past Surgical History:   Procedure Laterality Date    HX BREAST BIOPSY Right     benign    HX BREAST LUMPECTOMY Right 2021    RIGHT PARTIAL MASTECTOMY performed by Nahomi Siddiqui MD at [address] [facility] HX COLONOSCOPY      HX COLONOSCOPY      HX CYST INCISION AND DRAINAGE      HX GYN      HX TOTAL ABDOMINAL HYSTERECTOMY      IR [address] LIVER NEEDLE W OTHER [address]       Social History     Socioeconomic History    Marital status:    Tobacco Use    Smoking status: Former Smoker     Packs/day: 1.00     Years: 30.00     Pack years: 30.00     Quit date:      Years since quittin.4    Smokeless tobacco: Never Used   Vaping Use    Vaping Use: Never used   Substance and Sexual Activity    Alcohol use: Not Currently     Comment: on birthday last year    Drug use: Never    Sexual activity: Not Currently     Social Determinants of Health     Financial Resource Strain: Low Risk     Difficulty of Paying Living Expenses: Not hard at all   Food Insecurity: No Food Insecurity    Worried About Running Out of Food in the Last Year: Never true    Ran Out of Food in the Last Year: Never true     Family History   Problem Relation Age of Onset    Hypertension Mother     Hypertension Brother     Heart Disease Brother     Hypertension Maternal Grandmother      Current Outpatient Medications   Medication Sig Dispense Refill    docusate sodium (COLACE) 100 mg capsule Take 100 mg by mouth two (2) times a day.  topiramate (TOPAMAX) 25 mg tablet Take  by mouth two (2) times a day.  diclofenac (VOLTAREN) 1 % gel Apply 2 g to affected area two (2) times a day. 450 g 2    fluticasone propionate (FLONASE) 50 mcg/actuation nasal spray 2 Sprays by Both Nostrils route daily. 1 Each 5    pantoprazole (PROTONIX) 40 mg tablet TAKE 1 TABLET BY MOUTH TWICE A DAY BEFORE BREAKFAST AND DINNER 30 Tablet 5    polyethylene glycol (MIRALAX) 17 gram packet Take 1 Packet by mouth daily as needed for Constipation. 30 Each 1    triamcinolone acetonide (KENALOG) 0.1 % ointment APPLY TO AFFECTED AREA 2 TIMES A DAY. USE TWICE A DAY FOR 1 WEEK THEN AS NEEDED FOR THE RASH ON THE TRUNK 45 g 4    ferrous sulfate 325 mg (65 mg iron) tablet TAKE 1 TABLET BY MOUTH EVERY DAY BEFORE BREAKFAST 30 Tablet 5    ProAir HFA 90 mcg/actuation inhaler INHALE 2 PUFFS BY MOUTH EVERY 4 HOURS AS NEEDED FOR WHEEZING 25.5 Each 5    budesonide-formoteroL (SYMBICORT) 160-4.5 mcg/actuation HFAA Take 2 Puffs by inhalation two (2) times a day. 10.2 g 2    fexofenadine (ALLEGRA) 180 mg tablet Take 1 Tablet by mouth daily. 30 Tablet 3    SUMAtriptan (IMITREX) 50 mg tablet TAKE 1 TABLET BY MOUTH ONCE AS NEEDED FOR MIGRAINE FOR UP TO 1 DOSE. 9 Tablet 2    DULoxetine (CYMBALTA) 30 mg capsule TAKE 1 CAPSULE BY MOUTH TWICE A DAY 60 Capsule 5    famotidine (PEPCID) 40 mg tablet Take 1 Tablet by mouth daily. 30 Tablet 5    escitalopram oxalate (LEXAPRO) 10 mg tablet Take 1 Tablet by mouth daily.  30 Tablet 5    anastrozole (ARIMIDEX) 1 mg tablet Take 1 mg by mouth daily.  albuterol (PROVENTIL VENTOLIN) 2.5 mg /3 mL (0.083 %) nebu USE 1 VIAL IN NEBULIZER EVERY 4-6 HOURS AS NEEDED 375 mL 11    aspirin delayed-release 81 mg tablet Take 1 Tab by mouth daily.  latanoprost (XALATAN) 0.005 % ophthalmic solution Administer 1 Drop to both eyes daily.  cyanocobalamin (VITAMIN B12) 500 mcg tablet Take 1 Tab by mouth daily.  cholecalciferol (VITAMIN D3) (1000 Units /25 mcg) tablet Take 1 Tab by mouth daily.  calcium-cholecalciferol, D3, (CALTRATE 600+D) tablet Take 1 Tab by mouth daily. No Known Allergies    Objective:  Visit Vitals  BP (!) 108/59 (BP 1 Location: Left arm)   Pulse 74   Temp 97.7 °F (36.5 °C) (Temporal)   Resp 20   Ht 5' (1.524 m)   Wt 272 lb 3.2 oz (123.5 kg)   SpO2 98%   BMI 53.16 kg/m²       Physical Exam:   Constitutional: General Appearance: Morbid obesity with pleasant personality. Level of Distress: NAD. Psychiatric: Mental Status: normal mood and affect Orientation: to time, place, and person. ,normal eye contact. Head: Head: normocephalic and atraumatic. Eyes: Pupils: PERRLA. Sclerae: non-icteric. Neck: Neck: supple, trachea midline, and no masses. Lymph Nodes: no cervical LAD. Thyroid: no enlargement or nodules and non-tender. Lungs: Respiratory effort: no dyspnea. Auscultation: no wheezing, rales/crackles, or rhonchi and breath sounds normal and good air movement. Cardiovascular: Apical Impulse: not displaced. Heart Auscultation: normal S1 and S2; no murmurs, rubs, or gallops; and RRR. Neck vessels: no carotid bruits. Pulses including femoral / pedal: normal throughout. Abdomen: Bowel Sounds: normal. Inspection and Palpation: no tenderness, guarding, or masses and soft and non-distended. Liver: non-tender and no hepatomegaly. Spleen: non-tender and no splenomegaly. Musculoskeletal[de-identified] Extremities: no edema,no varicosities. No Calf tenderness.   Neurologic: Gait and Station: Keeping her balance with the help of cane. Motor Strength normal right and left. Skin: Inspection and palpation: no rash, lesions, or ulcer. Results for orders placed or performed during the hospital encounter of 70/85/13   METABOLIC PANEL, COMPREHENSIVE   Result Value Ref Range    Sodium 141 136 - 145 mmol/L    Potassium 4.3 3.5 - 5.1 mmol/L    Chloride 109 (H) 97 - 108 mmol/L    CO2 29 21 - 32 mmol/L    Anion gap 3 (L) 5 - 15 mmol/L    Glucose 89 65 - 100 mg/dL    BUN 16 6 - 20 mg/dL    Creatinine 1.53 (H) 0.55 - 1.02 mg/dL    BUN/Creatinine ratio 10 (L) 12 - 20      GFR est AA 40 (L) >60 ml/min/1.73m2    GFR est non-AA 33 (L) >60 ml/min/1.73m2    Calcium 9.8 8.5 - 10.1 mg/dL    Bilirubin, total 0.4 0.2 - 1.0 mg/dL    AST (SGOT) 20 15 - 37 U/L    ALT (SGPT) 13 12 - 78 U/L    Alk. phosphatase 96 45 - 117 U/L    Protein, total 7.5 6.4 - 8.2 g/dL    Albumin 3.2 (L) 3.5 - 5.0 g/dL    Globulin 4.3 (H) 2.0 - 4.0 g/dL    A-G Ratio 0.7 (L) 1.1 - 2.2     CBC WITH AUTOMATED DIFF   Result Value Ref Range    WBC 5.3 3.6 - 11.0 K/uL    RBC 4.87 3.80 - 5.20 M/uL    HGB 11.1 (L) 11.5 - 16.0 g/dL    HCT 37.0 35.0 - 47.0 %    MCV 76.0 (L) 80.0 - 99.0 FL    MCH 22.8 (L) 26.0 - 34.0 PG    MCHC 30.0 30.0 - 36.5 g/dL    RDW 14.4 11.5 - 14.5 %    PLATELET 478 651 - 508 K/uL    MPV 9.2 8.9 - 12.9 FL    NRBC 0.0 0.0  WBC    ABSOLUTE NRBC 0.00 0.00 - 0.01 K/uL    NEUTROPHILS 67 32 - 75 %    LYMPHOCYTES 21 12 - 49 %    MONOCYTES 7 5 - 13 %    EOSINOPHILS 5 0 - 7 %    BASOPHILS 0 0 - 1 %    IMMATURE GRANULOCYTES 0 0 - 0.5 %    ABS. NEUTROPHILS 3.5 1.8 - 8.0 K/UL    ABS. LYMPHOCYTES 1.1 0.8 - 3.5 K/UL    ABS. MONOCYTES 0.4 0.0 - 1.0 K/UL    ABS. EOSINOPHILS 0.3 0.0 - 0.4 K/UL    ABS. BASOPHILS 0.0 0.0 - 0.1 K/UL    ABS. IMM. GRANS. 0.0 0.00 - 0.04 K/UL    DF AUTOMATED         Assessment/Plan:      ICD-10-CM ICD-9-CM    1. Chronic obstructive pulmonary disease, unspecified COPD type ([facility]  J44.9 496    2.  Liver disease, chronic, with cirrhosis ([facility]  K74.60 571.9     K76.9 571.5    3. Stage 3b chronic kidney disease (HCC)  N18.32 585.3 CBC WITH AUTOMATED DIFF      METABOLIC PANEL, COMPREHENSIVE   4. Anxiety  F41.9 300.00    5. Primary osteoarthritis of both knees  M17.0 715.16    6. Headache disorder  R51.9 784.0    7. Depressive disorder  F32. A 311    8. Gastroesophageal reflux disease without esophagitis  K21.9 530.81    9. BMI 50.0-59.9, adult ([facility]  Z68.43 V85.43    10. Ex-smoker  Z87.891 V15.82 CT LOW DOSE LUNG CANCER SCREENING   11. Chronic bilateral low back pain, unspecified whether sciatica present  M54.50 724.2     G89.29 338.29    12. Other cirrhosis of liver ([facility]  K74.69 571.5    13. History of alcohol abuse  F10.11 305.03    14. Morbid obesity ([facility]  E66.01 278.01    15. Essential hypertension  I10 401.9    16. History of breast cancer  Z85.3 V10.3    17. History of heart attack  I25.2 412    18. History of bipolar disorder  Z86.59 V11.1    19. History of hepatitis C  Z86.19 V12.09    20. Menopausal and postmenopausal disorder  N95.9 627.9 DEXA BONE DENSITY STUDY AXIAL   21. Screening for lung cancer  Z12.2 V76.0 CT LOW DOSE LUNG CANCER SCREENING   22. Screening for lipid disorders  Z13.220 V77.91 LIPID PANEL     Orders Placed This Encounter    DEXA BONE DENSITY STUDY AXIAL     Standing Status:   Future     Standing Expiration Date:   6/9/2023    CT LOW DOSE LUNG CANCER SCREENING     Standing Status:   Future     Standing Expiration Date:   12/9/2022     Order Specific Question:   Is there documentation of shared decision making? Answer:   Yes     Order Specific Question:   Does the patient show any signs or symptoms of lung cancer? Answer:   No     Order Specific Question:   Is this the first (baseline) CT or an annual exam?     Answer:   Baseline [1]     Order Specific Question:   Is this a low dose CT or a routine CT? Answer:   Low Dose CT [1]     Order Specific Question:   Smoking Status     Answer:    Former Smoker [4]     Order Specific Question:   Number of years since quit? Answer:   8     Order Specific Question:   Smoking packs per day? Answer:   1     Order Specific Question:   Years smoking? Answer:   27     Order Specific Question:   The patient was informed of the importance of adherence to annual screening, impact of comorbidities and ability/willingness to undergo diagnosis and treatment     Answer:   Yes     Order Specific Question:   The patient was informed of the importance of smoking cessation and/or maintaining smoking abstinence, including the offer of Medicare-covered tobacco cessation counseling services, if applicable     Answer: Yes    CBC WITH AUTOMATED DIFF    METABOLIC PANEL, COMPREHENSIVE    LIPID PANEL    docusate sodium (COLACE) 100 mg capsule     Sig: Take 100 mg by mouth two (2) times a day.  topiramate (TOPAMAX) 25 mg tablet     Sig: Take  by mouth two (2) times a day. Hypertension controlled no dizziness. Continued on lifestyle modifications currently she is off from the medication. Screening for lipid profile ordered. COPD due to history of smoking continue rescue inhaler screening for low-dose lung CT ordered for screening lung cancer. She quit since 2014 used to smoke 1 pack a day for last 30 years. Continue maintenance inhaler. Morbid obesity with bilateral knee osteoarthritis that is causing her sometimes depression no suicidal thoughts no homicidal thoughts she is already taking citalopram 10 mg/day she had history of bipolar disorder in the past and history of depression in the past she is under care of psychiatrist Dr. Keisha Nolasco and discussed with her granddaughter Orville Clinton to make appointment and she has to go with each appointment with each doctor that she agreed.     She said she is not depressed otherwise only her bilateral knee pain is making her depressed that it is not helping explained that there are very few things that can be done it is very challenging with her morbid obesity of 53.16 BMI that she has to work hard to lose weight with healthy eating habits and calorie restricted in calorie disciplined diet of about 1200-calorie diet plan and by getting history it seems like she is eating a lot of unhealthy high sugar and high starch and high fat containing snacks and desserts and she has to stop drinking regular soda and juice and ice cream cupcake cookies crackers and she should eat more known sugary fruits and she should differentiate between hunger and starving and explained admission week also voiced understanding and she can get steroid injection it is not a good idea to give oral NSAIDs having history of hepatitis C and liver cirrhosis and history of myocardial infarction and she can take Tylenol arthritis 650 mg 1 pill 2-3 times a day and topical NSAID very gently because she is also has CKD stage IIIb. Avoid OTC NSAIDs by mouth having CKD stage IIIb avoid dehydration she follows nephrologist continue follow-up with him. History of gastritis and GERD continue pantoprazole. History of anxiety with chronic pain getting duloxetine 30 mg twice a day. No negative thoughts. No suicidal thoughts. Headache disorder most likely migraine taking sumatriptan 50 mg once a day as needed and topiramate 25 mg twice a day and under care of neurologist.    History of chronic hepatitis C and liver cirrhosis she used to see hepatologist at [address] however she could not have transportation that far so she has started seeing Dr. Alondra Lara here as her his gastroenterologist who takes care of her liver problem  . It is stable. History of constipation taking MiraLAX and Colace as needed. Labs ordered. Education counseling given.   I spent 40 minutes with patient and her granddaughter and explained in details about the management and her concerns and addressed her knee pain her depression and her existing medical problems in length and strongly recommended to lose weight which will help her in other problems also especially relief in musculoskeletal pain. Follow-up in 3 months. Referral given. Continue vitamin D3 and calcium. lose weight, increase physical activity, follow low fat diet, follow low salt diet, continue present plan, routine labs ordered, call if any problems    There are no Patient Instructions on file for this visit. Follow-up and Dispositions    · Return in about 3 months (around 9/9/2022) for follow up for chronic condition.

## 2022-06-09 NOTE — PROGRESS NOTES
1. \"Have you been to the ER, urgent care clinic since your last visit? Hospitalized since your last visit? \" No    2. \"Have you seen or consulted any other health care providers outside of the [address] [place] [address] since your last visit? \" No     3. For patients aged 39-70: Has the patient had a colonoscopy / FIT/ Cologuard? Yes - Care Gap present. Most recent result on file      If the patient is female:    4. For patients aged 41-77: Has the patient had a mammogram within the past 2 years? Yes - Care Gap present. Most recent result on file      5. For patients aged 21-65: Has the patient had a pap smear?  NA - based on age or sex     Visit Vitals  BP (!) 108/59 (BP 1 Location: Left arm)   Pulse 74   Temp 97.7 °F (36.5 °C) (Temporal)   Resp 20   Ht 5' (1.524 m)   Wt 272 lb 3.2 oz (123.5 kg)   SpO2 98%   BMI 53.16 kg/m²       Chief Complaint   Patient presents with    Follow Up Chronic Condition    Hypertension

## 2022-06-10 LAB
ALBUMIN SERPL-MCNC: 4.1 G/DL (ref 3.7–4.7)
ALBUMIN/GLOB SERPL: 1.2 {RATIO} (ref 1.2–2.2)
ALP SERPL-CCNC: 97 IU/L (ref 44–121)
ALT SERPL-CCNC: 7 IU/L (ref 0–32)
AST SERPL-CCNC: 13 IU/L (ref 0–40)
BASOPHILS # BLD AUTO: 0 X10E3/UL (ref 0–0.2)
BASOPHILS NFR BLD AUTO: 1 %
BILIRUB SERPL-MCNC: 0.3 MG/DL (ref 0–1.2)
BUN SERPL-MCNC: 18 MG/DL (ref 8–27)
BUN/CREAT SERPL: 12 (ref 12–28)
CALCIUM SERPL-MCNC: 10.3 MG/DL (ref 8.7–10.3)
CHLORIDE SERPL-SCNC: 105 MMOL/L (ref 96–106)
CHOLEST SERPL-MCNC: 219 MG/DL (ref 100–199)
CO2 SERPL-SCNC: 20 MMOL/L (ref 20–29)
CREAT SERPL-MCNC: 1.5 MG/DL (ref 0.57–1)
EGFR: 36 ML/MIN/1.73
EOSINOPHIL # BLD AUTO: 0.2 X10E3/UL (ref 0–0.4)
EOSINOPHIL NFR BLD AUTO: 4 %
ERYTHROCYTE [DISTWIDTH] IN BLOOD BY AUTOMATED COUNT: 14.4 % (ref 11.7–15.4)
GLOBULIN SER CALC-MCNC: 3.3 G/DL (ref 1.5–4.5)
GLUCOSE SERPL-MCNC: 90 MG/DL (ref 65–99)
HCT VFR BLD AUTO: 37.7 % (ref 34–46.6)
HDLC SERPL-MCNC: 61 MG/DL
HGB BLD-MCNC: 11.5 G/DL (ref 11.1–15.9)
IMM GRANULOCYTES # BLD AUTO: 0 X10E3/UL (ref 0–0.1)
IMM GRANULOCYTES NFR BLD AUTO: 0 %
LDLC SERPL CALC-MCNC: 148 MG/DL (ref 0–99)
LYMPHOCYTES # BLD AUTO: 1.2 X10E3/UL (ref 0.7–3.1)
LYMPHOCYTES NFR BLD AUTO: 19 %
MCH RBC QN AUTO: 22.7 PG (ref 26.6–33)
MCHC RBC AUTO-ENTMCNC: 30.5 G/DL (ref 31.5–35.7)
MCV RBC AUTO: 75 FL (ref 79–97)
MONOCYTES # BLD AUTO: 0.3 X10E3/UL (ref 0.1–0.9)
MONOCYTES NFR BLD AUTO: 5 %
NEUTROPHILS # BLD AUTO: 4.3 X10E3/UL (ref 1.4–7)
NEUTROPHILS NFR BLD AUTO: 71 %
PLATELET # BLD AUTO: 278 X10E3/UL (ref 150–450)
POTASSIUM SERPL-SCNC: 4.7 MMOL/L (ref 3.5–5.2)
PROT SERPL-MCNC: 7.4 G/DL (ref 6–8.5)
RBC # BLD AUTO: 5.06 X10E6/UL (ref 3.77–5.28)
SODIUM SERPL-SCNC: 142 MMOL/L (ref 134–144)
TRIGL SERPL-MCNC: 59 MG/DL (ref 0–149)
VLDLC SERPL CALC-MCNC: 10 MG/DL (ref 5–40)
WBC # BLD AUTO: 6 X10E3/UL (ref 3.4–10.8)

## 2022-06-10 RX ORDER — PRAVASTATIN SODIUM 10 MG/1
10 TABLET ORAL
Qty: 90 TABLET | Refills: 1 | Status: SHIPPED | OUTPATIENT
Start: 2022-06-10

## 2022-06-10 NOTE — PROGRESS NOTES
Please call Ms. Brodie Ferguson that her hemoglobin has improved continue same medicines. Her kidney function is low but stable compared with 3 months ago so make sure she drinks enough water. Please let her know that her cholesterol is high so she has to stop all the foods that are not good and yesterday had explained that she should not have starch and sugar containing beverages snacks and meals and saturated fat which is more in the animal containing fat like meat products instead eat more vegetables and known sugary fruits and try to lose weight and her cholesterol is high so I will start her on pravastatin 10 mg at bedtime if she agree we will give her a refill f,you can order I will sign,

## 2022-06-16 RX ORDER — AMOXICILLIN AND CLAVULANATE POTASSIUM 500; 125 MG/1; MG/1
1 TABLET, FILM COATED ORAL 2 TIMES DAILY
Qty: 14 TABLET | Refills: 0 | Status: SHIPPED | OUTPATIENT
Start: 2022-06-16

## 2022-06-16 RX ORDER — TOPIRAMATE 25 MG/1
TABLET ORAL 2 TIMES DAILY
Status: CANCELLED | OUTPATIENT
Start: 2022-06-16

## 2022-06-16 NOTE — TELEPHONE ENCOUNTER
Patient wants to know if you could send abx in for her mouth, its hurting and she doesn't see the dentist for a few weeks?

## 2022-06-20 ENCOUNTER — TELEPHONE (OUTPATIENT)
Dept: INTERNAL MEDICINE CLINIC | Age: 77
End: 2022-06-20

## 2022-06-20 NOTE — TELEPHONE ENCOUNTER
----- Message from Franki Goodrich sent at 6/15/2022 12:10 PM EDT -----  Subject: Refill Request    QUESTIONS  Name of Medication? Other - Seizure meds Pt does not know name of meds  Patient-reported dosage and instructions? one in the morning and once in   the evening   How many days do you have left? 0  Preferred Pharmacy? [facility] phone number (if available)? [phone]  Additional Information for Provider? Pt  ---------------------------------------------------------------------------  --------------  CALL BACK INFO  What is the best way for the office to contact you? OK to leave message on   voicemail  Preferred Call Back Phone Number? [phone]  ---------------------------------------------------------------------------  --------------  SCRIPT ANSWERS  Relationship to Patient?  Self

## 2022-06-29 PROBLEM — Z12.2 SCREENING FOR LUNG CANCER: Status: RESOLVED | Noted: 2020-10-28 | Resolved: 2022-06-29

## 2022-06-30 ENCOUNTER — HOSPITAL ENCOUNTER (OUTPATIENT)
Dept: CT IMAGING | Age: 77
Discharge: HOME | End: 2022-06-30
Attending: INTERNAL MEDICINE
Payer: MEDICAID

## 2022-06-30 VITALS — BODY MASS INDEX: 53.01 KG/M2 | WEIGHT: 270 LBS | HEIGHT: 60 IN

## 2022-06-30 DIAGNOSIS — Z87.891 EX-SMOKER: ICD-10-CM

## 2022-06-30 DIAGNOSIS — Z12.2 SCREENING FOR LUNG CANCER: ICD-10-CM

## 2022-06-30 PROCEDURE — 71271 CT THORAX LUNG CANCER SCR C-: CPT

## 2022-07-04 NOTE — PROGRESS NOTES
Please call Ms. Tan Caro CT scan of the lung which was done for screening lung cancer shows    6.6 x 5 mm nodule in right lower lobe and unchanged nodule in left lower lobe calcified granuloma left upper lobe she is recommended that probably benign and according to the radiologist management with a follow-up CT scan in 6 months. And she has quit smoking cigarette that is good.

## 2022-07-06 DIAGNOSIS — K76.9 LIVER DISEASE, CHRONIC, WITH CIRRHOSIS (HCC): Primary | ICD-10-CM

## 2022-07-06 DIAGNOSIS — K74.60 LIVER DISEASE, CHRONIC, WITH CIRRHOSIS (HCC): Primary | ICD-10-CM

## 2022-07-14 ENCOUNTER — TELEPHONE (OUTPATIENT)
Dept: INTERNAL MEDICINE CLINIC | Age: 77
End: 2022-07-14

## 2022-07-14 NOTE — TELEPHONE ENCOUNTER
----- Message from Hari Kathleen sent at 7/12/2022  3:47 PM EDT -----  Subject: Message to Provider    QUESTIONS  Information for Provider? pt called to see why Leslie Contreras is wanting   her to schedule appt pt is requesting a phone call to discuss thank you   ---------------------------------------------------------------------------  --------------  [id number]  [phone]; OK to leave message on voicemail  ---------------------------------------------------------------------------  --------------  SCRIPT ANSWERS  Relationship to Patient?  Self

## 2022-08-08 ENCOUNTER — TELEPHONE (OUTPATIENT)
Dept: INTERNAL MEDICINE CLINIC | Age: 77
End: 2022-08-08

## 2022-08-08 RX ORDER — CEPHALEXIN 500 MG/1
500 CAPSULE ORAL 3 TIMES DAILY
Qty: 21 CAPSULE | Refills: 0 | Status: SHIPPED | OUTPATIENT
Start: 2022-08-08 | End: 2022-08-19 | Stop reason: ALTCHOICE

## 2022-08-08 NOTE — TELEPHONE ENCOUNTER
The patient states that she has an abcess in one of her teeth. She wants to know if she can get ABT for this. She has an appointment coming up with her Dentist on Thursday.

## 2022-08-08 NOTE — TELEPHONE ENCOUNTER
Patient c/o infection in mouth. Soonest appt she can get with the dentist is Thursday. C/o severe discomfort. Wants to know if you can send in abx. Please advise.

## 2022-08-16 ENCOUNTER — OFFICE VISIT (OUTPATIENT)
Dept: SURGERY | Age: 77
End: 2022-08-16

## 2022-08-16 VITALS
WEIGHT: 273 LBS | HEIGHT: 60 IN | SYSTOLIC BLOOD PRESSURE: 137 MMHG | BODY MASS INDEX: 53.6 KG/M2 | DIASTOLIC BLOOD PRESSURE: 84 MMHG | OXYGEN SATURATION: 98 % | TEMPERATURE: 97.5 F | HEART RATE: 103 BPM

## 2022-08-16 DIAGNOSIS — K80.20 GALLSTONES: ICD-10-CM

## 2022-08-16 DIAGNOSIS — R10.33 PERIUMBILICAL ABDOMINAL PAIN: Primary | ICD-10-CM

## 2022-08-16 PROCEDURE — 99204 OFFICE O/P NEW MOD 45 MIN: CPT | Performed by: SURGERY

## 2022-08-16 PROCEDURE — 1123F ACP DISCUSS/DSCN MKR DOCD: CPT | Performed by: SURGERY

## 2022-08-16 NOTE — PROGRESS NOTES
[address], [facility] Darryl Montes [place], [facility]  [phone]      Patient Name: Marly Carrero (7 y.o., female)    Patient Address: [address] [place] [id number]    PCP: Dilip Serna MD     Patient contact numbers:     Home Phone  Work Phone  Mobile    [phone]       History of Present Illness    Patient is a 78 y.o. female who presents to the office today for evaluation of abdominal pain. She states she has a several month history of periumbilical abdominal pain which radiates around to her sides and down to her left lower quadrant. She admits to some nausea as well. She has been eating well and tolerating a regular diet. She denies that her symptoms are related to food intake. She states the pain is constant and worsens more often with movement. She has other complaints as well, including feeling like her heartbeat doubles sometimes as well as a chronic toothache. She denies any acid reflux. She has a complicated past medical history including chronic kidney disease as well as liver disease/cirrhosis. She had a ultrasound of the abdomen in 12/2021 which demonstrated a single gallstone without concern for cholecystitis. She also had a recent CT of the chest for lung cancer screening. This again demonstrated a single gallstone but I did not see any evidence of cholecystitis. Her most recent labs showed a normal WBC count and normal LFTs.       Past Medical History:   Diagnosis Date    Anemia     Asthma 8/21/2020    Bipolar 1 disorder ([address] 8/21/2020    Breast CA ([address] 9/29/2021    Chronic bilateral low back pain, unspecified whether sciatica present     Chronic hepatitis ([address] 8/21/2020    Chronic kidney disease (CKD), stage II (mild) 8/21/2020    Cirrhosis of liver ([address] 8/21/2020    COPD (chronic obstructive pulmonary disease) ([address] 8/21/2020    Depressive disorder 8/21/2020    Edema 8/21/2020    Headache disorder 2021    History of mood disorder 2020    Hypertension     Lumbar radiculopathy 2020    Morbid obesity ([address] 2020    Osteoarthritis 2020    Pain, knee 2020    Pure hypercholesterolemia with target low density lipoprotein (LDL) cholesterol less than 130 mg/dL 2020    RUQ abdominal pain 2020    Sleep apnea 2020    Vitamin D deficiency 2020       Past Surgical History:   Procedure Laterality Date    HX BREAST BIOPSY Right     benign    HX BREAST LUMPECTOMY Right 2021    RIGHT PARTIAL MASTECTOMY performed by Verna Galindo MD at [address]    HX COLONOSCOPY      HX COLONOSCOPY      HX CYST INCISION AND DRAINAGE      HX GYN      HX TOTAL ABDOMINAL HYSTERECTOMY      IR [address] LIVER NEEDLE W OTHER [address]         Family History   Problem Relation Age of Onset    Hypertension Mother     Hypertension Brother     Heart Disease Brother     Hypertension Maternal Grandmother        Social History     Tobacco Use    Smoking status: Former     Packs/day: 0.50     Years: 30.00     Pack years: 15.00     Types: Cigarettes     Quit date:      Years since quittin.6    Smokeless tobacco: Never   Vaping Use    Vaping Use: Never used   Substance Use Topics    Alcohol use: Not Currently     Comment: on birthday last year    Drug use: Never       No Known Allergies    Current Outpatient Medications   Medication Sig    DULoxetine (CYMBALTA) 30 mg capsule Take 1 Capsule by mouth two (2) times a day. amoxicillin-clavulanate (AUGMENTIN) 500-125 mg per tablet Take 1 Tablet by mouth two (2) times a day. With food    pravastatin (PRAVACHOL) 10 mg tablet Take 1 Tablet by mouth nightly. docusate sodium (COLACE) 100 mg capsule Take 100 mg by mouth two (2) times a day. topiramate (TOPAMAX) 25 mg tablet Take  by mouth two (2) times a day. fluticasone propionate (FLONASE) 50 mcg/actuation nasal spray 2 Sprays by Both Nostrils route daily. pantoprazole (PROTONIX) 40 mg tablet TAKE 1 TABLET BY MOUTH TWICE A DAY BEFORE BREAKFAST AND DINNER    triamcinolone acetonide (KENALOG) 0.1 % ointment APPLY TO AFFECTED AREA 2 TIMES A DAY. USE TWICE A DAY FOR 1 WEEK THEN AS NEEDED FOR THE RASH ON THE TRUNK    ferrous sulfate 325 mg (65 mg iron) tablet TAKE 1 TABLET BY MOUTH EVERY DAY BEFORE BREAKFAST    SUMAtriptan (IMITREX) 50 mg tablet TAKE 1 TABLET BY MOUTH ONCE AS NEEDED FOR MIGRAINE FOR UP TO 1 DOSE.    famotidine (PEPCID) 40 mg tablet Take 1 Tablet by mouth daily. escitalopram oxalate (LEXAPRO) 10 mg tablet Take 1 Tablet by mouth daily. anastrozole (ARIMIDEX) 1 mg tablet Take 1 mg by mouth daily. albuterol (PROVENTIL VENTOLIN) 2.5 mg /3 mL (0.083 %) nebu USE 1 VIAL IN NEBULIZER EVERY 4-6 HOURS AS NEEDED    aspirin delayed-release 81 mg tablet Take 1 Tab by mouth daily. latanoprost (XALATAN) 0.005 % ophthalmic solution Administer 1 Drop to both eyes daily. cyanocobalamin (VITAMIN B12) 500 mcg tablet Take 1 Tab by mouth daily. cholecalciferol (VITAMIN D3) (1000 Units /25 mcg) tablet Take 1 Tab by mouth daily. calcium-cholecalciferol, D3, (CALTRATE 600+D) tablet Take 1 Tab by mouth daily. cephALEXin (KEFLEX) 500 mg capsule Take 1 Capsule by mouth three (3) times daily. Take with food and OTC probiotics and yogurt (Patient not taking: Reported on 8/16/2022)    diclofenac (VOLTAREN) 1 % gel Apply 2 g to affected area two (2) times a day. (Patient not taking: Reported on 8/16/2022)    polyethylene glycol (MIRALAX) 17 gram packet Take 1 Packet by mouth daily as needed for Constipation. (Patient not taking: Reported on 8/16/2022)    ProAir HFA 90 mcg/actuation inhaler INHALE 2 PUFFS BY MOUTH EVERY 4 HOURS AS NEEDED FOR WHEEZING (Patient not taking: Reported on 8/16/2022)    budesonide-formoteroL (SYMBICORT) 160-4.5 mcg/actuation HFAA Take 2 Puffs by inhalation two (2) times a day.  (Patient not taking: Reported on 8/16/2022)    fexofenadine (ALLEGRA) 180 mg tablet Take 1 Tablet by mouth daily. (Patient not taking: Reported on 8/16/2022)     No current facility-administered medications for this visit. Review of Systems   Constitutional:  Negative for chills, fever and weight loss. HENT:  Negative for congestion, ear pain and sore throat. Eyes:  Negative for blurred vision and redness. Respiratory:  Negative for cough, shortness of breath and wheezing. Cardiovascular:  Negative for chest pain, palpitations and leg swelling. Gastrointestinal:  Positive for abdominal pain. Negative for nausea and vomiting. Genitourinary:  Negative for dysuria, frequency and hematuria. Musculoskeletal:  Negative for joint pain and myalgias. Skin:  Negative for itching and rash. Neurological:  Negative for dizziness, seizures and headaches. Endo/Heme/Allergies:  Does not bruise/bleed easily. Physical Exam  Visit Vitals  /84 (BP 1 Location: Right upper arm, BP Patient Position: Sitting, BP Cuff Size: Large adult)   Pulse (!) 103   Temp 97.5 °F (36.4 °C) (Temporal)   Ht 5' (1.524 m)   Wt 273 lb (123.8 kg)   SpO2 98%   BMI 53.32 kg/m²       Physical Exam  Constitutional:       General: She is not in acute distress. Appearance: Normal appearance. She is obese. She is not ill-appearing. HENT:      Head: Normocephalic and atraumatic. Right Ear: External ear normal.      Left Ear: External ear normal.      Nose: Nose normal.      Mouth/Throat:      Mouth: Mucous membranes are moist.      Pharynx: Oropharynx is clear. No oropharyngeal exudate. Eyes:      General: No scleral icterus. Conjunctiva/sclera: Conjunctivae normal.   Cardiovascular:      Rate and Rhythm: Normal rate and regular rhythm. Pulses: Normal pulses. Heart sounds: Normal heart sounds. Pulmonary:      Effort: Pulmonary effort is normal. No respiratory distress. Breath sounds: Normal breath sounds.    Abdominal:      General: There is no distension. Palpations: Abdomen is soft. Tenderness: There is no abdominal tenderness. Hernia: No hernia is present. Musculoskeletal:         General: No swelling or tenderness. Normal range of motion. Cervical back: Normal range of motion and neck supple. No rigidity or tenderness. Skin:     General: Skin is warm and dry. Neurological:      General: No focal deficit present. Mental Status: She is alert and oriented to person, place, and time. Psychiatric:         Mood and Affect: Mood normal.         Behavior: Behavior normal.        Assessment  Problem List Items Addressed This Visit    None  Visit Diagnoses       Periumbilical abdominal pain    -  Primary    Relevant Orders    CT ABD PELV WO CONT    Gallstones                Plan  I discussed with the patient that she does not have evidence of cholecystitis and her symptoms do not necessarily coincide with symptomatic cholelithiasis. I have recommended against surgical intervention at this time since it may not provide her any symptom relief. I have ordered a CT of the abdomen and pelvis for further evaluation. I will contact the patient with the results when they come back.       Erica Tafoya, DO

## 2022-08-16 NOTE — LETTER
8/19/2022    Patient: Lencho Garcia   YOB: 1945   Date of Visit: 8/16/2022     Abdifatah Moulton, [address]    Dear Abdifatah Moulton MD,      Thank you for referring Ms. Zaira Maciel to [address] [place] [address] for evaluation. My notes for this consultation are attached. If you have questions, please do not hesitate to call me. I look forward to following your patient along with you.       Sincerely,    Annemarie Lopezs, DO

## 2022-08-16 NOTE — PROGRESS NOTES
Chief Complaint   Patient presents with    New Patient     choleccystectomy        Visit Vitals  /84 (BP 1 Location: Right upper arm, BP Patient Position: Sitting, BP Cuff Size: Large adult)   Pulse (!) 103   Temp 97.5 °F (36.4 °C) (Temporal)   Ht 5' (1.524 m)   Wt 273 lb (123.8 kg)   SpO2 98%   BMI 53.32 kg/m²    First bp reading 152/84 right upper arm    1. Have you been to the ER, urgent care clinic since your last visit? Hospitalized since your last visit? No    2. Have you seen or consulted any other health care providers outside of the [address] [place] [address] since your last visit? Include any pap smears or colon screening.  No

## 2022-08-24 ENCOUNTER — TELEPHONE (OUTPATIENT)
Dept: SURGERY | Age: 77
End: 2022-08-24

## 2022-08-24 ENCOUNTER — TELEPHONE (OUTPATIENT)
Dept: INTERNAL MEDICINE CLINIC | Age: 77
End: 2022-08-24

## 2022-08-24 NOTE — TELEPHONE ENCOUNTER
[address] faxed refill request for the following medication:      Symbicort 160-4.5 MCG INH  QTY: 10.20 GM  Inhale 2 puffs by mouth 2 times a day              LOV   6-9-2022  NOV 9-

## 2022-08-24 NOTE — TELEPHONE ENCOUNTER
LM to see if CT has been scheduled yet.  If not patient can call [phone] they will have the order to schedule

## 2022-08-30 ENCOUNTER — HOSPITAL ENCOUNTER (OUTPATIENT)
Dept: MAMMOGRAPHY | Age: 77
Discharge: HOME | End: 2022-08-30
Attending: INTERNAL MEDICINE
Payer: MEDICAID

## 2022-08-30 DIAGNOSIS — N95.9 MENOPAUSAL AND POSTMENOPAUSAL DISORDER: ICD-10-CM

## 2022-08-30 PROCEDURE — 77080 DXA BONE DENSITY AXIAL: CPT

## 2022-08-31 NOTE — PROGRESS NOTES
Please call Ms. Shoemaker her bone shows osteopenia so continue calcium with vitamin D and she should start weightbearing exercise to strengthen the bones 2 to 3 days a week and try to walk as much as she can walk and continue healthy diet

## 2022-09-19 PROBLEM — N18.9 ANEMIA DUE TO CHRONIC KIDNEY DISEASE, UNSPECIFIED CKD STAGE: Status: RESOLVED | Noted: 2021-12-02 | Resolved: 2022-09-19

## 2022-09-19 PROBLEM — D63.1 ANEMIA DUE TO CHRONIC KIDNEY DISEASE, UNSPECIFIED CKD STAGE: Status: RESOLVED | Noted: 2021-12-02 | Resolved: 2022-09-19

## 2022-09-27 ENCOUNTER — HOSPITAL ENCOUNTER (OUTPATIENT)
Dept: CT IMAGING | Age: 77
Discharge: HOME | End: 2022-09-27
Attending: SURGERY
Payer: MEDICAID

## 2022-09-27 ENCOUNTER — TRANSCRIBE ORDER (OUTPATIENT)
Dept: SCHEDULING | Age: 77
End: 2022-09-27

## 2022-09-27 DIAGNOSIS — C50.811 MALIGNANT NEOPLASM OF OVERLAPPING SITES OF RIGHT FEMALE BREAST (HCC): Primary | ICD-10-CM

## 2022-09-27 DIAGNOSIS — R10.33 PERIUMBILICAL ABDOMINAL PAIN: ICD-10-CM

## 2022-09-27 PROCEDURE — 74176 CT ABD & PELVIS W/O CONTRAST: CPT

## 2022-10-05 ENCOUNTER — TRANSCRIBE ORDER (OUTPATIENT)
Dept: SCHEDULING | Age: 77
End: 2022-10-05

## 2022-10-05 DIAGNOSIS — C50.811 MALIGNANT NEOPLASM OF OVERLAPPING SITES OF RIGHT FEMALE BREAST (HCC): Primary | ICD-10-CM

## 2022-10-07 NOTE — PROGRESS NOTES
Discussed CT scan results with patient. Small gallstone noted. Unlikely the source of patient's pain. She states her pain has been minimal over the past few weeks. She does have cirrhosis as well as perigastric varices and portal hypertension, otherwise no acute pathology noted. Recommend follow up with hepatology. No surgery needed from my standpoint, the patient stated her understanding and agreed with our plan.

## 2022-10-17 ENCOUNTER — TRANSCRIBE ORDER (OUTPATIENT)
Dept: SCHEDULING | Age: 77
End: 2022-10-17

## 2022-10-17 DIAGNOSIS — C50.811 MALIGNANT NEOPLASM OF OVERLAPPING SITES OF RIGHT FEMALE BREAST (HCC): Primary | ICD-10-CM

## 2022-10-25 ENCOUNTER — TELEPHONE (OUTPATIENT)
Dept: INTERNAL MEDICINE CLINIC | Age: 77
End: 2022-10-25

## 2022-11-09 ENCOUNTER — HOSPITAL ENCOUNTER (OUTPATIENT)
Dept: MAMMOGRAPHY | Age: 77
Discharge: HOME | End: 2022-11-09
Attending: INTERNAL MEDICINE
Payer: MEDICAID

## 2022-11-09 DIAGNOSIS — C50.811 MALIGNANT NEOPLASM OF OVERLAPPING SITES OF RIGHT FEMALE BREAST (HCC): ICD-10-CM

## 2022-11-09 PROCEDURE — 77062 BREAST TOMOSYNTHESIS BI: CPT

## 2023-02-06 ENCOUNTER — TRANSCRIBE ORDER (OUTPATIENT)
Dept: SCHEDULING | Age: 78
End: 2023-02-06

## 2023-02-06 DIAGNOSIS — C50.811 MALIGNANT NEOPLASM OF OVERLAPPING SITES OF RIGHT FEMALE BREAST (HCC): Primary | ICD-10-CM

## 2023-03-06 ENCOUNTER — TRANSCRIBE ORDER (OUTPATIENT)
Dept: SCHEDULING | Age: 78
End: 2023-03-06

## 2023-03-06 DIAGNOSIS — Z13.820 SPECIAL SCREENING FOR OSTEOPOROSIS: ICD-10-CM

## 2023-03-06 DIAGNOSIS — C50.811 MALIGNANT NEOPLASM OF OVERLAPPING SITES OF RIGHT FEMALE BREAST (HCC): Primary | ICD-10-CM

## 2023-03-20 ENCOUNTER — TRANSCRIBE ORDER (OUTPATIENT)
Dept: SCHEDULING | Age: 78
End: 2023-03-20

## 2023-03-20 DIAGNOSIS — C50.811 MALIGNANT NEOPLASM OF OVERLAPPING SITES OF RIGHT FEMALE BREAST (HCC): Primary | ICD-10-CM

## 2023-04-21 ENCOUNTER — OFFICE VISIT (OUTPATIENT)
Dept: INTERNAL MEDICINE CLINIC | Age: 78
End: 2023-04-21

## 2023-04-21 VITALS
OXYGEN SATURATION: 99 % | HEIGHT: 60 IN | BODY MASS INDEX: 53.6 KG/M2 | TEMPERATURE: 98.3 F | WEIGHT: 273 LBS | HEART RATE: 69 BPM

## 2023-04-21 DIAGNOSIS — Z79.811 LONG TERM CURRENT USE OF AROMATASE INHIBITOR: ICD-10-CM

## 2023-04-21 DIAGNOSIS — M17.0 PRIMARY OSTEOARTHRITIS OF BOTH KNEES: ICD-10-CM

## 2023-04-21 DIAGNOSIS — F32.A DEPRESSIVE DISORDER: ICD-10-CM

## 2023-04-21 DIAGNOSIS — R51.9 HEADACHE DISORDER: ICD-10-CM

## 2023-04-21 DIAGNOSIS — K21.9 GASTROESOPHAGEAL REFLUX DISEASE WITHOUT ESOPHAGITIS: ICD-10-CM

## 2023-04-21 DIAGNOSIS — N18.32 STAGE 3B CHRONIC KIDNEY DISEASE (HCC): ICD-10-CM

## 2023-04-21 DIAGNOSIS — Z85.3 HISTORY OF BREAST CANCER: ICD-10-CM

## 2023-04-21 DIAGNOSIS — E78.00 PURE HYPERCHOLESTEROLEMIA: ICD-10-CM

## 2023-04-21 DIAGNOSIS — E55.9 VITAMIN D DEFICIENCY: ICD-10-CM

## 2023-04-21 DIAGNOSIS — J44.9 CHRONIC OBSTRUCTIVE PULMONARY DISEASE, UNSPECIFIED COPD TYPE (HCC): ICD-10-CM

## 2023-04-21 DIAGNOSIS — K76.9 LIVER DISEASE, CHRONIC, WITH CIRRHOSIS (HCC): Primary | ICD-10-CM

## 2023-04-21 DIAGNOSIS — I25.2 HISTORY OF HEART ATTACK: ICD-10-CM

## 2023-04-21 DIAGNOSIS — K74.60 LIVER DISEASE, CHRONIC, WITH CIRRHOSIS (HCC): Primary | ICD-10-CM

## 2023-04-21 DIAGNOSIS — F41.9 ANXIETY: ICD-10-CM

## 2023-04-21 RX ORDER — ONDANSETRON 4 MG/1
1 TABLET, ORALLY DISINTEGRATING ORAL AS NEEDED
COMMUNITY
Start: 2023-03-04

## 2023-04-21 RX ORDER — DICLOFENAC SODIUM 10 MG/G
4 GEL TOPICAL
COMMUNITY
Start: 2023-04-18

## 2023-04-21 RX ORDER — HYDROCORTISONE 1 %
1 CREAM (GRAM) TOPICAL 2 TIMES DAILY
COMMUNITY

## 2023-04-21 RX ORDER — ZONISAMIDE 100 MG/1
1 CAPSULE ORAL
COMMUNITY
Start: 2023-04-13

## 2023-04-21 NOTE — PROGRESS NOTES
Narciso Cummings (: 1945) is a 78 y.o. female, established patient, here for evaluation of the following chief complaint(s):  Follow Up Chronic Condition         ASSESSMENT/PLAN:  Below is the assessment and plan developed based on review of pertinent history, physical exam, labs, studies, and medications. 1. Liver disease, chronic, with cirrhosis ([facility]  -     REFERRAL TO GASTROENTEROLOGY  -     CBC WITH AUTOMATED DIFF  -     METABOLIC PANEL, COMPREHENSIVE  -     TSH RFX ON ABNORMAL TO FREE T4  2. Chronic obstructive pulmonary disease, unspecified COPD type ([facility]  3. Anxiety  4. Stage 3b chronic kidney disease ([facility]  5. Depressive disorder  6. Pure hypercholesterolemia  -     LIPID PANEL  7. BMI 50.0-59.9, adult ([facility]  8. Gastroesophageal reflux disease without esophagitis  9. Headache disorder  10. Vitamin D deficiency  -     VITAMIN D, 25 HYDROXY  11. Long term current use of aromatase inhibitor  12. Primary osteoarthritis of both knees  13. History of breast cancer  14. History of heart attack    Liver cirrhosis with with history of hepatitis C, recently visited [address] and her liver enzymes are normal she has cholelithiasis and CT abdomen pelvis was done showing hepatocellular disease without ascites. She has not consulted hepatologist and she cannot go all the way to [facility] or [facility] that she used to do in the past and she was referred to local gastroenterologist but she has not done regular monitoring explained to her granddaughter who will make appointment with Dr. Renetta Urban that she had seen once. She has no tenderness in the abdomen    Her blood pressure is controlled. No nausea vomiting. No hematemesis or melena recently visited [address] on fourth March. Reviewed the labs she has renal insufficiency which is chronic. There was no infection. However discharge diagnosis was liver cirrhosis with ascites and     CT abdomen pelvis was done.   She has cholelithiasis and hepatomegaly and no ascites or fluid collection    Anxiety with depression no negative thoughts in the past she used to drink alcohol in excess and she is ex-smoker. Taking duloxetine and escitalopram continue current medicines    CKD stage IIIb she does not take any prescription or OTC NSAIDs. Avoid dehydration. Hypercholesteremia taking pravastatin 10 mg at bedtime. GERD taking pantoprazole 40 mg twice a day she is not on famotidine    Headache most likely due to migraine follows neurologist and getting her sumatriptan and topiramate. She also takes zonisamide. No history of kidney stone    Anemia improving taking iron supplements. Vitamin D continued. COPD taking rescue and maintenance inhaler. History of breast cancer. Taking anastrozole. Strongly emphasized to make appointment with gastroenterologist.  Right now her hepatic cirrhosis is stable without major complication but she may develop acute complication anytime explained to her granddaughter and her and she understood very well and patient has a very pleasant personality but misunderstanding for seriousness of medical problems. History of anemia she takes iron supplements most recent hemoglobin at [facility] on fourth March was normal with him at great 39 and hemoglobin 12 and normal WBC and normal platelet count. Her potassium sodium normal creatinine was 1.61 with BUN 15 and liver enzymes were normal.  proBNP was normal 327 and albumin was 3.4 total protein 8.1 with lipase 74 alkaline phosphatase was 104. AST ALT was normal 17 and 18 respectively. DJD in both knee joints today she is not complaining of any pain. She cannot be given NSAID if possible. History of MI she follows with Dr. Efrain Hughes and she says yesterday she had checkup and she was told everything was normal.    Labs ordered. No active depression. I reviewed the notes from Dr. Spangler/cardiologist that she visited yesterday.       Return in about 3 months (around 7/21/2023) for follow up for chronic condition. SUBJECTIVE/OBJECTIVE:  MILADIS Soares Sprain with a very pleasant personality who walks with cane came with her pleasant granddaughter. Her medicine reconciliation done by me. She told me that she had visited [facility] on 4 March 2023/[place] and I reviewed the notes. She is known to have liver cirrhosis. She has not consulted a gastroenterologist or liver specialist/hepatologist.  She follows multiple specialist including neurologist for her migraine and orthopedic surgeon and pulmonologist.  She has COPD which is stable. She is ex-smoker. She does not drink alcohol. She has history of myocardial infarction in the past.  She says that recently she has consulted Dr. Marco A Rodrigues and she had follow-up yesterday with Dr. Marco A Rodrigues and she had EKG and she said that it was normal.  CT abdomen and pelvis with IV contrast was done on fourth March and it was negative for pulmonary embolism and she had cholelithiasis and hepatocellular disease and cirrhosis and no liver mass and mild left colon diverticulosis. She had no bowel obstruction or hydronephrosis no ascites. However discharge diagnosis was saying ascites, and not sure and difficult to palpate in her organs because of morbid obesity. She was diagnosed with ascites with cirrhosis but she says that everything was normal and she was prescribed Zofran and tramadol. Her granddaughter also does not know that she has liver cirrhosis and patient is again explained in details, she has pleasant personality and soft personality. it seems like she has poor insight to understand her medical illness. She used to see hepatologist at [facility] in the past and then she could not go  that far so she was referred to local gastroenterologist several times but she has not done follow-up properly.   She has no negative thoughts and currently no active depression but she does take duloxetine and escitalopram. She is not on any antihypertensive medicines. Her creatinine was elevated in the past.  In ER visit her electrolytes were normal but BUN was 15 with creatinine 1.61 and she is in CKD stage IIIa to stage B and her hemoglobin was normal 12 with white cell count normal and platelets were normal.  I reviewed her labs. Urine was negative for any infection flu test was negative she was negative for COVID and urine was negative for any acute infection. No Known Allergies  Current Outpatient Medications   Medication Sig    diclofenac (VOLTAREN) 1 % gel Apply 4 g to affected area three (3) times daily as needed. zonisamide (ZONEGRAN) 100 mg capsule Take 1 Capsule by mouth nightly. ondansetron (ZOFRAN ODT) 4 mg disintegrating tablet 1 Tablet by SubLINGual route as needed. hydrocortisone (CORTAID) 1 % topical cream Apply 1 g to affected area two (2) times a day. use thin layer    ferrous sulfate (FeroSuL) 325 mg (65 mg iron) tablet Take 1 Tablet by mouth daily. budesonide-formoteroL (Symbicort) 160-4.5 mcg/actuation HFAA Take 2 Puffs by inhalation two (2) times a day. escitalopram oxalate (LEXAPRO) 10 mg tablet Take 1 Tablet by mouth daily. pravastatin (PRAVACHOL) 10 mg tablet Take 1 Tablet by mouth nightly. pantoprazole (PROTONIX) 40 mg tablet Take 1 Tablet by mouth daily. Take 30 minutes before eating once a day. DULoxetine (CYMBALTA) 30 mg capsule Take 1 Capsule by mouth two (2) times a day. docusate sodium (COLACE) 100 mg capsule Take 1 Capsule by mouth two (2) times a day. topiramate (TOPAMAX) 25 mg tablet Take  by mouth two (2) times a day. fluticasone propionate (FLONASE) 50 mcg/actuation nasal spray 2 Sprays by Both Nostrils route daily. triamcinolone acetonide (KENALOG) 0.1 % ointment APPLY TO AFFECTED AREA 2 TIMES A DAY.  USE TWICE A DAY FOR 1 WEEK THEN AS NEEDED FOR THE RASH ON THE TRUNK    SUMAtriptan (IMITREX) 50 mg tablet TAKE 1 TABLET BY MOUTH ONCE AS NEEDED FOR MIGRAINE FOR UP TO 1 DOSE.    anastrozole (ARIMIDEX) 1 mg tablet Take 1 mg by mouth daily. albuterol (PROVENTIL VENTOLIN) 2.5 mg /3 mL (0.083 %) nebu USE 1 VIAL IN NEBULIZER EVERY 4-6 HOURS AS NEEDED    aspirin delayed-release 81 mg tablet Take 1 Tablet by mouth daily. latanoprost (XALATAN) 0.005 % ophthalmic solution Administer 1 Drop to both eyes daily. cyanocobalamin (VITAMIN B12) 500 mcg tablet Take 1 Tablet by mouth daily. cholecalciferol (VITAMIN D3) (1000 Units /25 mcg) tablet Take 1 Tablet by mouth daily. calcium-cholecalciferol, D3, (CALTRATE 600+D) tablet Take 1 Tablet by mouth daily. No current facility-administered medications for this visit.      Past Medical History:   Diagnosis Date    Anemia     Asthma 8/21/2020    Bipolar 1 disorder ([address] 8/21/2020    Breast CA ([facility] 9/29/2021    Chronic bilateral low back pain, unspecified whether sciatica present     Chronic hepatitis ([facility] 8/21/2020    Chronic kidney disease (CKD), stage II (mild) 8/21/2020    Cirrhosis of liver ([address] 8/21/2020    COPD (chronic obstructive pulmonary disease) ([address] 8/21/2020    Depressive disorder 8/21/2020    Edema 8/21/2020    Headache disorder 5/16/2021    History of mood disorder 8/21/2020    Hypertension     Lumbar radiculopathy 8/21/2020    Morbid obesity ([address] 8/21/2020    Osteoarthritis 8/21/2020    Pain, knee 8/21/2020    Pure hypercholesterolemia with target low density lipoprotein (LDL) cholesterol less than 130 mg/dL 8/21/2020    RUQ abdominal pain 8/21/2020    Sleep apnea 8/21/2020    Vitamin D deficiency 8/21/2020     Past Surgical History:   Procedure Laterality Date    HX BREAST BIOPSY Right     benign    HX BREAST LUMPECTOMY Right 9/29/2021    RIGHT PARTIAL MASTECTOMY performed by Atul Peterson MD [address]    HX COLONOSCOPY      HX COLONOSCOPY  1988    HX CYST INCISION AND DRAINAGE  1980    HX GYN      HX TOTAL ABDOMINAL HYSTERECTOMY  1976    IR [address] LIVER NEEDLE W OTHER [address]  2014 Family History   Problem Relation Age of Onset    Hypertension Mother     Hypertension Brother     Heart Disease Brother     Hypertension Maternal Grandmother      Social History     Tobacco Use   Smoking Status Former    Packs/day: 0.50    Years: 30.00    Pack years: 15.00    Types: Cigarettes    Quit date:     Years since quittin.3   Smokeless Tobacco Never     Review of Systems   Constitutional: Negative. HENT:  Negative for nosebleeds and sore throat. Eyes:  Negative for blurred vision. Respiratory:  Negative for cough, shortness of breath and wheezing. Cardiovascular: Negative. Gastrointestinal: Negative. Genitourinary: Negative. Musculoskeletal:  Negative for back pain, falls, joint pain, myalgias and neck pain. Walks with cane and DJD in both knee joints and back   Neurological:  Negative for dizziness, tingling, tremors, sensory change, speech change, focal weakness and headaches. Follow neurologist for headache most likely due to migraine   Endo/Heme/Allergies:  Negative for polydipsia. Does not bruise/bleed easily. Psychiatric/Behavioral: Negative. Anxiety and depression is stable and in remission with current medication regimen. Vitals:    23 1328   Pulse: 69   Temp: 98.3 °F (36.8 °C)   TempSrc: Temporal   SpO2: 99%   Weight: 273 lb (123.8 kg)   Height: 5' (1.524 m)          Physical Exam  Vitals and nursing note reviewed. Constitutional:       General: She is not in acute distress. Appearance: Normal appearance. She is obese. She is not ill-appearing, toxic-appearing or diaphoretic. Eyes:      Pupils: Pupils are equal, round, and reactive to light. Cardiovascular:      Rate and Rhythm: Normal rate and regular rhythm. Pulses: Normal pulses. Heart sounds: Normal heart sounds. Pulmonary:      Effort: Pulmonary effort is normal.      Breath sounds: Normal breath sounds. No rhonchi or rales.    Abdominal:      General: Bowel sounds are normal.      Palpations: Abdomen is soft. Tenderness: There is no abdominal tenderness. There is no guarding. Comments: Abdominal wall obesity and morbid obesity I could not palpate organs. There is no tenderness. Musculoskeletal:         General: No swelling or tenderness. Cervical back: Neck supple. Right lower leg: No edema. Left lower leg: No edema. Skin:     Findings: No bruising. Neurological:      General: No focal deficit present. Mental Status: She is alert and oriented to person, place, and time. Mental status is at baseline. Cranial Nerves: No cranial nerve deficit. Motor: No weakness. Comments: Walking with cane. Psychiatric:         Mood and Affect: Mood normal.         Behavior: Behavior normal.     An electronic signature was used to authenticate this note. -- Eric Tucker MD 1. \"Have you been to the ER, urgent care clinic since your last visit? Hospitalized since your last visit? \" Yes Where: [facility]    2. \"Have you seen or consulted any other health care providers outside of the [address] [place] [address] since your last visit? \" No     3. For patients aged 39-70: Has the patient had a colonoscopy / FIT/ Cologuard? Yes - no Care Gap present      If the patient is female:    4. For patients aged 41-77: Has the patient had a mammogram within the past 2 years? Yes - no Care Gap present       5. For patients aged 21-65: Has the patient had a pap smear?  NA - based on age or sex

## 2023-05-02 ENCOUNTER — TELEPHONE (OUTPATIENT)
Dept: INTERNAL MEDICINE CLINIC | Age: 78
End: 2023-05-02

## 2023-05-03 RX ORDER — LACTULOSE 10 G/15ML
20 SOLUTION ORAL; RECTAL DAILY
Qty: 473 ML | Refills: 1 | Status: SHIPPED | OUTPATIENT
Start: 2023-05-03

## 2023-05-10 ENCOUNTER — TELEPHONE (OUTPATIENT)
Facility: CLINIC | Age: 78
End: 2023-05-10

## 2023-05-24 LAB
25(OH)D3+25(OH)D2 SERPL-MCNC: 31.4 NG/ML (ref 30–100)
ALBUMIN SERPL-MCNC: 4.1 G/DL (ref 3.7–4.7)
ALBUMIN/GLOB SERPL: 1.3 {RATIO} (ref 1.2–2.2)
ALP SERPL-CCNC: 99 IU/L (ref 44–121)
ALT SERPL-CCNC: 8 IU/L (ref 0–32)
AST SERPL-CCNC: 17 IU/L (ref 0–40)
BASOPHILS # BLD AUTO: 0 X10E3/UL (ref 0–0.2)
BASOPHILS NFR BLD AUTO: 0 %
BILIRUB SERPL-MCNC: 0.3 MG/DL (ref 0–1.2)
BUN SERPL-MCNC: 18 MG/DL (ref 8–27)
BUN/CREAT SERPL: 14 (ref 12–28)
CALCIUM SERPL-MCNC: 10 MG/DL (ref 8.7–10.3)
CHLORIDE SERPL-SCNC: 107 MMOL/L (ref 96–106)
CHOLEST SERPL-MCNC: 208 MG/DL (ref 100–199)
CO2 SERPL-SCNC: 20 MMOL/L (ref 20–29)
CREAT SERPL-MCNC: 1.29 MG/DL (ref 0.57–1)
EGFRCR SERPLBLD CKD-EPI 2021: 43 ML/MIN/1.73
EOSINOPHIL # BLD AUTO: 0 X10E3/UL (ref 0–0.4)
EOSINOPHIL NFR BLD AUTO: 0 %
ERYTHROCYTE [DISTWIDTH] IN BLOOD BY AUTOMATED COUNT: 13.9 % (ref 11.7–15.4)
GLOBULIN SER CALC-MCNC: 3.2 G/DL (ref 1.5–4.5)
GLUCOSE SERPL-MCNC: 103 MG/DL (ref 70–99)
HCT VFR BLD AUTO: 36.6 % (ref 34–46.6)
HDLC SERPL-MCNC: 80 MG/DL
HGB BLD-MCNC: 11.2 G/DL (ref 11.1–15.9)
IMM GRANULOCYTES # BLD AUTO: 0 X10E3/UL (ref 0–0.1)
IMM GRANULOCYTES NFR BLD AUTO: 1 %
LDLC SERPL CALC-MCNC: 120 MG/DL (ref 0–99)
LYMPHOCYTES # BLD AUTO: 0.8 X10E3/UL (ref 0.7–3.1)
LYMPHOCYTES NFR BLD AUTO: 9 %
MCH RBC QN AUTO: 23.2 PG (ref 26.6–33)
MCHC RBC AUTO-ENTMCNC: 30.6 G/DL (ref 31.5–35.7)
MCV RBC AUTO: 76 FL (ref 79–97)
MONOCYTES # BLD AUTO: 0.3 X10E3/UL (ref 0.1–0.9)
MONOCYTES NFR BLD AUTO: 4 %
NEUTROPHILS # BLD AUTO: 7.7 X10E3/UL (ref 1.4–7)
NEUTROPHILS NFR BLD AUTO: 86 %
PLATELET # BLD AUTO: 269 X10E3/UL (ref 150–450)
POTASSIUM SERPL-SCNC: 4.6 MMOL/L (ref 3.5–5.2)
PROT SERPL-MCNC: 7.3 G/DL (ref 6–8.5)
RBC # BLD AUTO: 4.83 X10E6/UL (ref 3.77–5.28)
SODIUM SERPL-SCNC: 139 MMOL/L (ref 134–144)
TRIGL SERPL-MCNC: 43 MG/DL (ref 0–149)
TSH SERPL DL<=0.005 MIU/L-ACNC: 1.19 UIU/ML (ref 0.45–4.5)
VLDLC SERPL CALC-MCNC: 8 MG/DL (ref 5–40)
WBC # BLD AUTO: 8.8 X10E3/UL (ref 3.4–10.8)

## 2023-06-02 ENCOUNTER — TELEPHONE (OUTPATIENT)
Facility: CLINIC | Age: 78
End: 2023-06-02

## 2023-06-02 DIAGNOSIS — H54.7 VISION PROBLEM: Primary | ICD-10-CM

## 2023-06-02 NOTE — TELEPHONE ENCOUNTER
----- Message from Mariya Garrett sent at 6/1/2023 10:19 AM EDT -----  Subject: Referral Request    Reason for referral request? Patients granddasyed Ramirez) is   requesting a referral for the patient to see an eye doctor. Provider patient wants to be referred to(if known):     Provider Phone Number(if known):     Additional Information for Provider?   ---------------------------------------------------------------------------  --------------  [id number]    [phone]; OK to leave message on voicemail  ---------------------------------------------------------------------------  --------------

## 2023-06-06 ENCOUNTER — TELEPHONE (OUTPATIENT)
Facility: CLINIC | Age: 78
End: 2023-06-06

## 2023-06-06 NOTE — TELEPHONE ENCOUNTER
Advised of labs. Patient said she you were supposed to let her know if she needs to see specialist once you checked her liver function.

## 2023-07-13 ENCOUNTER — TELEPHONE (OUTPATIENT)
Facility: CLINIC | Age: 78
End: 2023-07-13

## 2023-07-13 RX ORDER — OMEPRAZOLE 40 MG/1
40 CAPSULE, DELAYED RELEASE ORAL
Qty: 30 CAPSULE | Refills: 2 | Status: SHIPPED | OUTPATIENT
Start: 2023-07-13

## 2023-07-13 RX ORDER — DICYCLOMINE HYDROCHLORIDE 10 MG/1
10 CAPSULE ORAL 2 TIMES DAILY PRN
Qty: 30 CAPSULE | Refills: 0 | Status: SHIPPED | OUTPATIENT
Start: 2023-07-13

## 2023-07-13 RX ORDER — AZELASTINE 1 MG/ML
2 SPRAY, METERED NASAL 2 TIMES DAILY
Qty: 60 ML | Refills: 1 | Status: SHIPPED | OUTPATIENT
Start: 2023-07-13

## 2023-07-13 RX ORDER — LORATADINE 10 MG/1
10 TABLET ORAL DAILY
Qty: 30 TABLET | Refills: 1 | Status: SHIPPED | OUTPATIENT
Start: 2023-07-13

## 2023-07-13 NOTE — TELEPHONE ENCOUNTER
Called and spoke to pt. Pt says she has had pain like this before. She says it is not bad enough to go to ER but she wants Dr. Candance Grieves to see her. Pt says she has had Bm today. She doesn't have any fever but her throat is sore as well. Please advise. ..

## 2023-07-13 NOTE — TELEPHONE ENCOUNTER
Called and spoke to pt. Pt verbalized understanding of her instructions. She will get loratadine OTC and Dr. Lebron Kwan will send her a rx for dicyclomine for her stomach. She needs to stay hydrated, and if pain gets worse in her abd she is to go to ER. She will do warm salt water gargles. Pt will let us know if she doesn't feel better and needs office visit or if she chooses to go to ER.  Thank you

## 2023-07-13 NOTE — TELEPHONE ENCOUNTER
Patient called and stated that she is having stomach, right side and throat pain. Please advise.     Call back [phone]

## 2023-07-18 RX ORDER — BUDESONIDE AND FORMOTEROL FUMARATE DIHYDRATE 160; 4.5 UG/1; UG/1
AEROSOL RESPIRATORY (INHALATION)
Qty: 10.2 G | Refills: 1 | Status: SHIPPED | OUTPATIENT
Start: 2023-07-18 | End: 2023-09-13

## 2023-07-18 RX ORDER — FERROUS SULFATE 325(65) MG
TABLET ORAL
Qty: 30 TABLET | Refills: 1 | Status: SHIPPED | OUTPATIENT
Start: 2023-07-18 | End: 2023-09-13

## 2023-07-18 RX ORDER — ESCITALOPRAM OXALATE 10 MG/1
TABLET ORAL
Qty: 30 TABLET | Refills: 1 | Status: SHIPPED | OUTPATIENT
Start: 2023-07-18 | End: 2023-09-13

## 2023-07-19 ENCOUNTER — OFFICE VISIT (OUTPATIENT)
Facility: CLINIC | Age: 78
End: 2023-07-19
Payer: MEDICAID

## 2023-07-19 VITALS
HEIGHT: 60 IN | BODY MASS INDEX: 54.97 KG/M2 | OXYGEN SATURATION: 98 % | DIASTOLIC BLOOD PRESSURE: 70 MMHG | TEMPERATURE: 97 F | SYSTOLIC BLOOD PRESSURE: 120 MMHG | WEIGHT: 280 LBS | HEART RATE: 84 BPM

## 2023-07-19 DIAGNOSIS — F32.A DEPRESSIVE DISORDER: ICD-10-CM

## 2023-07-19 DIAGNOSIS — M54.16 LUMBAR RADICULOPATHY: ICD-10-CM

## 2023-07-19 DIAGNOSIS — M17.0 PRIMARY OSTEOARTHRITIS OF BOTH KNEES: ICD-10-CM

## 2023-07-19 DIAGNOSIS — G89.29 CHRONIC BILATERAL LOW BACK PAIN, UNSPECIFIED WHETHER SCIATICA PRESENT: ICD-10-CM

## 2023-07-19 DIAGNOSIS — N18.32 STAGE 3B CHRONIC KIDNEY DISEASE (HCC): ICD-10-CM

## 2023-07-19 DIAGNOSIS — G89.29 CHRONIC ABDOMINAL PAIN: ICD-10-CM

## 2023-07-19 DIAGNOSIS — F41.9 ANXIETY: ICD-10-CM

## 2023-07-19 DIAGNOSIS — Z85.3 HISTORY OF BREAST CANCER: ICD-10-CM

## 2023-07-19 DIAGNOSIS — F10.11 HISTORY OF ALCOHOL ABUSE: ICD-10-CM

## 2023-07-19 DIAGNOSIS — Z87.19 HISTORY OF ESOPHAGEAL VARICES: ICD-10-CM

## 2023-07-19 DIAGNOSIS — K21.9 GASTROESOPHAGEAL REFLUX DISEASE WITHOUT ESOPHAGITIS: ICD-10-CM

## 2023-07-19 DIAGNOSIS — R10.9 CHRONIC ABDOMINAL PAIN: ICD-10-CM

## 2023-07-19 DIAGNOSIS — Z79.811 LONG TERM CURRENT USE OF AROMATASE INHIBITOR: ICD-10-CM

## 2023-07-19 DIAGNOSIS — M16.0 PRIMARY OSTEOARTHRITIS OF BOTH HIPS: ICD-10-CM

## 2023-07-19 DIAGNOSIS — R51.9 HEADACHE DISORDER: ICD-10-CM

## 2023-07-19 DIAGNOSIS — M54.50 CHRONIC BILATERAL LOW BACK PAIN, UNSPECIFIED WHETHER SCIATICA PRESENT: ICD-10-CM

## 2023-07-19 DIAGNOSIS — K74.60 CIRRHOSIS OF LIVER WITHOUT ASCITES, UNSPECIFIED HEPATIC CIRRHOSIS TYPE (HCC): ICD-10-CM

## 2023-07-19 DIAGNOSIS — J44.9 CHRONIC OBSTRUCTIVE PULMONARY DISEASE, UNSPECIFIED COPD TYPE (HCC): ICD-10-CM

## 2023-07-19 DIAGNOSIS — I25.2 HISTORY OF HEART ATTACK: ICD-10-CM

## 2023-07-19 DIAGNOSIS — Z86.59 HISTORY OF BIPOLAR DISORDER: ICD-10-CM

## 2023-07-19 DIAGNOSIS — Z86.19 HISTORY OF HEPATITIS C: ICD-10-CM

## 2023-07-19 PROCEDURE — 3074F SYST BP LT 130 MM HG: CPT | Performed by: INTERNAL MEDICINE

## 2023-07-19 PROCEDURE — 99214 OFFICE O/P EST MOD 30 MIN: CPT | Performed by: INTERNAL MEDICINE

## 2023-07-19 PROCEDURE — 3078F DIAST BP <80 MM HG: CPT | Performed by: INTERNAL MEDICINE

## 2023-07-19 PROCEDURE — 1123F ACP DISCUSS/DSCN MKR DOCD: CPT | Performed by: INTERNAL MEDICINE

## 2023-07-19 SDOH — ECONOMIC STABILITY: FOOD INSECURITY: WITHIN THE PAST 12 MONTHS, THE FOOD YOU BOUGHT JUST DIDN'T LAST AND YOU DIDN'T HAVE MONEY TO GET MORE.: NEVER TRUE

## 2023-07-19 SDOH — ECONOMIC STABILITY: FOOD INSECURITY: WITHIN THE PAST 12 MONTHS, YOU WORRIED THAT YOUR FOOD WOULD RUN OUT BEFORE YOU GOT MONEY TO BUY MORE.: NEVER TRUE

## 2023-07-19 SDOH — ECONOMIC STABILITY: INCOME INSECURITY: HOW HARD IS IT FOR YOU TO PAY FOR THE VERY BASICS LIKE FOOD, HOUSING, MEDICAL CARE, AND HEATING?: NOT HARD AT ALL

## 2023-07-19 SDOH — ECONOMIC STABILITY: HOUSING INSECURITY
IN THE LAST 12 MONTHS, WAS THERE A TIME WHEN YOU DID NOT HAVE A STEADY PLACE TO SLEEP OR SLEPT IN A SHELTER (INCLUDING NOW)?: NO

## 2023-07-19 ASSESSMENT — ENCOUNTER SYMPTOMS
DIARRHEA: 0
EYES NEGATIVE: 1
RESPIRATORY NEGATIVE: 1
BACK PAIN: 1
BLOOD IN STOOL: 0
VOMITING: 0
GASTROINTESTINAL NEGATIVE: 1
ANAL BLEEDING: 0
NAUSEA: 0

## 2023-07-20 ENCOUNTER — CLINICAL DOCUMENTATION (OUTPATIENT)
Age: 78
End: 2023-07-20

## 2023-07-20 NOTE — PROGRESS NOTES
Referral reviewed, per office protocol, does not meet criteria for further review. Schedule next available.

## 2023-07-21 ENCOUNTER — TELEPHONE (OUTPATIENT)
Facility: CLINIC | Age: 78
End: 2023-07-21

## 2023-07-21 NOTE — TELEPHONE ENCOUNTER
Patient's daughter called and stated that the Gastroenterologist that she was referred to will not have an appointment until Feb. She wants another referral to another Gastroenterology so she can be seen sooner.

## 2023-07-24 DIAGNOSIS — K74.60 CIRRHOSIS OF LIVER WITHOUT ASCITES, UNSPECIFIED HEPATIC CIRRHOSIS TYPE (HCC): Primary | ICD-10-CM

## 2023-08-10 RX ORDER — TRIAMCINOLONE ACETONIDE 1 MG/G
OINTMENT TOPICAL
Qty: 45 G | Refills: 5 | Status: SHIPPED | OUTPATIENT
Start: 2023-08-10

## 2023-08-14 ENCOUNTER — TELEPHONE (OUTPATIENT)
Facility: CLINIC | Age: 78
End: 2023-08-14

## 2023-08-14 DIAGNOSIS — M54.50 CHRONIC BILATERAL LOW BACK PAIN, UNSPECIFIED WHETHER SCIATICA PRESENT: Primary | ICD-10-CM

## 2023-08-14 DIAGNOSIS — Z01.00 ENCOUNTER FOR COMPLETE EYE EXAM: ICD-10-CM

## 2023-08-14 DIAGNOSIS — G89.29 CHRONIC BILATERAL LOW BACK PAIN, UNSPECIFIED WHETHER SCIATICA PRESENT: Primary | ICD-10-CM

## 2023-08-14 NOTE — TELEPHONE ENCOUNTER
Grand-daughter called for Ml Flores to find out if the doctor could send a referral for physical therapy for her back and a referral for an eye doctor.  She is also requesting info about weight loss

## 2023-08-15 RX ORDER — PRAVASTATIN SODIUM 20 MG
TABLET ORAL
Qty: 30 TABLET | Refills: 5 | Status: SHIPPED | OUTPATIENT
Start: 2023-08-15

## 2023-09-05 ENCOUNTER — TRANSCRIBE ORDERS (OUTPATIENT)
Facility: HOSPITAL | Age: 78
End: 2023-09-05

## 2023-09-05 ENCOUNTER — HOSPITAL ENCOUNTER (OUTPATIENT)
Facility: HOSPITAL | Age: 78
Discharge: HOME | End: 2023-09-08
Payer: MEDICAID

## 2023-09-05 ENCOUNTER — CLINICAL DOCUMENTATION (OUTPATIENT)
Facility: HOSPITAL | Age: 78
End: 2023-09-05

## 2023-09-05 DIAGNOSIS — K74.60 HEPATIC CIRRHOSIS, UNSPECIFIED HEPATIC CIRRHOSIS TYPE, UNSPECIFIED WHETHER ASCITES PRESENT (HCC): Primary | ICD-10-CM

## 2023-09-05 DIAGNOSIS — K74.60 HEPATIC CIRRHOSIS, UNSPECIFIED HEPATIC CIRRHOSIS TYPE, UNSPECIFIED WHETHER ASCITES PRESENT (HCC): ICD-10-CM

## 2023-09-05 PROCEDURE — 82105 ALPHA-FETOPROTEIN SERUM: CPT

## 2023-09-05 PROCEDURE — 80074 ACUTE HEPATITIS PANEL: CPT

## 2023-09-05 PROCEDURE — 80076 HEPATIC FUNCTION PANEL: CPT

## 2023-09-05 RX ORDER — LORATADINE 10 MG/1
TABLET ORAL
Qty: 30 TABLET | Refills: 5 | Status: SHIPPED | OUTPATIENT
Start: 2023-09-05

## 2023-09-05 RX ORDER — DICYCLOMINE HYDROCHLORIDE 10 MG/1
CAPSULE ORAL
Qty: 30 CAPSULE | Refills: 0 | Status: SHIPPED | OUTPATIENT
Start: 2023-09-05

## 2023-09-06 ENCOUNTER — HOSPITAL ENCOUNTER (EMERGENCY)
Facility: HOSPITAL | Age: 78
Discharge: HOME | End: 2023-09-06
Attending: EMERGENCY MEDICINE
Payer: MEDICAID

## 2023-09-06 VITALS
OXYGEN SATURATION: 100 % | WEIGHT: 276 LBS | HEART RATE: 71 BPM | DIASTOLIC BLOOD PRESSURE: 70 MMHG | TEMPERATURE: 98.2 F | RESPIRATION RATE: 16 BRPM | SYSTOLIC BLOOD PRESSURE: 140 MMHG | BODY MASS INDEX: 55.64 KG/M2 | HEIGHT: 59 IN

## 2023-09-06 DIAGNOSIS — R10.84 GENERALIZED ABDOMINAL PAIN: Primary | ICD-10-CM

## 2023-09-06 DIAGNOSIS — N30.00 ACUTE CYSTITIS WITHOUT HEMATURIA: ICD-10-CM

## 2023-09-06 LAB
-: ABNORMAL
ALBUMIN SERPL-MCNC: 3.4 G/DL (ref 3.5–5)
ALBUMIN SERPL-MCNC: 3.6 G/DL (ref 3.5–5)
ALBUMIN/GLOB SERPL: 0.7 (ref 1.1–2.2)
ALBUMIN/GLOB SERPL: 0.9 (ref 1.1–2.2)
ALP SERPL-CCNC: 85 U/L (ref 45–117)
ALP SERPL-CCNC: 87 U/L (ref 45–117)
ALT SERPL-CCNC: 12 U/L (ref 12–78)
ALT SERPL-CCNC: 12 U/L (ref 12–78)
ANION GAP SERPL CALC-SCNC: 6 MMOL/L (ref 5–15)
APPEARANCE UR: CLEAR
APTT PPP: 32.1 SEC (ref 21.2–34.1)
AST SERPL W P-5'-P-CCNC: 15 U/L (ref 15–37)
AST SERPL W P-5'-P-CCNC: 17 U/L (ref 15–37)
BACTERIA URNS QL MICRO: ABNORMAL /HPF
BASOPHILS # BLD: 0 K/UL (ref 0–0.1)
BASOPHILS NFR BLD: 1 % (ref 0–1)
BILIRUB DIRECT SERPL-MCNC: 0.1 MG/DL (ref 0–0.2)
BILIRUB SERPL-MCNC: 0.4 MG/DL (ref 0.2–1)
BILIRUB SERPL-MCNC: 0.5 MG/DL (ref 0.2–1)
BILIRUB UR QL: NEGATIVE
BNP SERPL-MCNC: 105 PG/ML
BUN SERPL-MCNC: 17 MG/DL (ref 6–20)
BUN/CREAT SERPL: 11 (ref 12–20)
CA-I BLD-MCNC: 9.5 MG/DL (ref 8.5–10.1)
CHLORIDE SERPL-SCNC: 110 MMOL/L (ref 97–108)
CO2 SERPL-SCNC: 27 MMOL/L (ref 21–32)
COLOR UR: ABNORMAL
CREAT SERPL-MCNC: 1.52 MG/DL (ref 0.55–1.02)
DIFFERENTIAL METHOD BLD: ABNORMAL
EOSINOPHIL # BLD: 0.3 K/UL (ref 0–0.4)
EOSINOPHIL NFR BLD: 5 % (ref 0–7)
EPITH CASTS URNS QL MICRO: ABNORMAL /LPF
ERYTHROCYTE [DISTWIDTH] IN BLOOD BY AUTOMATED COUNT: 13.8 % (ref 11.5–14.5)
GLOBULIN SER CALC-MCNC: 3.9 G/DL (ref 2–4)
GLOBULIN SER CALC-MCNC: 4.6 G/DL (ref 2–4)
GLUCOSE BLD STRIP.AUTO-MCNC: 87 MG/DL (ref 65–100)
GLUCOSE SERPL-MCNC: 93 MG/DL (ref 65–100)
GLUCOSE UR STRIP.AUTO-MCNC: NEGATIVE MG/DL
HAV IGM SER QL: NONREACTIVE
HBV CORE IGM SER QL: NONREACTIVE
HBV SURFACE AG SER QL: <0.1 INDEX
HBV SURFACE AG SER QL: NEGATIVE
HCT VFR BLD AUTO: 36.1 % (ref 35–47)
HCV AB SER IA-ACNC: >11 INDEX
HCV AB SERPL QL IA: REACTIVE
HGB BLD-MCNC: 11 G/DL (ref 11.5–16)
HGB UR QL STRIP: NEGATIVE
IMM GRANULOCYTES # BLD AUTO: 0 K/UL (ref 0–0.04)
IMM GRANULOCYTES NFR BLD AUTO: 0 % (ref 0–0.5)
INR PPP: 1.1 (ref 0.9–1.1)
KETONES UR QL STRIP.AUTO: NEGATIVE MG/DL
LEUKOCYTE ESTERASE UR QL STRIP.AUTO: ABNORMAL
LIPASE SERPL-CCNC: 64 U/L (ref 73–393)
LYMPHOCYTES # BLD: 1.1 K/UL (ref 0.8–3.5)
LYMPHOCYTES NFR BLD: 17 % (ref 12–49)
MCH RBC QN AUTO: 22.9 PG (ref 26–34)
MCHC RBC AUTO-ENTMCNC: 30.5 G/DL (ref 30–36.5)
MCV RBC AUTO: 75.1 FL (ref 80–99)
MONOCYTES # BLD: 0.4 K/UL (ref 0–1)
MONOCYTES NFR BLD: 6 % (ref 5–13)
MUCOUS THREADS URNS QL MICRO: ABNORMAL /LPF
NEUTS SEG # BLD: 4.5 K/UL (ref 1.8–8)
NEUTS SEG NFR BLD: 71 % (ref 32–75)
NITRITE UR QL STRIP.AUTO: NEGATIVE
NRBC # BLD: 0 K/UL (ref 0–0.01)
NRBC BLD-RTO: 0 PER 100 WBC
PERFORMED BY:: NORMAL
PH UR STRIP: 6 (ref 5–8)
PLATELET # BLD AUTO: 270 K/UL (ref 150–400)
PMV BLD AUTO: 9.5 FL (ref 8.9–12.9)
POTASSIUM SERPL-SCNC: 4.1 MMOL/L (ref 3.5–5.1)
PROT SERPL-MCNC: 7.5 G/DL (ref 6.4–8.2)
PROT SERPL-MCNC: 8 G/DL (ref 6.4–8.2)
PROT UR STRIP-MCNC: NEGATIVE MG/DL
PROTHROMBIN TIME: 14.6 SEC (ref 11.9–14.6)
RBC # BLD AUTO: 4.81 M/UL (ref 3.8–5.2)
RBC #/AREA URNS HPF: ABNORMAL /HPF (ref 0–5)
SODIUM SERPL-SCNC: 143 MMOL/L (ref 136–145)
SP GR UR REFRACTOMETRY: 1.02 (ref 1–1.03)
THERAPEUTIC RANGE: NORMAL SEC (ref 82–109)
TROPONIN I SERPL HS-MCNC: 7 NG/L (ref 0–51)
URINE CULTURE IF INDICATED: ABNORMAL
UROBILINOGEN UR QL STRIP.AUTO: 4 EU/DL (ref 0.1–1)
WBC # BLD AUTO: 6.3 K/UL (ref 3.6–11)
WBC URNS QL MICRO: ABNORMAL /HPF (ref 0–4)

## 2023-09-06 PROCEDURE — 85025 COMPLETE CBC W/AUTO DIFF WBC: CPT

## 2023-09-06 PROCEDURE — 82962 GLUCOSE BLOOD TEST: CPT

## 2023-09-06 PROCEDURE — 83690 ASSAY OF LIPASE: CPT

## 2023-09-06 PROCEDURE — 6360000002 HC RX W HCPCS: Performed by: EMERGENCY MEDICINE

## 2023-09-06 PROCEDURE — 99284 EMERGENCY DEPT VISIT MOD MDM: CPT

## 2023-09-06 PROCEDURE — 85730 THROMBOPLASTIN TIME PARTIAL: CPT

## 2023-09-06 PROCEDURE — 36415 COLL VENOUS BLD VENIPUNCTURE: CPT

## 2023-09-06 PROCEDURE — 93005 ELECTROCARDIOGRAM TRACING: CPT | Performed by: EMERGENCY MEDICINE

## 2023-09-06 PROCEDURE — 80053 COMPREHEN METABOLIC PANEL: CPT

## 2023-09-06 PROCEDURE — 84484 ASSAY OF TROPONIN QUANT: CPT

## 2023-09-06 PROCEDURE — 2580000003 HC RX 258: Performed by: EMERGENCY MEDICINE

## 2023-09-06 PROCEDURE — 6370000000 HC RX 637 (ALT 250 FOR IP): Performed by: EMERGENCY MEDICINE

## 2023-09-06 PROCEDURE — 81001 URINALYSIS AUTO W/SCOPE: CPT

## 2023-09-06 PROCEDURE — 85610 PROTHROMBIN TIME: CPT

## 2023-09-06 PROCEDURE — 83880 ASSAY OF NATRIURETIC PEPTIDE: CPT

## 2023-09-06 RX ORDER — CEPHALEXIN 500 MG/1
500 CAPSULE ORAL 2 TIMES DAILY
Qty: 14 CAPSULE | Refills: 0 | Status: SHIPPED | OUTPATIENT
Start: 2023-09-06 | End: 2023-09-13

## 2023-09-06 RX ORDER — OXYCODONE HYDROCHLORIDE 5 MG/1
5 TABLET ORAL
Status: COMPLETED | OUTPATIENT
Start: 2023-09-06 | End: 2023-09-06

## 2023-09-06 RX ADMIN — OXYCODONE HYDROCHLORIDE 5 MG: 5 TABLET ORAL at 11:49

## 2023-09-06 RX ADMIN — CEFTRIAXONE SODIUM 1000 MG: 1 INJECTION, POWDER, FOR SOLUTION INTRAMUSCULAR; INTRAVENOUS at 15:15

## 2023-09-06 ASSESSMENT — PAIN SCALES - GENERAL
PAINLEVEL_OUTOF10: 8
PAINLEVEL_OUTOF10: 7

## 2023-09-06 ASSESSMENT — PAIN - FUNCTIONAL ASSESSMENT: PAIN_FUNCTIONAL_ASSESSMENT: 0-10

## 2023-09-06 ASSESSMENT — PAIN DESCRIPTION - LOCATION: LOCATION: ABDOMEN

## 2023-09-06 NOTE — ED PROVIDER NOTES
TIMES A DAY     topiramate 25 MG tablet  Commonly known as: TOPAMAX     triamcinolone 0.1 % ointment  Commonly known as: KENALOG  APPLY TO RASH ON TRUNK TWO TIMES A DAY IF NEEDED. vitamin D 25 MCG (1000 UT) Tabs tablet  Commonly known as: CHOLECALCIFEROL           * This list has 2 medication(s) that are the same as other medications prescribed for you. Read the directions carefully, and ask your doctor or other care provider to review them with you. Where to Get Your Medications        These medications were sent to [address] [place] [address], [address] [place] [address] [phone] [address], [facility]on Willams      Phone: [phone]   cephALEXin 500 MG capsule           DISCONTINUED MEDICATIONS:  Discharge Medication List as of 9/6/2023  3:18 PM          I am the Primary Clinician of Record: Chicho Boggs DO (electronically signed)    (Please note that parts of this dictation were completed with voice recognition software. Quite often unanticipated grammatical, syntax, homophones, and other interpretive errors are inadvertently transcribed by the computer software. Please disregards these errors.  Please excuse any errors that have escaped final proofreading.)     Chicho Boggs DO  09/06/23 [id number]

## 2023-09-06 NOTE — ED TRIAGE NOTES
Pt came in with a hx of cirrhosis of the liver, abd pain and radiates to the left side and has been going on for a week, a knot on her neck noticed on Sunday

## 2023-09-06 NOTE — DISCHARGE INSTRUCTIONS
0.4 0.2 - 1.0 mg/dL    AST 17 15 - 37 U/L    ALT 12 12 - 78 U/L    Alk Phosphatase 87 45 - 117 U/L    Total Protein 7.5 6.4 - 8.2 g/dL    Albumin 3.6 3.5 - 5.0 g/dL    Globulin 3.9 2.0 - 4.0 g/dL    Albumin/Globulin Ratio 0.9 (L) 1.1 - 2.2     Lipase    Collection Time: 09/06/23 10:59 AM   Result Value Ref Range    Lipase 64 (L) 73 - 393 U/L   Protime-INR    Collection Time: 09/06/23 10:59 AM   Result Value Ref Range    Protime 14.6 11.9 - 14.6 sec    INR 1.1 0.9 - 1.1     Troponin    Collection Time: 09/06/23 10:59 AM   Result Value Ref Range    Troponin, High Sensitivity 7 0 - 51 ng/L   Brain Natriuretic Peptide    Collection Time: 09/06/23 10:59 AM   Result Value Ref Range    NT Pro- <450 pg/mL   APTT    Collection Time: 09/06/23 10:59 AM   Result Value Ref Range    PTT 32.1 21.2 - 34.1 sec    Therapeutic Range   82 - 109 sec   POCT Glucose    Collection Time: 09/06/23  1:49 PM   Result Value Ref Range    POC Glucose 87 65 - 100 mg/dL    Performed by: Loreto Palacios    Urinalysis with Reflex to Culture    Collection Time: 09/06/23  2:09 PM    Specimen: Urine   Result Value Ref Range    Color, UA Yellow/Straw      Appearance Clear Clear      Specific Gravity, UA 1.016 1.003 - 1.030      pH, Urine 6.0 5.0 - 8.0      Protein, UA Negative Negative mg/dL    Glucose, UA Negative Negative mg/dL    Ketones, Urine Negative Negative mg/dL    Bilirubin Urine Negative Negative      Blood, Urine Negative Negative      Urobilinogen, Urine 4.0 (H) 0.1 - 1.0 EU/dL    Nitrite, Urine Negative Negative      Leukocyte Esterase, Urine Trace (A) Negative      WBC, UA 0-4 0 - 4 /hpf    RBC, UA 0-5 0 - 5 /hpf    Epithelial Cells UA Many (A) Few /lpf    BACTERIA, URINE 1+ (A) Negative /hpf    Urine Culture if Indicated Culture not indicated by UA result Culture not indicated by UA result      Mucus, UA 2+ (A) Negative /lpf       Radiologic Studies  No orders to display

## 2023-09-08 ENCOUNTER — OFFICE VISIT (OUTPATIENT)
Facility: CLINIC | Age: 78
End: 2023-09-08
Payer: MEDICAID

## 2023-09-08 VITALS
DIASTOLIC BLOOD PRESSURE: 82 MMHG | BODY MASS INDEX: 57.54 KG/M2 | RESPIRATION RATE: 16 BRPM | HEART RATE: 80 BPM | WEIGHT: 285.4 LBS | OXYGEN SATURATION: 99 % | TEMPERATURE: 97.9 F | HEIGHT: 59 IN | SYSTOLIC BLOOD PRESSURE: 148 MMHG

## 2023-09-08 DIAGNOSIS — K74.60 CIRRHOSIS OF LIVER WITHOUT ASCITES, UNSPECIFIED HEPATIC CIRRHOSIS TYPE (HCC): ICD-10-CM

## 2023-09-08 DIAGNOSIS — G89.29 CHRONIC BILATERAL LOW BACK PAIN, UNSPECIFIED WHETHER SCIATICA PRESENT: ICD-10-CM

## 2023-09-08 DIAGNOSIS — Z79.811 LONG TERM CURRENT USE OF AROMATASE INHIBITOR: ICD-10-CM

## 2023-09-08 DIAGNOSIS — N18.32 STAGE 3B CHRONIC KIDNEY DISEASE (HCC): ICD-10-CM

## 2023-09-08 DIAGNOSIS — R51.9 HEADACHE DISORDER: ICD-10-CM

## 2023-09-08 DIAGNOSIS — G47.30 SLEEP APNEA, UNSPECIFIED TYPE: ICD-10-CM

## 2023-09-08 DIAGNOSIS — Z86.19 HISTORY OF HEPATITIS C: ICD-10-CM

## 2023-09-08 DIAGNOSIS — I10 ESSENTIAL HYPERTENSION: ICD-10-CM

## 2023-09-08 DIAGNOSIS — N30.00 ACUTE CYSTITIS WITHOUT HEMATURIA: ICD-10-CM

## 2023-09-08 DIAGNOSIS — M54.50 CHRONIC BILATERAL LOW BACK PAIN, UNSPECIFIED WHETHER SCIATICA PRESENT: ICD-10-CM

## 2023-09-08 DIAGNOSIS — Z86.59 HISTORY OF BIPOLAR DISORDER: ICD-10-CM

## 2023-09-08 DIAGNOSIS — M17.0 PRIMARY OSTEOARTHRITIS OF BOTH KNEES: ICD-10-CM

## 2023-09-08 DIAGNOSIS — Z85.3 HISTORY OF BREAST CANCER: ICD-10-CM

## 2023-09-08 DIAGNOSIS — K21.9 GASTROESOPHAGEAL REFLUX DISEASE WITHOUT ESOPHAGITIS: ICD-10-CM

## 2023-09-08 DIAGNOSIS — J44.9 CHRONIC OBSTRUCTIVE PULMONARY DISEASE, UNSPECIFIED COPD TYPE (HCC): ICD-10-CM

## 2023-09-08 DIAGNOSIS — D17.1 LIPOMA OF TORSO: ICD-10-CM

## 2023-09-08 DIAGNOSIS — F10.11 HISTORY OF ALCOHOL ABUSE: ICD-10-CM

## 2023-09-08 PROBLEM — E66.01 MORBID OBESITY (HCC): Status: RESOLVED | Noted: 2020-08-21 | Resolved: 2023-09-08

## 2023-09-08 PROBLEM — K73.9 CHRONIC HEPATITIS (HCC): Status: RESOLVED | Noted: 2020-08-21 | Resolved: 2023-09-08

## 2023-09-08 LAB
AFP-TM SERPL-MCNC: 2.1 NG/ML (ref 0–9.2)
EKG ATRIAL RATE: 69 BPM
EKG DIAGNOSIS: NORMAL
EKG P AXIS: 54 DEGREES
EKG P-R INTERVAL: 192 MS
EKG Q-T INTERVAL: 434 MS
EKG QRS DURATION: 90 MS
EKG QTC CALCULATION (BAZETT): 465 MS
EKG R AXIS: 43 DEGREES
EKG T AXIS: 28 DEGREES
EKG VENTRICULAR RATE: 69 BPM

## 2023-09-08 PROCEDURE — 1123F ACP DISCUSS/DSCN MKR DOCD: CPT | Performed by: INTERNAL MEDICINE

## 2023-09-08 PROCEDURE — 99214 OFFICE O/P EST MOD 30 MIN: CPT | Performed by: INTERNAL MEDICINE

## 2023-09-08 PROCEDURE — 3079F DIAST BP 80-89 MM HG: CPT | Performed by: INTERNAL MEDICINE

## 2023-09-08 PROCEDURE — 3077F SYST BP >= 140 MM HG: CPT | Performed by: INTERNAL MEDICINE

## 2023-09-08 RX ORDER — AMLODIPINE BESYLATE 5 MG/1
5 TABLET ORAL DAILY
Qty: 30 TABLET | Refills: 5 | Status: SHIPPED | OUTPATIENT
Start: 2023-09-08

## 2023-09-08 RX ORDER — ZONISAMIDE 100 MG/1
CAPSULE ORAL
COMMUNITY
Start: 2023-07-28

## 2023-09-08 RX ORDER — SUMATRIPTAN 100 MG/1
TABLET, FILM COATED ORAL
COMMUNITY
Start: 2023-06-29

## 2023-09-08 ASSESSMENT — ENCOUNTER SYMPTOMS
EYES NEGATIVE: 1
GASTROINTESTINAL NEGATIVE: 1
RESPIRATORY NEGATIVE: 1
ALLERGIC/IMMUNOLOGIC NEGATIVE: 1

## 2023-09-13 RX ORDER — FERROUS SULFATE 325(65) MG
TABLET ORAL
Qty: 30 TABLET | Refills: 5 | Status: SHIPPED | OUTPATIENT
Start: 2023-09-13

## 2023-09-13 RX ORDER — ESCITALOPRAM OXALATE 10 MG/1
TABLET ORAL
Qty: 30 TABLET | Refills: 5 | Status: SHIPPED | OUTPATIENT
Start: 2023-09-13

## 2023-09-13 RX ORDER — BUDESONIDE AND FORMOTEROL FUMARATE DIHYDRATE 160; 4.5 UG/1; UG/1
AEROSOL RESPIRATORY (INHALATION)
Qty: 10.2 G | Refills: 5 | Status: SHIPPED | OUTPATIENT
Start: 2023-09-13

## 2023-09-19 ENCOUNTER — TELEPHONE (OUTPATIENT)
Facility: CLINIC | Age: 78
End: 2023-09-19

## 2023-09-19 NOTE — TELEPHONE ENCOUNTER
Patient's granddaughter called and stated that her grandmother is still having a paint in her stomach and she would like the same pain pills that the hospital prescribed for her. Oxycodone and the antibiotic they gave her as well to be sent to [facility].

## 2023-09-22 RX ORDER — OMEPRAZOLE 40 MG/1
CAPSULE, DELAYED RELEASE ORAL
Qty: 30 CAPSULE | Refills: 3 | Status: SHIPPED | OUTPATIENT
Start: 2023-09-22

## 2023-10-06 RX ORDER — DULOXETIN HYDROCHLORIDE 30 MG/1
30 CAPSULE, DELAYED RELEASE ORAL 2 TIMES DAILY
Qty: 60 CAPSULE | Refills: 0 | Status: SHIPPED | OUTPATIENT
Start: 2023-10-06 | End: 2023-11-14

## 2023-10-19 ENCOUNTER — OFFICE VISIT (OUTPATIENT)
Facility: CLINIC | Age: 78
End: 2023-10-19
Payer: MEDICAID

## 2023-10-19 VITALS
DIASTOLIC BLOOD PRESSURE: 80 MMHG | RESPIRATION RATE: 18 BRPM | HEIGHT: 59 IN | OXYGEN SATURATION: 99 % | HEART RATE: 80 BPM | WEIGHT: 287.8 LBS | BODY MASS INDEX: 58.02 KG/M2 | TEMPERATURE: 97.9 F | SYSTOLIC BLOOD PRESSURE: 130 MMHG

## 2023-10-19 DIAGNOSIS — E78.00 PURE HYPERCHOLESTEROLEMIA: ICD-10-CM

## 2023-10-19 DIAGNOSIS — Z86.19 HISTORY OF HEPATITIS C: ICD-10-CM

## 2023-10-19 DIAGNOSIS — I25.2 HISTORY OF HEART ATTACK: ICD-10-CM

## 2023-10-19 DIAGNOSIS — K21.9 GASTROESOPHAGEAL REFLUX DISEASE WITHOUT ESOPHAGITIS: ICD-10-CM

## 2023-10-19 DIAGNOSIS — Z79.811 LONG TERM CURRENT USE OF AROMATASE INHIBITOR: ICD-10-CM

## 2023-10-19 DIAGNOSIS — J44.9 CHRONIC OBSTRUCTIVE PULMONARY DISEASE, UNSPECIFIED COPD TYPE (HCC): ICD-10-CM

## 2023-10-19 DIAGNOSIS — F41.9 ANXIETY: ICD-10-CM

## 2023-10-19 DIAGNOSIS — K76.9 LIVER DISEASE, CHRONIC, WITH CIRRHOSIS (HCC): ICD-10-CM

## 2023-10-19 DIAGNOSIS — Z86.59 HISTORY OF BIPOLAR DISORDER: ICD-10-CM

## 2023-10-19 DIAGNOSIS — K74.60 CIRRHOSIS OF LIVER WITHOUT ASCITES, UNSPECIFIED HEPATIC CIRRHOSIS TYPE (HCC): ICD-10-CM

## 2023-10-19 DIAGNOSIS — R51.9 HEADACHE DISORDER: ICD-10-CM

## 2023-10-19 DIAGNOSIS — F32.A DEPRESSIVE DISORDER: ICD-10-CM

## 2023-10-19 DIAGNOSIS — N18.32 STAGE 3B CHRONIC KIDNEY DISEASE (HCC): ICD-10-CM

## 2023-10-19 DIAGNOSIS — G47.30 SLEEP APNEA, UNSPECIFIED TYPE: ICD-10-CM

## 2023-10-19 DIAGNOSIS — M17.0 PRIMARY OSTEOARTHRITIS OF BOTH KNEES: ICD-10-CM

## 2023-10-19 DIAGNOSIS — Z85.3 HISTORY OF BREAST CANCER: ICD-10-CM

## 2023-10-19 DIAGNOSIS — K74.60 LIVER DISEASE, CHRONIC, WITH CIRRHOSIS (HCC): ICD-10-CM

## 2023-10-19 PROCEDURE — 3078F DIAST BP <80 MM HG: CPT | Performed by: INTERNAL MEDICINE

## 2023-10-19 PROCEDURE — 1123F ACP DISCUSS/DSCN MKR DOCD: CPT | Performed by: INTERNAL MEDICINE

## 2023-10-19 PROCEDURE — 3074F SYST BP LT 130 MM HG: CPT | Performed by: INTERNAL MEDICINE

## 2023-10-19 PROCEDURE — 99214 OFFICE O/P EST MOD 30 MIN: CPT | Performed by: INTERNAL MEDICINE

## 2023-10-19 RX ORDER — BISACODYL 5 MG
1 TABLET, DELAYED RELEASE (ENTERIC COATED) ORAL DAILY PRN
COMMUNITY
Start: 2023-09-22

## 2023-10-19 RX ORDER — POLYETHYLENE GLYCOL 3350 17 G/17G
17 POWDER, FOR SOLUTION ORAL DAILY PRN
COMMUNITY
Start: 2023-09-30

## 2023-10-19 SDOH — ECONOMIC STABILITY: FOOD INSECURITY: WITHIN THE PAST 12 MONTHS, THE FOOD YOU BOUGHT JUST DIDN'T LAST AND YOU DIDN'T HAVE MONEY TO GET MORE.: NEVER TRUE

## 2023-10-19 SDOH — ECONOMIC STABILITY: INCOME INSECURITY: HOW HARD IS IT FOR YOU TO PAY FOR THE VERY BASICS LIKE FOOD, HOUSING, MEDICAL CARE, AND HEATING?: NOT HARD AT ALL

## 2023-10-19 SDOH — ECONOMIC STABILITY: FOOD INSECURITY: WITHIN THE PAST 12 MONTHS, YOU WORRIED THAT YOUR FOOD WOULD RUN OUT BEFORE YOU GOT MONEY TO BUY MORE.: NEVER TRUE

## 2023-10-19 ASSESSMENT — ENCOUNTER SYMPTOMS
ALLERGIC/IMMUNOLOGIC NEGATIVE: 1
GASTROINTESTINAL NEGATIVE: 1
RESPIRATORY NEGATIVE: 1
EYES NEGATIVE: 1

## 2023-10-19 NOTE — PROGRESS NOTES
Willam Woods (: 1945) is a 78 y.o. female, Established patient patient, here for evaluation of the following chief complaint(s):  Follow-up Chronic Condition         ASSESSMENT/PLAN:  Below is the assessment and plan developed based on review of pertinent history, physical exam, labs, studies, and medications. 1. Liver disease, chronic, with cirrhosis ([address])  2. BMI 50.0-59.9, adult ([address])  3. Chronic obstructive pulmonary disease, unspecified COPD type ([address])  4. Stage 3b chronic kidney disease ([address])  5. Pure hypercholesterolemia  6. Gastroesophageal reflux disease without esophagitis  7. Depressive disorder  8. Anxiety  9. Sleep apnea, unspecified type  10. Long term current use of aromatase inhibitor  11. Primary osteoarthritis of both knees  12. History of heart attack  13. History of hepatitis C  14. History of breast cancer  15. Headache disorder  16. Cirrhosis of liver without ascites, unspecified hepatic cirrhosis type ([address])  17. History of bipolar disorder    Alcoholic cirrhosis of liver without ascites. She has been abstinent from alcohol. Her liver enzymes are normal but she has been consulted by gastroenterologist at [facility] going to have colonoscopy and EGD and also to have ultrasound of the liver. She came with her daughter. Explained to her.     Anxiety with depression getting combination of duloxetine and escitalopram    She has BMI 58.13 and she says that her gastroenterologist agreed to be on weight loss injection started on GLP-1 receptor agonist Wegovy started with low-dose to see how she can tolerate, only challenges that it is a Bacticlor on the pharmacy and also whether insurance will cover or not otherwise we have to do prior Auth I explained to her daughter and gradually I will increase the dose every 4 weeks if she tolerates without having nausea vomiting or abdominal pain    Hypertension controlled taking amlodipine 5 mg/day,    Hypercholesteremia getting

## 2023-10-31 RX ORDER — DULOXETIN HYDROCHLORIDE 30 MG/1
30 CAPSULE, DELAYED RELEASE ORAL 2 TIMES DAILY
Qty: 60 CAPSULE | Refills: 0 | OUTPATIENT
Start: 2023-10-31

## 2023-11-14 RX ORDER — DULOXETIN HYDROCHLORIDE 30 MG/1
30 CAPSULE, DELAYED RELEASE ORAL 2 TIMES DAILY
Qty: 60 CAPSULE | Refills: 5 | Status: SHIPPED | OUTPATIENT
Start: 2023-11-14

## 2024-01-25 RX ORDER — PRAVASTATIN SODIUM 20 MG
TABLET ORAL
Qty: 30 TABLET | Refills: 0 | Status: SHIPPED | OUTPATIENT
Start: 2024-01-25

## 2024-02-20 ENCOUNTER — OFFICE VISIT (OUTPATIENT)
Facility: CLINIC | Age: 79
End: 2024-02-20
Payer: MEDICAID

## 2024-02-20 VITALS
HEART RATE: 80 BPM | WEIGHT: 293 LBS | HEIGHT: 59 IN | OXYGEN SATURATION: 96 % | BODY MASS INDEX: 59.07 KG/M2 | SYSTOLIC BLOOD PRESSURE: 110 MMHG | DIASTOLIC BLOOD PRESSURE: 78 MMHG | TEMPERATURE: 98 F

## 2024-02-20 DIAGNOSIS — G47.30 SLEEP APNEA, UNSPECIFIED TYPE: ICD-10-CM

## 2024-02-20 DIAGNOSIS — Z86.19 HISTORY OF HEPATITIS C: ICD-10-CM

## 2024-02-20 DIAGNOSIS — F10.11 HISTORY OF ALCOHOL ABUSE: ICD-10-CM

## 2024-02-20 DIAGNOSIS — Z85.3 HISTORY OF BREAST CANCER: ICD-10-CM

## 2024-02-20 DIAGNOSIS — F41.9 ANXIETY: ICD-10-CM

## 2024-02-20 DIAGNOSIS — Z87.891 EX-SMOKER: ICD-10-CM

## 2024-02-20 DIAGNOSIS — K74.60 CIRRHOSIS OF LIVER WITHOUT ASCITES, UNSPECIFIED HEPATIC CIRRHOSIS TYPE (HCC): ICD-10-CM

## 2024-02-20 DIAGNOSIS — J45.20 MILD INTERMITTENT ASTHMA WITHOUT COMPLICATION: ICD-10-CM

## 2024-02-20 DIAGNOSIS — I10 ESSENTIAL HYPERTENSION: ICD-10-CM

## 2024-02-20 DIAGNOSIS — I25.2 HISTORY OF HEART ATTACK: ICD-10-CM

## 2024-02-20 DIAGNOSIS — R51.9 HEADACHE DISORDER: ICD-10-CM

## 2024-02-20 DIAGNOSIS — M17.0 PRIMARY OSTEOARTHRITIS OF BOTH KNEES: ICD-10-CM

## 2024-02-20 DIAGNOSIS — Z86.59 HISTORY OF BIPOLAR DISORDER: ICD-10-CM

## 2024-02-20 DIAGNOSIS — K74.60 LIVER DISEASE, CHRONIC, WITH CIRRHOSIS (HCC): ICD-10-CM

## 2024-02-20 DIAGNOSIS — Z79.811 LONG TERM CURRENT USE OF AROMATASE INHIBITOR: ICD-10-CM

## 2024-02-20 DIAGNOSIS — E78.00 PURE HYPERCHOLESTEROLEMIA: ICD-10-CM

## 2024-02-20 DIAGNOSIS — M54.50 CHRONIC BILATERAL LOW BACK PAIN, UNSPECIFIED WHETHER SCIATICA PRESENT: ICD-10-CM

## 2024-02-20 DIAGNOSIS — K76.9 LIVER DISEASE, CHRONIC, WITH CIRRHOSIS (HCC): ICD-10-CM

## 2024-02-20 DIAGNOSIS — N18.32 STAGE 3B CHRONIC KIDNEY DISEASE (HCC): ICD-10-CM

## 2024-02-20 DIAGNOSIS — K21.9 GASTROESOPHAGEAL REFLUX DISEASE WITHOUT ESOPHAGITIS: ICD-10-CM

## 2024-02-20 DIAGNOSIS — J44.9 CHRONIC OBSTRUCTIVE PULMONARY DISEASE, UNSPECIFIED COPD TYPE (HCC): ICD-10-CM

## 2024-02-20 DIAGNOSIS — F32.A DEPRESSIVE DISORDER: ICD-10-CM

## 2024-02-20 DIAGNOSIS — G89.29 CHRONIC BILATERAL LOW BACK PAIN, UNSPECIFIED WHETHER SCIATICA PRESENT: ICD-10-CM

## 2024-02-20 DIAGNOSIS — M54.16 LUMBAR RADICULOPATHY: ICD-10-CM

## 2024-02-20 PROCEDURE — 1123F ACP DISCUSS/DSCN MKR DOCD: CPT | Performed by: INTERNAL MEDICINE

## 2024-02-20 PROCEDURE — 99214 OFFICE O/P EST MOD 30 MIN: CPT | Performed by: INTERNAL MEDICINE

## 2024-02-20 PROCEDURE — 3074F SYST BP LT 130 MM HG: CPT | Performed by: INTERNAL MEDICINE

## 2024-02-20 PROCEDURE — 3078F DIAST BP <80 MM HG: CPT | Performed by: INTERNAL MEDICINE

## 2024-02-20 RX ORDER — ERGOCALCIFEROL 1.25 MG/1
50000 CAPSULE ORAL WEEKLY
Qty: 12 CAPSULE | Refills: 0 | Status: SHIPPED | OUTPATIENT
Start: 2024-02-20

## 2024-02-20 RX ORDER — ONDANSETRON 4 MG/1
4 TABLET, ORALLY DISINTEGRATING ORAL EVERY 12 HOURS PRN
Qty: 30 TABLET | Refills: 0 | Status: SHIPPED | OUTPATIENT
Start: 2024-02-20

## 2024-02-20 ASSESSMENT — PATIENT HEALTH QUESTIONNAIRE - PHQ9
3. TROUBLE FALLING OR STAYING ASLEEP: 1
SUM OF ALL RESPONSES TO PHQ QUESTIONS 1-9: 8
SUM OF ALL RESPONSES TO PHQ9 QUESTIONS 1 & 2: 2
5. POOR APPETITE OR OVEREATING: 1
7. TROUBLE CONCENTRATING ON THINGS, SUCH AS READING THE NEWSPAPER OR WATCHING TELEVISION: 1
1. LITTLE INTEREST OR PLEASURE IN DOING THINGS: 1
SUM OF ALL RESPONSES TO PHQ QUESTIONS 1-9: 9
10. IF YOU CHECKED OFF ANY PROBLEMS, HOW DIFFICULT HAVE THESE PROBLEMS MADE IT FOR YOU TO DO YOUR WORK, TAKE CARE OF THINGS AT HOME, OR GET ALONG WITH OTHER PEOPLE: 1
9. THOUGHTS THAT YOU WOULD BE BETTER OFF DEAD, OR OF HURTING YOURSELF: 1
SUM OF ALL RESPONSES TO PHQ QUESTIONS 1-9: 9
8. MOVING OR SPEAKING SO SLOWLY THAT OTHER PEOPLE COULD HAVE NOTICED. OR THE OPPOSITE, BEING SO FIGETY OR RESTLESS THAT YOU HAVE BEEN MOVING AROUND A LOT MORE THAN USUAL: 1
4. FEELING TIRED OR HAVING LITTLE ENERGY: 1
SUM OF ALL RESPONSES TO PHQ QUESTIONS 1-9: 9
6. FEELING BAD ABOUT YOURSELF - OR THAT YOU ARE A FAILURE OR HAVE LET YOURSELF OR YOUR FAMILY DOWN: 1
2. FEELING DOWN, DEPRESSED OR HOPELESS: 1

## 2024-02-20 ASSESSMENT — ENCOUNTER SYMPTOMS
BLOOD IN STOOL: 0
ANAL BLEEDING: 0
NAUSEA: 1
CONSTIPATION: 0
DIARRHEA: 0
ALLERGIC/IMMUNOLOGIC NEGATIVE: 1
ABDOMINAL DISTENTION: 0
VOMITING: 0
RECTAL PAIN: 0
EYES NEGATIVE: 1
ABDOMINAL PAIN: 0

## 2024-02-20 ASSESSMENT — COLUMBIA-SUICIDE SEVERITY RATING SCALE - C-SSRS
2. HAVE YOU ACTUALLY HAD ANY THOUGHTS OF KILLING YOURSELF?: NO
6. HAVE YOU EVER DONE ANYTHING, STARTED TO DO ANYTHING, OR PREPARED TO DO ANYTHING TO END YOUR LIFE?: NO
1. WITHIN THE PAST MONTH, HAVE YOU WISHED YOU WERE DEAD OR WISHED YOU COULD GO TO SLEEP AND NOT WAKE UP?: NO

## 2024-02-20 NOTE — PROGRESS NOTES
Chief Complaint   Patient presents with    Follow-up Chronic Condition    Pulse 90   Temp 98 °F (36.7 °C) (Oral)   Ht 1.499 m (4' 11\")   Wt 133.6 kg (294 lb 9.6 oz)   SpO2 96%   BMI 59.50 kg/m²    \"Have you been to the ER, urgent care clinic since your last visit?  Hospitalized since your last visit?\"    NO    “Have you seen or consulted any other health care providers outside of [facility] since your last visit?”    NO         
suffering from joint pain and DJD.    Labs ordered.  Refills given.  Side effects discussed.  She should not take excessive amount of ondansetron other than prescribed it can cause constipation and drowsiness.  Explained to her.  Return in about 3 months (around 5/20/2024) for follow for chronic condition.         SUBJECTIVE/OBJECTIVE:  CHERI    Brina Gonzales having pleasant personality with multiple comorbidities and walking with walker is brought by her daughter.  Her blood pressure is controlled.  She had upper EGD and colonoscopy done at [facility] in December 2023.  She had small mild varices in esophagus.  She has diverticulosis in ascending colon.  Sometimes she feels nausea and she wants to have medicine.  She has history of headache questionable migraine follows neurology is getting topiramate.  She had HCVRNA unremarkable and not detected done by gastroenterologist.  She had a history of hepatitis C in the past.  She has cirrhotic liver on ultrasound of the liver.  She was told by gastroenterologist that everything is stable.  She has history of DJD of joints with back pain and lumbosacral radiculopathy.  She wears CPAP machine.  She has history of anxiety with depression and history of bipolar disorder.  She has quit smoking and drinking alcohol since long.  In the past she had a history of heart problem but no good history available.  She says she wants to lose weight but she could not get V-Go V so today I sent prescription again she is not a good case to be on phentermine having history of myocardial infarction.  No negative thoughts.  No suicidal thoughts.  She is already on duloxetine and escitalopram however her daughter says that sometimes she does not get show she will intermingled but no major depression and no thoughts of suicide or hurting herself or anybody else and she agreed to do counseling.  No Known Allergies  Current Outpatient Medications   Medication Sig Dispense Refill

## 2024-02-22 ENCOUNTER — TELEPHONE (OUTPATIENT)
Facility: CLINIC | Age: 79
End: 2024-02-22

## 2024-02-22 DIAGNOSIS — Z85.3 HISTORY OF BREAST CANCER: ICD-10-CM

## 2024-02-22 DIAGNOSIS — K74.60 LIVER DISEASE, CHRONIC, WITH CIRRHOSIS (HCC): Primary | ICD-10-CM

## 2024-02-22 DIAGNOSIS — K76.9 LIVER DISEASE, CHRONIC, WITH CIRRHOSIS (HCC): Primary | ICD-10-CM

## 2024-02-22 DIAGNOSIS — Z87.19 HISTORY OF ESOPHAGEAL VARICES: ICD-10-CM

## 2024-02-22 RX ORDER — FERROUS SULFATE 325(65) MG
TABLET ORAL
Qty: 30 TABLET | Refills: 5 | OUTPATIENT
Start: 2024-02-22

## 2024-02-22 RX ORDER — PRAVASTATIN SODIUM 20 MG
TABLET ORAL
Qty: 30 TABLET | Refills: 5 | Status: SHIPPED | OUTPATIENT
Start: 2024-02-22

## 2024-02-22 RX ORDER — ESCITALOPRAM OXALATE 10 MG/1
TABLET ORAL
Qty: 30 TABLET | Refills: 5 | Status: SHIPPED | OUTPATIENT
Start: 2024-02-22

## 2024-02-22 RX ORDER — BUDESONIDE AND FORMOTEROL FUMARATE 160; 4.5 UG/1; UG/1
AEROSOL, METERED RESPIRATORY (INHALATION)
Refills: 0 | OUTPATIENT
Start: 2024-02-22

## 2024-02-22 NOTE — TELEPHONE ENCOUNTER
Patient called and stated that she reached out to her Hepatology to see if she can take the weight loss medication and they stated she needs an ultra sound of her liver and abdomen to make sure she has no fluid. Patient stated that the order has to be placed by her PCP. Please advise.

## 2024-02-23 NOTE — TELEPHONE ENCOUNTER
According to pt, liver specialist says the order needs to come from pcp, especially if pcp ids going to write rx for weight loss.

## 2024-02-26 NOTE — TELEPHONE ENCOUNTER
I called and spoke with pt regarding getting notes from Dr. Trimble.  Pt says her 'nurse/casemanager' called [facility] about the weight loss medication and that office told the pt's nurse she needs an ultrasound ordered by PCP to check for fluid around her liver before he could give any recommendations.   Pt says she will get with her nurse to get the office notes and information from Dr. Trimble. Thank you

## 2024-02-26 NOTE — TELEPHONE ENCOUNTER
Called Dr. Trimble office.  says they were not able to get up with the pt. She has never been seen.    I called the pt back telling her she needs to see Dr. Trimble before Dr. Wu can give her any weight loss medications.  Dr. Wu did order a ct of abdomen.   Pt says she will call Dr. Trimble now to schedule the appointment. giuliano

## 2024-02-27 ENCOUNTER — TELEPHONE (OUTPATIENT)
Facility: CLINIC | Age: 79
End: 2024-02-27

## 2024-02-27 DIAGNOSIS — K74.60 LIVER DISEASE, CHRONIC, WITH CIRRHOSIS (HCC): Primary | ICD-10-CM

## 2024-02-27 DIAGNOSIS — Z86.19 HISTORY OF HEPATITIS C: ICD-10-CM

## 2024-02-27 DIAGNOSIS — K76.9 LIVER DISEASE, CHRONIC, WITH CIRRHOSIS (HCC): Primary | ICD-10-CM

## 2024-02-27 DIAGNOSIS — F10.11 HISTORY OF ALCOHOL ABUSE: ICD-10-CM

## 2024-03-08 RX ORDER — BUDESONIDE AND FORMOTEROL FUMARATE DIHYDRATE 160; 4.5 UG/1; UG/1
2 AEROSOL RESPIRATORY (INHALATION) 2 TIMES DAILY
Qty: 1 EACH | Refills: 5 | Status: SHIPPED | OUTPATIENT
Start: 2024-03-08

## 2024-03-08 RX ORDER — FERROUS SULFATE 325(65) MG
TABLET ORAL
Qty: 30 TABLET | Refills: 0 | OUTPATIENT
Start: 2024-03-08

## 2024-03-21 RX ORDER — FERROUS SULFATE 325(65) MG
TABLET ORAL
Qty: 30 TABLET | Refills: 0 | OUTPATIENT
Start: 2024-03-21

## 2024-03-27 ENCOUNTER — TELEPHONE (OUTPATIENT)
Facility: CLINIC | Age: 79
End: 2024-03-27

## 2024-03-27 NOTE — TELEPHONE ENCOUNTER
Returned call from patient. Patient asked if [facility] Ultrasound was in her chart from last week.   Verified that it is in chart.

## 2024-04-03 ENCOUNTER — TELEPHONE (OUTPATIENT)
Facility: CLINIC | Age: 79
End: 2024-04-03

## 2024-04-03 NOTE — TELEPHONE ENCOUNTER
Patient called and state that she got an ultrasound of her liver and it came out that her liver is fine. She stated that she can now get Wagovy but she needs a prior authorization.

## 2024-04-04 RX ORDER — FERROUS SULFATE 325(65) MG
TABLET ORAL
Qty: 30 TABLET | Refills: 0 | OUTPATIENT
Start: 2024-04-04

## 2024-04-18 DIAGNOSIS — N18.32 STAGE 3B CHRONIC KIDNEY DISEASE (HCC): Primary | ICD-10-CM

## 2024-04-19 ENCOUNTER — TELEPHONE (OUTPATIENT)
Facility: CLINIC | Age: 79
End: 2024-04-19

## 2024-04-19 NOTE — TELEPHONE ENCOUNTER
Called patient as well as her 2 sons to advise of her referral to Kidney specialist but had to [facility], asked for them to call the office back. SM LPN

## 2024-04-29 ENCOUNTER — TELEPHONE (OUTPATIENT)
Facility: CLINIC | Age: 79
End: 2024-04-29

## 2024-04-29 NOTE — TELEPHONE ENCOUNTER
Patient's granddaughter called wanting to speak to nurse regarding her grandmother and when she can start taking the Wegovy shot.    Please call.

## 2024-05-01 RX ORDER — ERGOCALCIFEROL 1.25 MG/1
50000 CAPSULE ORAL WEEKLY
Qty: 4 CAPSULE | Refills: 0 | OUTPATIENT
Start: 2024-05-01

## 2024-05-01 RX ORDER — DULOXETIN HYDROCHLORIDE 30 MG/1
30 CAPSULE, DELAYED RELEASE ORAL 2 TIMES DAILY
Qty: 60 CAPSULE | Refills: 5 | Status: SHIPPED | OUTPATIENT
Start: 2024-05-01

## 2024-05-03 ENCOUNTER — TELEPHONE (OUTPATIENT)
Facility: CLINIC | Age: 79
End: 2024-05-03

## 2024-05-03 NOTE — TELEPHONE ENCOUNTER
[facility] reviewed your request for WEGOVY 0.25 MG/0.5 ML PEN for the [employer] member identified above and we denied your request as follows: because we did not see certain details about your use and treatment. We see that this request is for a drug called Wegovy 0.25 milligram per 0.5 milliliter pen for your use (body mass index [BMI] 50.0- 59.9, adult). We may consider approval of this drug in a certain situation (when you have tried and failed one of the non-glucagon-like peptide-1 [GLP-1] weight loss medications 6 months prior to request). We did not see records that show this applies to you. We based this decision on your health plan's prior authorization criteria named Weight Loss Management.       
Tatum from [employer]  called and stated that patient needs a prior authorization for Wegovy   
Contraindicated

## 2024-05-10 RX ORDER — ERGOCALCIFEROL 1.25 MG/1
50000 CAPSULE ORAL WEEKLY
Qty: 4 CAPSULE | Refills: 0 | OUTPATIENT
Start: 2024-05-10

## 2024-05-20 PROBLEM — G89.29 CHRONIC ABDOMINAL PAIN: Status: RESOLVED | Noted: 2023-07-19 | Resolved: 2024-05-20

## 2024-05-20 PROBLEM — R10.9 CHRONIC ABDOMINAL PAIN: Status: RESOLVED | Noted: 2023-07-19 | Resolved: 2024-05-20

## 2024-05-21 RX ORDER — ERGOCALCIFEROL 1.25 MG/1
50000 CAPSULE ORAL WEEKLY
Qty: 4 CAPSULE | Refills: 0 | Status: SHIPPED | OUTPATIENT
Start: 2024-05-21

## 2024-05-21 RX ORDER — LORATADINE 10 MG/1
10 TABLET ORAL DAILY
Qty: 30 TABLET | Refills: 1 | Status: SHIPPED | OUTPATIENT
Start: 2024-05-21

## 2024-05-29 RX ORDER — ERGOCALCIFEROL 1.25 MG/1
50000 CAPSULE ORAL WEEKLY
Qty: 4 CAPSULE | Refills: 0 | OUTPATIENT
Start: 2024-05-29

## 2024-06-17 RX ORDER — PANTOPRAZOLE SODIUM 40 MG/1
TABLET, DELAYED RELEASE ORAL
Qty: 60 TABLET | Refills: 0 | Status: SHIPPED | OUTPATIENT
Start: 2024-06-17

## 2024-06-18 RX ORDER — ERGOCALCIFEROL 1.25 MG/1
50000 CAPSULE ORAL WEEKLY
Qty: 4 CAPSULE | Refills: 0 | OUTPATIENT
Start: 2024-06-18

## 2024-07-08 ENCOUNTER — TELEPHONE (OUTPATIENT)
Facility: CLINIC | Age: 79
End: 2024-07-08

## 2024-07-08 NOTE — TELEPHONE ENCOUNTER
Patient called and stated that she has a discharge and it doesn't smell. She would like something called in to [facility] to clear up the discharge. Please advise.

## 2024-07-15 RX ORDER — ERGOCALCIFEROL 1.25 MG/1
50000 CAPSULE ORAL WEEKLY
Qty: 4 CAPSULE | Refills: 0 | Status: SHIPPED | OUTPATIENT
Start: 2024-07-15

## 2024-07-18 RX ORDER — AMLODIPINE BESYLATE 5 MG/1
5 TABLET ORAL DAILY
Qty: 30 TABLET | Refills: 5 | Status: SHIPPED | OUTPATIENT
Start: 2024-07-18

## 2024-07-23 RX ORDER — ERGOCALCIFEROL 1.25 MG/1
50000 CAPSULE, LIQUID FILLED ORAL WEEKLY
Qty: 4 CAPSULE | Refills: 0 | OUTPATIENT
Start: 2024-07-23

## 2024-08-05 RX ORDER — ERGOCALCIFEROL 1.25 MG/1
50000 CAPSULE ORAL WEEKLY
Qty: 4 CAPSULE | Refills: 0 | Status: SHIPPED | OUTPATIENT
Start: 2024-08-05

## 2024-08-05 RX ORDER — LORATADINE 10 MG/1
10 TABLET ORAL DAILY
Qty: 30 TABLET | Refills: 0 | Status: SHIPPED | OUTPATIENT
Start: 2024-08-05

## 2024-08-16 RX ORDER — ERGOCALCIFEROL 1.25 MG/1
50000 CAPSULE ORAL WEEKLY
Qty: 4 CAPSULE | Refills: 0 | OUTPATIENT
Start: 2024-08-16

## 2024-08-16 RX ORDER — OMEPRAZOLE 40 MG/1
CAPSULE, DELAYED RELEASE ORAL
Qty: 30 CAPSULE | Refills: 5 | Status: SHIPPED | OUTPATIENT
Start: 2024-08-16

## 2024-08-16 RX ORDER — PRAVASTATIN SODIUM 20 MG
TABLET ORAL
Qty: 30 TABLET | Refills: 5 | Status: SHIPPED | OUTPATIENT
Start: 2024-08-16

## 2024-08-16 RX ORDER — BUDESONIDE AND FORMOTEROL FUMARATE DIHYDRATE 160; 4.5 UG/1; UG/1
AEROSOL RESPIRATORY (INHALATION)
Qty: 10.2 G | Refills: 5 | Status: SHIPPED | OUTPATIENT
Start: 2024-08-16

## 2024-08-16 RX ORDER — LORATADINE 10 MG/1
10 TABLET ORAL DAILY
Qty: 30 TABLET | Refills: 5 | Status: SHIPPED | OUTPATIENT
Start: 2024-08-16

## 2024-08-16 RX ORDER — ESCITALOPRAM OXALATE 10 MG/1
TABLET ORAL
Qty: 30 TABLET | Refills: 5 | Status: SHIPPED | OUTPATIENT
Start: 2024-08-16

## 2024-08-20 ENCOUNTER — TELEPHONE (OUTPATIENT)
Facility: CLINIC | Age: 79
End: 2024-08-20

## 2024-08-20 NOTE — TELEPHONE ENCOUNTER
Patient has an appointment on the 28th she has called to advise that she is having vaginal discharge with no odor and also has been having heart burn for about 2 weeks and was wondering what she can do until she comes in? SM LPN

## 2024-08-21 ENCOUNTER — TELEPHONE (OUTPATIENT)
Facility: CLINIC | Age: 79
End: 2024-08-21

## 2024-08-21 NOTE — TELEPHONE ENCOUNTER
Spoke to patient related to vaginal discharge and advised MD indicated she needs to be swabbed and need to go see a GYN MD I gave the patient   number and she is going to call for an appointment she was also advised to take famotidine 40 mg every day OTC. NIXON MONTOYA

## 2024-08-27 ENCOUNTER — OFFICE VISIT (OUTPATIENT)
Facility: CLINIC | Age: 79
End: 2024-08-27
Payer: MEDICAID

## 2024-08-27 VITALS
OXYGEN SATURATION: 98 % | BODY MASS INDEX: 59.07 KG/M2 | SYSTOLIC BLOOD PRESSURE: 110 MMHG | RESPIRATION RATE: 18 BRPM | HEART RATE: 84 BPM | TEMPERATURE: 98.7 F | HEIGHT: 59 IN | DIASTOLIC BLOOD PRESSURE: 60 MMHG | WEIGHT: 293 LBS

## 2024-08-27 DIAGNOSIS — Z86.59 HISTORY OF BIPOLAR DISORDER: ICD-10-CM

## 2024-08-27 DIAGNOSIS — G47.30 SLEEP APNEA, UNSPECIFIED TYPE: ICD-10-CM

## 2024-08-27 DIAGNOSIS — R51.9 HEADACHE DISORDER: ICD-10-CM

## 2024-08-27 DIAGNOSIS — E66.09 CLASS 1 OBESITY DUE TO EXCESS CALORIES WITH SERIOUS COMORBIDITY AND BODY MASS INDEX (BMI) OF 33.0 TO 33.9 IN ADULT: ICD-10-CM

## 2024-08-27 DIAGNOSIS — E78.00 PURE HYPERCHOLESTEROLEMIA: ICD-10-CM

## 2024-08-27 DIAGNOSIS — F41.9 ANXIETY: ICD-10-CM

## 2024-08-27 DIAGNOSIS — J44.9 CHRONIC OBSTRUCTIVE PULMONARY DISEASE, UNSPECIFIED COPD TYPE (HCC): ICD-10-CM

## 2024-08-27 DIAGNOSIS — N18.32 STAGE 3B CHRONIC KIDNEY DISEASE (HCC): ICD-10-CM

## 2024-08-27 DIAGNOSIS — I10 ESSENTIAL HYPERTENSION: Primary | ICD-10-CM

## 2024-08-27 DIAGNOSIS — K74.60 CIRRHOSIS OF LIVER WITHOUT ASCITES, UNSPECIFIED HEPATIC CIRRHOSIS TYPE (HCC): ICD-10-CM

## 2024-08-27 DIAGNOSIS — Z79.811 LONG TERM CURRENT USE OF AROMATASE INHIBITOR: ICD-10-CM

## 2024-08-27 DIAGNOSIS — Z85.3 HISTORY OF BREAST CANCER: ICD-10-CM

## 2024-08-27 DIAGNOSIS — I25.2 HISTORY OF HEART ATTACK: ICD-10-CM

## 2024-08-27 DIAGNOSIS — R73.03 PREDIABETES: ICD-10-CM

## 2024-08-27 DIAGNOSIS — F32.A DEPRESSIVE DISORDER: ICD-10-CM

## 2024-08-27 DIAGNOSIS — K21.9 GASTROESOPHAGEAL REFLUX DISEASE WITHOUT ESOPHAGITIS: ICD-10-CM

## 2024-08-27 DIAGNOSIS — M54.16 LUMBAR RADICULOPATHY: ICD-10-CM

## 2024-08-27 PROBLEM — E78.5 HYPERLIPIDEMIA: Status: ACTIVE | Noted: 2024-07-29

## 2024-08-27 PROCEDURE — 1123F ACP DISCUSS/DSCN MKR DOCD: CPT | Performed by: INTERNAL MEDICINE

## 2024-08-27 PROCEDURE — 99214 OFFICE O/P EST MOD 30 MIN: CPT | Performed by: INTERNAL MEDICINE

## 2024-08-27 PROCEDURE — 3074F SYST BP LT 130 MM HG: CPT | Performed by: INTERNAL MEDICINE

## 2024-08-27 PROCEDURE — 3078F DIAST BP <80 MM HG: CPT | Performed by: INTERNAL MEDICINE

## 2024-08-27 RX ORDER — BUTALBITAL, ACETAMINOPHEN AND CAFFEINE 50; 325; 40 MG/1; MG/1; MG/1
TABLET ORAL
COMMUNITY
End: 2024-08-27 | Stop reason: ALTCHOICE

## 2024-08-27 RX ORDER — MELATONIN 5 MG
TABLET,CHEWABLE ORAL
COMMUNITY

## 2024-08-27 RX ORDER — TIRZEPATIDE 2.5 MG/.5ML
2.5 INJECTION, SOLUTION SUBCUTANEOUS WEEKLY
Qty: 2 ML | Refills: 0 | Status: SHIPPED | OUTPATIENT
Start: 2024-08-27

## 2024-08-27 RX ORDER — OFLOXACIN 3 MG/ML
SOLUTION/ DROPS OPHTHALMIC
COMMUNITY

## 2024-08-27 ASSESSMENT — ENCOUNTER SYMPTOMS
ALLERGIC/IMMUNOLOGIC NEGATIVE: 1
ABDOMINAL PAIN: 0
COUGH: 0
GASTROINTESTINAL NEGATIVE: 1
SHORTNESS OF BREATH: 0
RESPIRATORY NEGATIVE: 1

## 2024-08-27 NOTE — PROGRESS NOTES
Chief Complaint   Patient presents with    Follow-up Chronic Condition     /70 (Site: Right Upper Arm, Position: Sitting)   Pulse 90   Temp 98.7 °F (37.1 °C) (Oral)   Resp 18   Ht 1.499 m (4' 11.02\")   Wt 135.9 kg (299 lb 9.6 oz)   SpO2 98%   BMI 60.48 kg/m²     \"Have you been to the ER, urgent care clinic since your last visit?  Hospitalized since your last visit?\"    NO    “Have you seen or consulted any other health care providers outside of [facility] since your last visit?”    NO            Click Here for Release of Records Request

## 2024-08-27 NOTE — PROGRESS NOTES
Brina Gonzales (: 1945) is a 78 y.o. female, Established patient patient, here for evaluation of the following chief complaint(s):  Follow-up Chronic Condition         ASSESSMENT/PLAN:  Below is the assessment and plan developed based on review of pertinent history, physical exam, labs, studies, and medications.      1. Essential hypertension  2. Cirrhosis of liver without ascites, unspecified hepatic cirrhosis type (HCC)  -     CBC with Auto Differential; Future  -     Comprehensive Metabolic Panel; Future  3. Stage 3b chronic kidney disease (HCC)  -     [facility]  4. Sleep apnea, unspecified type  5. Lumbar radiculopathy  6. Headache disorder  7. Gastroesophageal reflux disease without esophagitis  8. History of breast cancer  9. Long term current use of aromatase inhibitor  10. Depressive disorder  11. Chronic obstructive pulmonary disease, unspecified COPD type (HCC)  12. History of bipolar disorder  13. Anxiety  14. History of heart attack  15. BMI 50.0-59.9, adult ([facility])  16. Pure hypercholesterolemia  17. Class 1 obesity due to excess calories with serious comorbidity and body mass index (BMI) of 33.0 to 33.9 in adult  -     Tirzepatide-Weight Management (ZEPBOUND) 2.5 MG/0.5ML SOAJ; Inject 2.5 mg into the skin once a week, Disp-2 mL, R-0Normal  18. Prediabetes  -     Hemoglobin A1C; Future      Ms. Gonzales came for follow-up for her chronic medical problems.  In between she missed her appointment.      Hypertension, controlled continue amlodipine 5 mg once a day    Vitamin D deficiency continue vitamin D3 once a week    Depression with history of bipolar disorder with chronic musculoskeletal pain and DJD of knee joint getting duloxetine 30 mg twice a day, continue escitalopram 10 mg once a day    BMI 60.48 she could not get Wegovy through her insurance I will try Zepbound she needs to lose weight she cannot do exercise due to knee osteoarthritis walking with  History:   Procedure Laterality Date    BREAST BIOPSY Right     benign    BREAST LUMPECTOMY Right 9/29/2021    RIGHT PARTIAL MASTECTOMY performed by Radha Merida MD at [facility] MAIN OR    COLONOSCOPY  1988    COLONOSCOPY      CYST INCISION AND DRAINAGE  1980    GYN      HYSTERECTOMY, TOTAL ABDOMINAL (CERVIX REMOVED)  1976    IR BIOPSY PERC LIVER OTHER  2014    US BREAST BIOPSY W LOC DEVICE 1ST LESION RIGHT Right 9/2/2021    US BREAST NEEDLE BIOPSY RIGHT 9/2/2021 SSR RAD MAMMO     Family History   Problem Relation Age of Onset    Hypertension Brother     Heart Disease Brother     Hypertension Maternal Grandmother     Hypertension Mother      Social History     Tobacco Use   Smoking Status Former    Current packs/day: 0.00    Average packs/day: 0.5 packs/day for 20.0 years (10.0 ttl pk-yrs)    Types: Cigarettes    Start date: 1/1/1994    Quit date: 1/1/2014    Years since quitting: 10.6   Smokeless Tobacco Never             Review of Systems   Constitutional: Negative.  Negative for fever.   HENT: Negative.     Respiratory: Negative.  Negative for cough and shortness of breath.    Cardiovascular: Negative.  Negative for chest pain and palpitations.   Gastrointestinal: Negative.  Negative for abdominal pain.   Endocrine: Negative.    Genitourinary: Negative.    Musculoskeletal: Negative.         Bilateral knee osteoarthritis.  Using cane.   Skin: Negative.    Allergic/Immunologic: Negative.    Neurological: Negative.  Negative for dizziness, seizures and syncope.   Hematological: Negative.    Psychiatric/Behavioral: Negative.  Negative for agitation and behavioral problems.        Vitals:    08/27/24 1105 08/27/24 1124   BP: 136/70 110/60   Site: Right Upper Arm Right Upper Arm   Position: Sitting Sitting   Cuff Size:  Large Adult   Pulse: 90 84   Resp: 18    Temp: 98.7 °F (37.1 °C)    TempSrc: Oral    SpO2: 98%    Weight: 135.9 kg (299 lb 9.6 oz)    Height: 1.499 m (4' 11.02\")           Physical

## 2024-08-28 ENCOUNTER — TELEPHONE (OUTPATIENT)
Facility: CLINIC | Age: 79
End: 2024-08-28

## 2024-08-28 DIAGNOSIS — N18.32 STAGE 3B CHRONIC KIDNEY DISEASE (HCC): Primary | ICD-10-CM

## 2024-08-28 DIAGNOSIS — D64.9 ANEMIA, UNSPECIFIED TYPE: ICD-10-CM

## 2024-08-28 DIAGNOSIS — Z86.19 HISTORY OF HEPATITIS C: ICD-10-CM

## 2024-08-28 DIAGNOSIS — Z85.3 HISTORY OF BREAST CANCER: ICD-10-CM

## 2024-08-28 DIAGNOSIS — Z79.811 LONG TERM CURRENT USE OF AROMATASE INHIBITOR: ICD-10-CM

## 2024-08-28 LAB
ALBUMIN SERPL-MCNC: 4 G/DL (ref 3.8–4.8)
ALP SERPL-CCNC: 99 IU/L (ref 44–121)
ALT SERPL-CCNC: 10 IU/L (ref 0–32)
AST SERPL-CCNC: 17 IU/L (ref 0–40)
BASOPHILS # BLD AUTO: 0 X10E3/UL (ref 0–0.2)
BASOPHILS NFR BLD AUTO: 1 %
BILIRUB SERPL-MCNC: 0.3 MG/DL (ref 0–1.2)
BUN SERPL-MCNC: 17 MG/DL (ref 8–27)
BUN/CREAT SERPL: 11 (ref 12–28)
CALCIUM SERPL-MCNC: 9.8 MG/DL (ref 8.7–10.3)
CHLORIDE SERPL-SCNC: 105 MMOL/L (ref 96–106)
CO2 SERPL-SCNC: 19 MMOL/L (ref 20–29)
CREAT SERPL-MCNC: 1.59 MG/DL (ref 0.57–1)
EGFRCR SERPLBLD CKD-EPI 2021: 33 ML/MIN/1.73
EOSINOPHIL # BLD AUTO: 0.2 X10E3/UL (ref 0–0.4)
EOSINOPHIL NFR BLD AUTO: 3 %
ERYTHROCYTE [DISTWIDTH] IN BLOOD BY AUTOMATED COUNT: 13.9 % (ref 11.7–15.4)
GLOBULIN SER CALC-MCNC: 3.1 G/DL (ref 1.5–4.5)
GLUCOSE SERPL-MCNC: 82 MG/DL (ref 70–99)
HBA1C MFR BLD: 5.6 % (ref 4.8–5.6)
HCT VFR BLD AUTO: 35.9 % (ref 34–46.6)
HGB BLD-MCNC: 10.6 G/DL (ref 11.1–15.9)
IMM GRANULOCYTES # BLD AUTO: 0 X10E3/UL (ref 0–0.1)
IMM GRANULOCYTES NFR BLD AUTO: 0 %
LYMPHOCYTES # BLD AUTO: 1.1 X10E3/UL (ref 0.7–3.1)
LYMPHOCYTES NFR BLD AUTO: 14 %
MCH RBC QN AUTO: 22.7 PG (ref 26.6–33)
MCHC RBC AUTO-ENTMCNC: 29.5 G/DL (ref 31.5–35.7)
MCV RBC AUTO: 77 FL (ref 79–97)
MONOCYTES # BLD AUTO: 0.5 X10E3/UL (ref 0.1–0.9)
MONOCYTES NFR BLD AUTO: 6 %
NEUTROPHILS # BLD AUTO: 5.9 X10E3/UL (ref 1.4–7)
NEUTROPHILS NFR BLD AUTO: 76 %
PLATELET # BLD AUTO: 279 X10E3/UL (ref 150–450)
POTASSIUM SERPL-SCNC: 4.8 MMOL/L (ref 3.5–5.2)
PROT SERPL-MCNC: 7.1 G/DL (ref 6–8.5)
RBC # BLD AUTO: 4.66 X10E6/UL (ref 3.77–5.28)
SODIUM SERPL-SCNC: 139 MMOL/L (ref 134–144)
WBC # BLD AUTO: 7.9 X10E3/UL (ref 3.4–10.8)

## 2024-08-28 NOTE — TELEPHONE ENCOUNTER
Please inform Ms. Gonzales or her daughter    She is not diabetic with hemoglobin A1c 5.6% but stop sugar and starch and processed food in the diet.    Her kidney function is low but stable it is working 33% she should continue and very important to see kidney specialist that I have referred.    She is anemic hemoglobin is 10.6 I am not sure whether it to due to chronic inflammation and history of breast cancer but hemoglobin is low and white cell count is normal and platelet count is normal she should take iron pill 1 pill once a day and nutritious diet with more vegetables fruits and fibers.

## 2024-08-29 RX ORDER — FERROUS SULFATE 325(65) MG
325 TABLET, DELAYED RELEASE (ENTERIC COATED) ORAL
Qty: 30 TABLET | Refills: 3 | Status: SHIPPED | OUTPATIENT
Start: 2024-08-29

## 2024-09-11 RX ORDER — ERGOCALCIFEROL 1.25 MG/1
50000 CAPSULE, LIQUID FILLED ORAL WEEKLY
Qty: 4 CAPSULE | Refills: 0 | OUTPATIENT
Start: 2024-09-11

## 2024-09-11 RX ORDER — GLUCOSAMINE HCL 500 MG
TABLET ORAL
Qty: 30 TABLET | Refills: 5 | Status: SHIPPED | OUTPATIENT
Start: 2024-09-11

## 2024-09-23 ENCOUNTER — TELEPHONE (OUTPATIENT)
Facility: CLINIC | Age: 79
End: 2024-09-23

## 2024-09-26 ENCOUNTER — HOSPITAL ENCOUNTER (OUTPATIENT)
Facility: HOSPITAL | Age: 79
Discharge: HOME | End: 2024-09-29
Attending: INTERNAL MEDICINE
Payer: MEDICAID

## 2024-09-26 DIAGNOSIS — D05.11 INTRADUCTAL CARCINOMA IN SITU OF RIGHT BREAST: ICD-10-CM

## 2024-09-26 DIAGNOSIS — C50.811 MALIGNANT NEOPLASM OF OVERLAPPING SITES OF RIGHT FEMALE BREAST, UNSPECIFIED ESTROGEN RECEPTOR STATUS (HCC): ICD-10-CM

## 2024-09-26 PROCEDURE — 77080 DXA BONE DENSITY AXIAL: CPT

## 2024-10-07 RX ORDER — TRIAMCINOLONE ACETONIDE 1 MG/G
OINTMENT TOPICAL
Qty: 45 G | Refills: 2 | Status: SHIPPED | OUTPATIENT
Start: 2024-10-07

## 2024-10-10 RX ORDER — DULOXETIN HYDROCHLORIDE 30 MG/1
30 CAPSULE, DELAYED RELEASE ORAL 2 TIMES DAILY
Qty: 60 CAPSULE | Refills: 0 | OUTPATIENT
Start: 2024-10-10

## 2024-10-10 RX ORDER — DICLOFENAC SODIUM 10 MG/G
GEL TOPICAL
Qty: 350 G | Refills: 3 | Status: SHIPPED | OUTPATIENT
Start: 2024-10-10

## 2024-10-28 RX ORDER — DULOXETIN HYDROCHLORIDE 30 MG/1
30 CAPSULE, DELAYED RELEASE ORAL 2 TIMES DAILY
Qty: 60 CAPSULE | Refills: 0 | OUTPATIENT
Start: 2024-10-28

## 2024-11-07 ENCOUNTER — TELEPHONE (OUTPATIENT)
Facility: CLINIC | Age: 79
End: 2024-11-07

## 2024-11-07 DIAGNOSIS — N18.32 STAGE 3B CHRONIC KIDNEY DISEASE (HCC): Primary | ICD-10-CM

## 2024-11-07 RX ORDER — FERROUS SULFATE 325(65) MG
1 TABLET ORAL DAILY
Qty: 30 TABLET | Refills: 5 | Status: SHIPPED | OUTPATIENT
Start: 2024-11-07

## 2024-11-13 ENCOUNTER — HOSPITAL ENCOUNTER (OUTPATIENT)
Facility: HOSPITAL | Age: 79
Discharge: HOME | End: 2024-11-16
Attending: INTERNAL MEDICINE
Payer: MEDICAID

## 2024-11-13 DIAGNOSIS — N18.9 CHRONIC KIDNEY DISEASE, UNSPECIFIED CKD STAGE: ICD-10-CM

## 2024-11-13 PROCEDURE — 76770 US EXAM ABDO BACK WALL COMP: CPT

## 2024-11-26 ENCOUNTER — APPOINTMENT (OUTPATIENT)
Facility: HOSPITAL | Age: 79
End: 2024-11-26
Payer: MEDICAID

## 2024-11-26 ENCOUNTER — HOSPITAL ENCOUNTER (EMERGENCY)
Facility: HOSPITAL | Age: 79
Discharge: HOME | End: 2024-11-26
Payer: MEDICAID

## 2024-11-26 VITALS
HEART RATE: 70 BPM | TEMPERATURE: 98.2 F | WEIGHT: 293 LBS | RESPIRATION RATE: 18 BRPM | BODY MASS INDEX: 59.07 KG/M2 | OXYGEN SATURATION: 98 % | DIASTOLIC BLOOD PRESSURE: 75 MMHG | SYSTOLIC BLOOD PRESSURE: 150 MMHG | HEIGHT: 59 IN
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
## 2024-11-26 DIAGNOSIS — M17.0 PRIMARY OSTEOARTHRITIS OF BOTH KNEES: Primary | ICD-10-CM

## 2024-11-26 PROCEDURE — 73560 X-RAY EXAM OF KNEE 1 OR 2: CPT

## 2024-11-26 PROCEDURE — 99283 EMERGENCY DEPT VISIT LOW MDM: CPT

## 2024-11-26 RX ORDER — ACETAMINOPHEN 500 MG
500 TABLET ORAL 4 TIMES DAILY PRN
Qty: 120 TABLET | Refills: 0 | Status: SHIPPED | OUTPATIENT
Start: 2024-11-26

## 2024-11-26 ASSESSMENT — PAIN DESCRIPTION - PAIN TYPE: TYPE: CHRONIC PAIN

## 2024-11-26 ASSESSMENT — PAIN SCALES - GENERAL: PAINLEVEL_OUTOF10: 10

## 2024-11-26 ASSESSMENT — PAIN - FUNCTIONAL ASSESSMENT: PAIN_FUNCTIONAL_ASSESSMENT: 0-10

## 2024-11-26 NOTE — ED PROVIDER NOTES
[facility] EMERGENCY DEPT  EMERGENCY DEPARTMENT HISTORY AND PHYSICAL EXAM      Date: 11/26/2024  Patient Name: Brina Gonzales  MRN: [id number]  Birthdate 1945  Date of evaluation: 11/26/2024  Provider: ABEBA Lux   Note Started: 4:57 PM EST 11/26/24    HISTORY OF PRESENT ILLNESS     Chief Complaint   Patient presents with    Leg Pain    Knee Pain       History Provided By: Patient    HPI: Brina Gonzales is a 78 y.o. female with past medical history as listed and reviewed below who presents to the ED complaining of bilateral knee pain x 1 week. She states that she fell out of her bed 1 week ago and landed on both knees on the hard floor. She states that since then, she has had persistent bilateral knee pain which is making it difficult to ambulate. She denies hitting her head, any loss of consciousness, pain in her hips or ankles, or pain in her back. She has been icing the knees and taking Tylenol which has provided minimal relief. She states that she has a history of arthritis in both knees and was previously receiving injections in her knees with orthopedics.     PAST MEDICAL HISTORY   Past Medical History:  Past Medical History:   Diagnosis Date    Anemia     Asthma 8/21/2020    Bipolar 1 disorder (HCC) 8/21/2020    Breast CA (HCC) 9/29/2021    Chronic bilateral low back pain, unspecified whether sciatica present     Chronic hepatitis (HCC) 8/21/2020    Chronic kidney disease (CKD), stage II (mild) 8/21/2020    Cirrhosis of liver (HCC) 8/21/2020    COPD (chronic obstructive pulmonary disease) (HCC) 8/21/2020    Depressive disorder 8/21/2020    Edema 8/21/2020    Headache disorder 5/16/2021    History of mood disorder 8/21/2020    Hypertension     Lumbar radiculopathy 8/21/2020    Morbid obesity 8/21/2020    Osteoarthritis 8/21/2020    Pain, knee 8/21/2020    Pure hypercholesterolemia with target low density lipoprotein (LDL) cholesterol less than 130 mg/dL 8/21/2020    RUQ abdominal pain 8/21/2020

## 2024-11-26 NOTE — DISCHARGE INSTRUCTIONS
Thank you for choosing our Emergency Department for your care.  It is our privilege to care for you in your time of need.  In the next several days, you may receive a survey via email or mailed to your home about your experience with our team.  We would greatly appreciate you taking a few minutes to complete the survey, as we use this information to learn what we have done well and what we could be doing better. Thank you for trusting us with your care!    Below you will find a list of your tests from today's visit.   Labs  No results found for this or any previous visit (from the past 12 hour(s)).    Radiologic Studies  XR KNEE LEFT (1-2 VIEWS)   Final Result   Severe tricompartmental degenerative change with bilateral mild genu varus.      Electronically signed by Leo Gonzalez      XR KNEE RIGHT (1-2 VIEWS)   Final Result   Severe tricompartmental degenerative change with bilateral mild genu varus.      Electronically signed by Leo Gonzalez        ------------------------------------------------------------------------------------------------------------  The evaluation and treatment you received in the Emergency Department were for an urgent problem. It is important that you follow-up with a doctor, nurse practitioner, or physician assistant to:  (1) confirm your diagnosis,  (2) re-evaluation of changes in your illness and treatment, and (3) for ongoing care. Please take your discharge instructions with you when you go to your follow-up appointment.     If you have any problem arranging a follow-up appointment, contact us!  If your symptoms become worse or you do not improve as expected, please return to us. We are available 24 hours a day.     If a prescription has been provided, please fill it as soon as possible to prevent a delay in treatment. If you have any questions or reservations about taking the medication due to side effects or interactions with other medications, please call your primary care

## 2024-12-05 ENCOUNTER — TELEPHONE (OUTPATIENT)
Facility: CLINIC | Age: 79
End: 2024-12-05

## 2024-12-05 NOTE — TELEPHONE ENCOUNTER
Patient came in here with her son and he is willing to pay for the Zepbound out of pocket but he needs a paper prescription to do so.    Can you please reorder this medication and print out a paper copy and we will call her so they can come by and pick it up.

## 2024-12-06 NOTE — TELEPHONE ENCOUNTER
Patient came in here with her son and he is willing to pay for the Zepbound out of pocket but he needs a paper prescription to do so.    Can you please reorder this medication and print out a paper copy and we will call her so they can come by and pick it up.        Called and advised son the above was called to pharmacy and if they had any questions please call us. NIXON MONTOYA

## 2024-12-12 ENCOUNTER — OFFICE VISIT (OUTPATIENT)
Facility: CLINIC | Age: 79
End: 2024-12-12
Payer: MEDICAID

## 2024-12-12 VITALS
BODY MASS INDEX: 59.07 KG/M2 | DIASTOLIC BLOOD PRESSURE: 64 MMHG | OXYGEN SATURATION: 99 % | WEIGHT: 293 LBS | TEMPERATURE: 98.3 F | HEIGHT: 59 IN | SYSTOLIC BLOOD PRESSURE: 139 MMHG | HEART RATE: 88 BPM | RESPIRATION RATE: 18 BRPM

## 2024-12-12 DIAGNOSIS — J44.9 CHRONIC OBSTRUCTIVE PULMONARY DISEASE, UNSPECIFIED COPD TYPE (HCC): ICD-10-CM

## 2024-12-12 DIAGNOSIS — K21.9 GASTROESOPHAGEAL REFLUX DISEASE WITHOUT ESOPHAGITIS: Primary | ICD-10-CM

## 2024-12-12 DIAGNOSIS — Z91.81 AT HIGH RISK FOR FALLS: ICD-10-CM

## 2024-12-12 DIAGNOSIS — E66.813 CLASS 3 OBESITY: ICD-10-CM

## 2024-12-12 PROCEDURE — 99204 OFFICE O/P NEW MOD 45 MIN: CPT | Performed by: NURSE PRACTITIONER

## 2024-12-12 PROCEDURE — 3075F SYST BP GE 130 - 139MM HG: CPT | Performed by: NURSE PRACTITIONER

## 2024-12-12 PROCEDURE — 3078F DIAST BP <80 MM HG: CPT | Performed by: NURSE PRACTITIONER

## 2024-12-12 PROCEDURE — 1123F ACP DISCUSS/DSCN MKR DOCD: CPT | Performed by: NURSE PRACTITIONER

## 2024-12-12 RX ORDER — METOPROLOL SUCCINATE 50 MG/1
50 TABLET, EXTENDED RELEASE ORAL DAILY
COMMUNITY

## 2024-12-12 RX ORDER — ALBUTEROL SULFATE 90 UG/1
2 INHALANT RESPIRATORY (INHALATION) 4 TIMES DAILY PRN
Qty: 54 G | Refills: 1 | Status: SHIPPED | OUTPATIENT
Start: 2024-12-12

## 2024-12-12 RX ORDER — OMEPRAZOLE 40 MG/1
40 CAPSULE, DELAYED RELEASE ORAL DAILY
Qty: 30 CAPSULE | Refills: 5 | Status: SHIPPED | OUTPATIENT
Start: 2024-12-12

## 2024-12-12 RX ORDER — CALCIUM CARBONATE 10GR (648 MG) 648 MG/1
1 TABLET ORAL 2 TIMES DAILY
Qty: 60 TABLET | Refills: 5 | Status: SHIPPED | OUTPATIENT
Start: 2024-12-12 | End: 2025-06-10

## 2024-12-12 RX ORDER — CHOLECALCIFEROL (VITAMIN D3) 25 MCG
1000 TABLET ORAL DAILY
COMMUNITY
Start: 2024-11-25

## 2024-12-12 RX ORDER — TIRZEPATIDE 2.5 MG/.5ML
2.5 INJECTION, SOLUTION SUBCUTANEOUS WEEKLY
Qty: 2 ML | Refills: 0 | Status: SHIPPED | OUTPATIENT
Start: 2024-12-12

## 2024-12-12 SDOH — ECONOMIC STABILITY: FOOD INSECURITY: WITHIN THE PAST 12 MONTHS, THE FOOD YOU BOUGHT JUST DIDN'T LAST AND YOU DIDN'T HAVE MONEY TO GET MORE.: NEVER TRUE

## 2024-12-12 SDOH — ECONOMIC STABILITY: INCOME INSECURITY: HOW HARD IS IT FOR YOU TO PAY FOR THE VERY BASICS LIKE FOOD, HOUSING, MEDICAL CARE, AND HEATING?: NOT VERY HARD

## 2024-12-12 SDOH — ECONOMIC STABILITY: FOOD INSECURITY: WITHIN THE PAST 12 MONTHS, YOU WORRIED THAT YOUR FOOD WOULD RUN OUT BEFORE YOU GOT MONEY TO BUY MORE.: NEVER TRUE

## 2024-12-12 ASSESSMENT — ENCOUNTER SYMPTOMS
EYE DISCHARGE: 0
RHINORRHEA: 0
CHEST TIGHTNESS: 0
SHORTNESS OF BREATH: 0
TROUBLE SWALLOWING: 0
COLOR CHANGE: 0
EYE PAIN: 0
VOMITING: 0
SORE THROAT: 0
COUGH: 0
WHEEZING: 0
EYE REDNESS: 0
NAUSEA: 0
ABDOMINAL PAIN: 0

## 2024-12-12 NOTE — PROGRESS NOTES
Chief Complaint   Patient presents with    Follow-up Chronic Condition     Would like  printed prescription for Zepbound, insurance wont cover so will check cost, Dr. Wu prescribed a few months back    Gastroesophageal Reflux             /64 (Site: Right Upper Arm, Position: Sitting)   Pulse 88   Temp 98.3 °F (36.8 °C) (Oral)   Resp 18   Ht 1.499 m (4' 11\")   Wt (!) 138.8 kg (306 lb)   SpO2 99%   BMI 61.80 kg/m²         \"Have you been to the ER, urgent care clinic since your last visit?  Hospitalized since your last visit?\"    YES - When: approximately 3  weeks ago.  Where and Why: fall.    “Have you seen or consulted any other health care providers outside our system since your last visit?”    NO

## 2024-12-12 NOTE — PROGRESS NOTES
Subjective  Chief Complaint   Patient presents with    Follow-up Chronic Condition     Would like  printed prescription for Zepbound, insurance wont cover so will check cost, Dr. Wu prescribed a few months back    Gastroesophageal Reflux     HPI:  Brina Gonzales is a 79 y.o. female presents with a past medical history of essential hypertension, cirrhosis of the liver without ascites, chronic kidney disease stage IIIb, history of breast cancer, chronic obstructive pulmonary disease, intractable migraines, obstructive sleep apnea, osteoarthritis of bilateral knees, lumbar radiculopathy.    Patient presents to establish relationship with primary care treatment team, medication management and reconciliation, physical examination, and health maintenance management. Patient is being followed by [facility] Dr. Abdullahi Martinez MD, in treatment of refractory migraines managed with Imitrex. Patient is also followed by Dr. Shelby Jama MD cardiology secondary to angina symptoms.  Managed with metoprolol with follow up appointment 12/13/2024 with most GERA October 21, 2024 ejection fraction 65%.    Patient presents status post hospital admission on November 26, 2024 secondary to fall with a blood fall reaching for walker.  Patient had subsequent bilateral knee pain from falling on both knees.  X-ray impression below:    XR KNEE LEFT (1-2 VIEWS)  Final Result  Severe tricompartmental degenerative change with bilateral mild genu varus.     XR KNEE RIGHT (1-2 VIEWS)  Final Result  Severe tricompartmental degenerative change with bilateral mild genu varus.    Patient also presents with concerns with trending BMI.  Restarting patient on Zepbound injectable, advised patient to observe healthy diet, low carbs increasing protein.  Will refer patient to bariatric surgery for additional recommendations and treatment options.      Review of Systems   Constitutional:  Positive for fatigue. Negative for chills and

## 2024-12-12 NOTE — ASSESSMENT & PLAN NOTE
Chronic, worsening (exacerbation), continue current plan pending work up below, medication adherence emphasized, and lifestyle modifications recommended    Orders:    omeprazole (PRILOSEC) 40 MG delayed release capsule; Take 1 capsule by mouth daily    calcium carbonate 648 MG TABS; Take 1 tablet by mouth 2 times daily

## 2024-12-12 NOTE — ASSESSMENT & PLAN NOTE
Chronic, not at goal (unstable), continue current treatment plan and medication adherence emphasized    Orders:    albuterol sulfate HFA (VENTOLIN HFA) 108 (90 Base) MCG/ACT inhaler; Inhale 2 puffs into the lungs 4 times daily as needed for Wheezing

## 2024-12-18 ENCOUNTER — TRANSCRIBE ORDERS (OUTPATIENT)
Facility: HOSPITAL | Age: 79
End: 2024-12-18

## 2024-12-18 DIAGNOSIS — D05.11 INTRADUCTAL CARCINOMA IN SITU OF RIGHT BREAST: Primary | ICD-10-CM

## 2024-12-18 DIAGNOSIS — D05.10 DUCTAL CARCINOMA IN SITU (DCIS) OF BREAST, UNSPECIFIED LATERALITY: Primary | ICD-10-CM

## 2025-01-06 RX ORDER — AMLODIPINE BESYLATE 5 MG/1
5 TABLET ORAL DAILY
Qty: 30 TABLET | Refills: 5 | Status: SHIPPED | OUTPATIENT
Start: 2025-01-06

## 2025-01-31 RX ORDER — PRAVASTATIN SODIUM 20 MG
20 TABLET ORAL NIGHTLY
Qty: 30 TABLET | Refills: 5 | Status: SHIPPED | OUTPATIENT
Start: 2025-01-31

## 2025-01-31 RX ORDER — ESCITALOPRAM OXALATE 10 MG/1
10 TABLET ORAL DAILY
Qty: 30 TABLET | Refills: 5 | Status: SHIPPED | OUTPATIENT
Start: 2025-01-31

## 2025-01-31 RX ORDER — LORATADINE 10 MG/1
10 TABLET ORAL DAILY
Qty: 30 TABLET | Refills: 0 | Status: SHIPPED | OUTPATIENT
Start: 2025-01-31

## 2025-02-17 RX ORDER — FERROUS SULFATE 325(65) MG
TABLET ORAL
Qty: 30 TABLET | Refills: 5 | Status: SHIPPED | OUTPATIENT
Start: 2025-02-17

## 2025-02-24 ENCOUNTER — APPOINTMENT (OUTPATIENT)
Facility: HOSPITAL | Age: 80
End: 2025-02-24
Payer: MEDICAID

## 2025-02-24 ENCOUNTER — HOSPITAL ENCOUNTER (EMERGENCY)
Facility: HOSPITAL | Age: 80
Discharge: HOME | End: 2025-02-24
Attending: EMERGENCY MEDICINE
Payer: MEDICAID

## 2025-02-24 VITALS
WEIGHT: 293 LBS | OXYGEN SATURATION: 100 % | TEMPERATURE: 98.5 F | RESPIRATION RATE: 16 BRPM | HEART RATE: 66 BPM | HEIGHT: 59 IN | DIASTOLIC BLOOD PRESSURE: 80 MMHG | SYSTOLIC BLOOD PRESSURE: 161 MMHG | BODY MASS INDEX: 59.07 KG/M2

## 2025-02-24 DIAGNOSIS — R51.9 ACUTE NONINTRACTABLE HEADACHE, UNSPECIFIED HEADACHE TYPE: Primary | ICD-10-CM

## 2025-02-24 DIAGNOSIS — M17.12 ARTHRITIS OF LEFT KNEE: ICD-10-CM

## 2025-02-24 PROCEDURE — 99284 EMERGENCY DEPT VISIT MOD MDM: CPT

## 2025-02-24 PROCEDURE — 96372 THER/PROPH/DIAG INJ SC/IM: CPT

## 2025-02-24 PROCEDURE — 70450 CT HEAD/BRAIN W/O DYE: CPT

## 2025-02-24 PROCEDURE — 73562 X-RAY EXAM OF KNEE 3: CPT

## 2025-02-24 PROCEDURE — 6360000002 HC RX W HCPCS: Performed by: EMERGENCY MEDICINE

## 2025-02-24 RX ORDER — METOCLOPRAMIDE HYDROCHLORIDE 5 MG/ML
10 INJECTION INTRAMUSCULAR; INTRAVENOUS
Status: COMPLETED | OUTPATIENT
Start: 2025-02-24 | End: 2025-02-24

## 2025-02-24 RX ORDER — DIPHENHYDRAMINE HYDROCHLORIDE 50 MG/ML
50 INJECTION INTRAMUSCULAR; INTRAVENOUS
Status: COMPLETED | OUTPATIENT
Start: 2025-02-24 | End: 2025-02-24

## 2025-02-24 RX ADMIN — METOCLOPRAMIDE 10 MG: 5 INJECTION, SOLUTION INTRAMUSCULAR; INTRAVENOUS at 17:31

## 2025-02-24 RX ADMIN — DIPHENHYDRAMINE HYDROCHLORIDE 50 MG: 50 INJECTION INTRAMUSCULAR; INTRAVENOUS at 17:32

## 2025-02-24 ASSESSMENT — PAIN SCALES - GENERAL
PAINLEVEL_OUTOF10: 9
PAINLEVEL_OUTOF10: 4

## 2025-02-24 ASSESSMENT — PAIN - FUNCTIONAL ASSESSMENT: PAIN_FUNCTIONAL_ASSESSMENT: 0-10

## 2025-02-24 ASSESSMENT — PAIN DESCRIPTION - LOCATION: LOCATION: HEAD

## 2025-02-24 ASSESSMENT — LIFESTYLE VARIABLES
HOW MANY STANDARD DRINKS CONTAINING ALCOHOL DO YOU HAVE ON A TYPICAL DAY: PATIENT DOES NOT DRINK
HOW OFTEN DO YOU HAVE A DRINK CONTAINING ALCOHOL: NEVER

## 2025-02-24 NOTE — ED TRIAGE NOTES
Arrives with family, reports having laser eye surgery last year, is concerned she is still having side effects due to procedure, reports photosensitivity and sharp pains throughout head. GCS 15.

## 2025-02-24 NOTE — ED PROVIDER NOTES
Headache feeling better.  Patient comfortable with discharge.  Recommended follow-up with her eye doctor or previous surgeon for continued eye problems [KK]      ED Course User Index  [KK] Zane South MD       SEPSIS Reassessment: Patient does NOT meet Sepsis criteria after ED workup    Clinical Management Tools:  Not Applicable    Patient was given the following medications:  Medications   metoclopramide (REGLAN) injection 10 mg (10 mg IntraMUSCular Given 2/24/25 1731)   diphenhydrAMINE (BENADRYL) injection 50 mg (50 mg IntraMUSCular Given 2/24/25 1732)       CONSULTS: See ED Course/MDM for further details.  None     Social Determinants affecting Diagnosis/Treatment: None    Smoking Cessation: Not Applicable    PROCEDURES   Unless otherwise noted above, none.  Procedures      CRITICAL CARE TIME   Patient does not meet Critical Care Time, 0 minutes    ED IMPRESSION     1. Acute nonintractable headache, unspecified headache type    2. Arthritis of left knee          DISPOSITION/PLAN   DISPOSITION Decision To Discharge 02/24/2025 06:41:06 PM   DISPOSITION CONDITION Stable        Discharge Note: The patient is stable for discharge home. The signs, symptoms, diagnosis, and discharge instructions have been discussed, understanding conveyed, and agreed upon. The patient is to follow up as recommended or return to ER should their symptoms worsen.      PATIENT REFERRED TO:  Jaye Wu MD  [address] [place], [place] [id number] [phone]          follow up with your eye doctor              DISCHARGE MEDICATIONS:     Medication List        ASK your doctor about these medications      acetaminophen 500 MG tablet  Commonly known as: TYLENOL  Take 1 tablet by mouth 4 times daily as needed for Pain     * albuterol (2.5 MG/3ML) 0.083% nebulizer solution  Commonly known as: PROVENTIL     * albuterol sulfate  (90 Base) MCG/ACT inhaler  Commonly known as: Ventolin HFA  Inhale 2 puffs into the

## 2025-02-24 NOTE — DISCHARGE INSTRUCTIONS
Thank you for choosing our Emergency Department for your care.  It is our privilege to care for you in your time of need.  In the next several days, you may receive a survey via email or mailed to your home about your experience with our team.  We would greatly appreciate you taking a few minutes to complete the survey, as we use this information to learn what we have done well and what we could be doing better. Thank you for trusting us with your care!    Below you will find a list of your tests from today's visit.   Labs and Radiology Studies  No results found for this or any previous visit (from the past 12 hour(s)).  CT Head W/O Contrast    Result Date: 2/24/2025  EXAM: CT HEAD WO CONTRAST CLINICAL HISTORY: left sided headaache INDICATION: left sided headaache COMPARISON: None. CT dose reduction was achieved through use of a standardized protocol tailored for this examination and automatic exposure control for dose modulation. TECHNIQUE: Serial axial images with a collimation of 5 mm were obtained from the skull base through the vertex.  Serial axial images with a collimation of 5 mm were obtained from the skull base through the vertex.  Sagittal and coronal reformatted images also obtained.  FINDINGS: The sulci and ventricles are within normal limits for patient age. There is no evidence of an acute infarction, hemorrhage, or mass-effect. There is no evidence of midline shift or hydrocephalus. Posterior fossa structures are unremarkable. No extra-axial collections are seen. Mastoid air cells are well pneumatized and clear.  There is no evidence of depressed skull fractures of soft tissue swelling.     No acute intracranial process. There is no intracranial mass or hemorrhage. Electronically signed by ZEYNEP GIBSON    XR KNEE LEFT (3 VIEWS)    Result Date: 2/24/2025  INDICATION: pain Reason for exam:->pain COMPARISON: November 26, 2024 FINDINGS: 3 views of the left knee demonstrate no acute fracture or

## 2025-02-28 RX ORDER — LORATADINE 10 MG/1
10 TABLET ORAL DAILY
Qty: 30 TABLET | Refills: 9 | Status: SHIPPED | OUTPATIENT
Start: 2025-02-28

## 2025-03-06 ENCOUNTER — TELEPHONE (OUTPATIENT)
Facility: CLINIC | Age: 80
End: 2025-03-06

## 2025-03-06 NOTE — TELEPHONE ENCOUNTER
Patient called and stated that she has a cold and she was running a fever but it has finally broke. She stated that she is congested and still coughing. She would like an antibiotic   She She stated that she has taken over the counter meds but it isn't helping. Please advise     Send to [facility].

## 2025-03-11 ENCOUNTER — TELEPHONE (OUTPATIENT)
Facility: CLINIC | Age: 80
End: 2025-03-11

## 2025-03-11 DIAGNOSIS — E66.813 CLASS 3 OBESITY: ICD-10-CM

## 2025-03-11 DIAGNOSIS — E66.09 CLASS 1 OBESITY DUE TO EXCESS CALORIES WITH SERIOUS COMORBIDITY AND BODY MASS INDEX (BMI) OF 33.0 TO 33.9 IN ADULT: Primary | ICD-10-CM

## 2025-03-11 DIAGNOSIS — E66.811 CLASS 1 OBESITY DUE TO EXCESS CALORIES WITH SERIOUS COMORBIDITY AND BODY MASS INDEX (BMI) OF 33.0 TO 33.9 IN ADULT: Primary | ICD-10-CM

## 2025-03-11 RX ORDER — TIRZEPATIDE 2.5 MG/.5ML
2.5 INJECTION, SOLUTION SUBCUTANEOUS WEEKLY
Qty: 2 ML | Refills: 0 | Status: SHIPPED | OUTPATIENT
Start: 2025-03-11

## 2025-03-11 RX ORDER — TIRZEPATIDE 5 MG/.5ML
5 INJECTION, SOLUTION SUBCUTANEOUS WEEKLY
Qty: 2 ML | Refills: 0 | Status: SHIPPED | OUTPATIENT
Start: 2025-03-11 | End: 2025-03-14 | Stop reason: SDUPTHER

## 2025-03-11 NOTE — TELEPHONE ENCOUNTER
Patient's daughter called stating they need the  prescription for tirzepatide-weight management (ZEPBOUND) 2.5 MG/0.5ML SOAJ subCUTAneous auto-injector pen resent to the pharmacy because they never got it and the are ready for her to start taking it.    Can you please resent this medication to the following pharmacy:    [facility]

## 2025-03-14 RX ORDER — TIRZEPATIDE 5 MG/.5ML
5 INJECTION, SOLUTION SUBCUTANEOUS WEEKLY
Qty: 2 ML | Refills: 0 | Status: SHIPPED | OUTPATIENT
Start: 2025-03-14

## 2025-05-29 ENCOUNTER — HOSPITAL ENCOUNTER (OUTPATIENT)
Facility: HOSPITAL | Age: 80
Discharge: HOME | End: 2025-05-29
Attending: INTERNAL MEDICINE
Payer: MEDICAID

## 2025-05-29 ENCOUNTER — HOSPITAL ENCOUNTER (OUTPATIENT)
Facility: HOSPITAL | Age: 80
End: 2025-05-29
Attending: INTERNAL MEDICINE
Payer: MEDICAID

## 2025-05-29 DIAGNOSIS — D05.10 DUCTAL CARCINOMA IN SITU (DCIS) OF BREAST, UNSPECIFIED LATERALITY: ICD-10-CM

## 2025-05-29 DIAGNOSIS — C50.811 MALIGNANT NEOPLASM OF OVERLAPPING SITES OF RIGHT FEMALE BREAST, UNSPECIFIED ESTROGEN RECEPTOR STATUS (HCC): ICD-10-CM

## 2025-05-29 DIAGNOSIS — D05.11 INTRADUCTAL CARCINOMA IN SITU OF RIGHT BREAST: ICD-10-CM

## 2025-05-29 PROCEDURE — G0279 TOMOSYNTHESIS, MAMMO: HCPCS

## 2025-07-14 RX ORDER — AMLODIPINE BESYLATE 5 MG/1
5 TABLET ORAL DAILY
Qty: 30 TABLET | Refills: 5 | Status: SHIPPED | OUTPATIENT
Start: 2025-07-14

## 2025-07-18 RX ORDER — PRAVASTATIN SODIUM 20 MG
20 TABLET ORAL NIGHTLY
Qty: 30 TABLET | Refills: 5 | Status: SHIPPED | OUTPATIENT
Start: 2025-07-18

## 2025-07-18 RX ORDER — ESCITALOPRAM OXALATE 10 MG/1
10 TABLET ORAL DAILY
Qty: 30 TABLET | Refills: 5 | Status: SHIPPED | OUTPATIENT
Start: 2025-07-18

## 2025-08-13 ENCOUNTER — TELEPHONE (OUTPATIENT)
Facility: CLINIC | Age: 80
End: 2025-08-13

## (undated) DEVICE — SYR LR LCK 1ML GRAD NSAF 30ML --

## (undated) DEVICE — DRAPE,REIN 53X77,STERILE: Brand: MEDLINE

## (undated) DEVICE — TOWEL SURG W17XL27IN STD BLU COT NONFENESTRATED PREWASHED

## (undated) DEVICE — CLIP LIG M BLU TI HRT SHP WIRE HORZ 600 PER BX

## (undated) DEVICE — PREP SKN CHLRAPRP APL 26ML STR --

## (undated) DEVICE — DERMABOND SKIN ADH 0.7ML -- DERMABOND ADVANCED 12/BX

## (undated) DEVICE — GARMENT,MEDLINE,DVT,INT,CALF,MED, GEN2: Brand: MEDLINE

## (undated) DEVICE — GLOVE SURG SZ 65 CRM LTX FREE POLYISOPRENE POLYMER BEAD ANTI

## (undated) DEVICE — SUT MONOCRYL PLUS UD 4-0 --

## (undated) DEVICE — 48" PROBE COVER W/GEL, ULTRASOUND, STERILE: Brand: SITE-RITE

## (undated) DEVICE — INTENDED FOR TISSUE SEPARATION, AND OTHER PROCEDURES THAT REQUIRE A SHARP SURGICAL BLADE TO PUNCTURE OR CUT.: Brand: BARD-PARKER ® CARBON RIB-BACK BLADES

## (undated) DEVICE — SUT SLK 2-0SH 30IN BLK --

## (undated) DEVICE — SOLUTION IRRIG 500ML 0.9% SOD CHL USP POUR PLAS BTL

## (undated) DEVICE — DRAPE,TOP,102X53,STERILE: Brand: MEDLINE

## (undated) DEVICE — Device: Brand: JELCO

## (undated) DEVICE — MAJOR LAP PROCEDURE PACK: Brand: MEDLINE INDUSTRIES, INC.

## (undated) DEVICE — SYRINGE IRRIG 60ML SFT PLIABLE BLB EZ TO GRP 1 HND USE W/

## (undated) DEVICE — YANKAUER,BULB TIP,W/O VENT,RIGID,STERILE: Brand: MEDLINE

## (undated) DEVICE — SUTURE VCRL SZ 3-0 L27IN ABSRB UD L26MM SH 1/2 CIR J416H

## (undated) DEVICE — BASIC SINGLE BASIN-LF: Brand: MEDLINE INDUSTRIES, INC.

## (undated) DEVICE — 3M™ BAIR HUGGER® CHEST ACCESS BLANKET, FULL BODY, 10 PER CASE 30000: Brand: BAIR HUGGER™

## (undated) DEVICE — SOUTHSIDE TURNOVER: Brand: MEDLINE INDUSTRIES, INC.

## (undated) DEVICE — GLOVE SURG SZ 7 L12IN FNGR THK79MIL GRN LTX FREE